# Patient Record
Sex: FEMALE | Race: WHITE | NOT HISPANIC OR LATINO | URBAN - METROPOLITAN AREA
[De-identification: names, ages, dates, MRNs, and addresses within clinical notes are randomized per-mention and may not be internally consistent; named-entity substitution may affect disease eponyms.]

---

## 2020-12-18 ENCOUNTER — INPATIENT (INPATIENT)
Facility: HOSPITAL | Age: 85
LOS: 1 days | Discharge: HOME | DRG: 177 | End: 2020-12-20
Attending: HOSPITALIST | Admitting: STUDENT IN AN ORGANIZED HEALTH CARE EDUCATION/TRAINING PROGRAM
Payer: MEDICARE

## 2020-12-18 VITALS
WEIGHT: 139.99 LBS | HEIGHT: 62 IN | SYSTOLIC BLOOD PRESSURE: 116 MMHG | RESPIRATION RATE: 18 BRPM | HEART RATE: 103 BPM | DIASTOLIC BLOOD PRESSURE: 58 MMHG | OXYGEN SATURATION: 99 % | TEMPERATURE: 100 F

## 2020-12-18 DIAGNOSIS — U07.1 COVID-19: ICD-10-CM

## 2020-12-18 DIAGNOSIS — I10 ESSENTIAL (PRIMARY) HYPERTENSION: ICD-10-CM

## 2020-12-18 DIAGNOSIS — Z29.9 ENCOUNTER FOR PROPHYLACTIC MEASURES, UNSPECIFIED: ICD-10-CM

## 2020-12-18 DIAGNOSIS — E78.5 HYPERLIPIDEMIA, UNSPECIFIED: ICD-10-CM

## 2020-12-18 DIAGNOSIS — F41.9 ANXIETY DISORDER, UNSPECIFIED: ICD-10-CM

## 2020-12-18 LAB
ALBUMIN SERPL ELPH-MCNC: 2.8 G/DL — LOW (ref 3.3–5)
ALP SERPL-CCNC: 73 U/L — SIGNIFICANT CHANGE UP (ref 40–120)
ALT FLD-CCNC: 29 U/L — SIGNIFICANT CHANGE UP (ref 12–78)
ANION GAP SERPL CALC-SCNC: 8 MMOL/L — SIGNIFICANT CHANGE UP (ref 5–17)
APPEARANCE UR: CLEAR — SIGNIFICANT CHANGE UP
AST SERPL-CCNC: 31 U/L — SIGNIFICANT CHANGE UP (ref 15–37)
BASOPHILS # BLD AUTO: 0 K/UL — SIGNIFICANT CHANGE UP (ref 0–0.2)
BASOPHILS NFR BLD AUTO: 0 % — SIGNIFICANT CHANGE UP (ref 0–2)
BILIRUB SERPL-MCNC: 0.5 MG/DL — SIGNIFICANT CHANGE UP (ref 0.2–1.2)
BILIRUB UR-MCNC: NEGATIVE — SIGNIFICANT CHANGE UP
BUN SERPL-MCNC: 16 MG/DL — SIGNIFICANT CHANGE UP (ref 7–23)
CALCIUM SERPL-MCNC: 8.7 MG/DL — SIGNIFICANT CHANGE UP (ref 8.5–10.1)
CHLORIDE SERPL-SCNC: 99 MMOL/L — SIGNIFICANT CHANGE UP (ref 96–108)
CO2 SERPL-SCNC: 24 MMOL/L — SIGNIFICANT CHANGE UP (ref 22–31)
COLOR SPEC: YELLOW — SIGNIFICANT CHANGE UP
CREAT SERPL-MCNC: 0.54 MG/DL — SIGNIFICANT CHANGE UP (ref 0.5–1.3)
D DIMER BLD IA.RAPID-MCNC: 491 NG/ML DDU — HIGH
DIFF PNL FLD: NEGATIVE — SIGNIFICANT CHANGE UP
EOSINOPHIL # BLD AUTO: 0.05 K/UL — SIGNIFICANT CHANGE UP (ref 0–0.5)
EOSINOPHIL NFR BLD AUTO: 1 % — SIGNIFICANT CHANGE UP (ref 0–6)
GLUCOSE SERPL-MCNC: 103 MG/DL — HIGH (ref 70–99)
GLUCOSE UR QL: NEGATIVE — SIGNIFICANT CHANGE UP
HCT VFR BLD CALC: 31 % — LOW (ref 34.5–45)
HGB BLD-MCNC: 10.6 G/DL — LOW (ref 11.5–15.5)
KETONES UR-MCNC: NEGATIVE — SIGNIFICANT CHANGE UP
LACTATE SERPL-SCNC: 0.6 MMOL/L — LOW (ref 0.7–2)
LEUKOCYTE ESTERASE UR-ACNC: NEGATIVE — SIGNIFICANT CHANGE UP
LIDOCAIN IGE QN: 191 U/L — SIGNIFICANT CHANGE UP (ref 73–393)
LYMPHOCYTES # BLD AUTO: 0.93 K/UL — LOW (ref 1–3.3)
LYMPHOCYTES # BLD AUTO: 20 % — SIGNIFICANT CHANGE UP (ref 13–44)
MCHC RBC-ENTMCNC: 32.4 PG — SIGNIFICANT CHANGE UP (ref 27–34)
MCHC RBC-ENTMCNC: 34.2 GM/DL — SIGNIFICANT CHANGE UP (ref 32–36)
MCV RBC AUTO: 94.8 FL — SIGNIFICANT CHANGE UP (ref 80–100)
MONOCYTES # BLD AUTO: 0.33 K/UL — SIGNIFICANT CHANGE UP (ref 0–0.9)
MONOCYTES NFR BLD AUTO: 7 % — SIGNIFICANT CHANGE UP (ref 2–14)
NEUTROPHILS # BLD AUTO: 3.35 K/UL — SIGNIFICANT CHANGE UP (ref 1.8–7.4)
NEUTROPHILS NFR BLD AUTO: 72 % — SIGNIFICANT CHANGE UP (ref 43–77)
NITRITE UR-MCNC: NEGATIVE — SIGNIFICANT CHANGE UP
NRBC # BLD: SIGNIFICANT CHANGE UP /100 WBCS (ref 0–0)
PH UR: 6 — SIGNIFICANT CHANGE UP (ref 5–8)
PLATELET # BLD AUTO: 246 K/UL — SIGNIFICANT CHANGE UP (ref 150–400)
POTASSIUM SERPL-MCNC: 3.8 MMOL/L — SIGNIFICANT CHANGE UP (ref 3.5–5.3)
POTASSIUM SERPL-SCNC: 3.8 MMOL/L — SIGNIFICANT CHANGE UP (ref 3.5–5.3)
PROCALCITONIN SERPL-MCNC: <0.05 — SIGNIFICANT CHANGE UP (ref 0–0.04)
PROT SERPL-MCNC: 6.8 G/DL — SIGNIFICANT CHANGE UP (ref 6–8.3)
PROT UR-MCNC: 15
RBC # BLD: 3.27 M/UL — LOW (ref 3.8–5.2)
RBC # FLD: 12.4 % — SIGNIFICANT CHANGE UP (ref 10.3–14.5)
SARS-COV-2 RNA SPEC QL NAA+PROBE: DETECTED
SODIUM SERPL-SCNC: 131 MMOL/L — LOW (ref 135–145)
SP GR SPEC: 1.01 — SIGNIFICANT CHANGE UP (ref 1.01–1.02)
UROBILINOGEN FLD QL: NEGATIVE — SIGNIFICANT CHANGE UP
WBC # BLD: 4.65 K/UL — SIGNIFICANT CHANGE UP (ref 3.8–10.5)
WBC # FLD AUTO: 4.65 K/UL — SIGNIFICANT CHANGE UP (ref 3.8–10.5)

## 2020-12-18 PROCEDURE — 99285 EMERGENCY DEPT VISIT HI MDM: CPT

## 2020-12-18 PROCEDURE — 71045 X-RAY EXAM CHEST 1 VIEW: CPT | Mod: 26

## 2020-12-18 PROCEDURE — 93010 ELECTROCARDIOGRAM REPORT: CPT

## 2020-12-18 RX ORDER — ONDANSETRON 8 MG/1
1 TABLET, FILM COATED ORAL
Qty: 15 | Refills: 0
Start: 2020-12-18 | End: 2020-12-22

## 2020-12-18 RX ORDER — ONDANSETRON 8 MG/1
1 TABLET, FILM COATED ORAL
Qty: 12 | Refills: 0
Start: 2020-12-18 | End: 2020-12-21

## 2020-12-18 RX ORDER — AMITRIPTYLINE HCL 25 MG
25 TABLET ORAL AT BEDTIME
Refills: 0 | Status: DISCONTINUED | OUTPATIENT
Start: 2020-12-18 | End: 2020-12-20

## 2020-12-18 RX ORDER — ACETAMINOPHEN 500 MG
650 TABLET ORAL EVERY 4 HOURS
Refills: 0 | Status: DISCONTINUED | OUTPATIENT
Start: 2020-12-18 | End: 2020-12-20

## 2020-12-18 RX ORDER — ASPIRIN/CALCIUM CARB/MAGNESIUM 324 MG
1 TABLET ORAL
Qty: 45 | Refills: 0
Start: 2020-12-18 | End: 2021-01-31

## 2020-12-18 RX ORDER — ATORVASTATIN CALCIUM 80 MG/1
40 TABLET, FILM COATED ORAL AT BEDTIME
Refills: 0 | Status: DISCONTINUED | OUTPATIENT
Start: 2020-12-18 | End: 2020-12-20

## 2020-12-18 RX ORDER — ACETAMINOPHEN 500 MG
650 TABLET ORAL ONCE
Refills: 0 | Status: COMPLETED | OUTPATIENT
Start: 2020-12-18 | End: 2020-12-18

## 2020-12-18 RX ORDER — SODIUM CHLORIDE 9 MG/ML
1000 INJECTION INTRAMUSCULAR; INTRAVENOUS; SUBCUTANEOUS ONCE
Refills: 0 | Status: COMPLETED | OUTPATIENT
Start: 2020-12-18 | End: 2020-12-18

## 2020-12-18 RX ORDER — ENOXAPARIN SODIUM 100 MG/ML
40 INJECTION SUBCUTANEOUS DAILY
Refills: 0 | Status: DISCONTINUED | OUTPATIENT
Start: 2020-12-18 | End: 2020-12-20

## 2020-12-18 RX ORDER — METOPROLOL TARTRATE 50 MG
25 TABLET ORAL DAILY
Refills: 0 | Status: DISCONTINUED | OUTPATIENT
Start: 2020-12-18 | End: 2020-12-20

## 2020-12-18 RX ORDER — LISINOPRIL 2.5 MG/1
10 TABLET ORAL DAILY
Refills: 0 | Status: DISCONTINUED | OUTPATIENT
Start: 2020-12-18 | End: 2020-12-18

## 2020-12-18 RX ADMIN — SODIUM CHLORIDE 1000 MILLILITER(S): 9 INJECTION INTRAMUSCULAR; INTRAVENOUS; SUBCUTANEOUS at 14:19

## 2020-12-18 RX ADMIN — ATORVASTATIN CALCIUM 40 MILLIGRAM(S): 80 TABLET, FILM COATED ORAL at 22:43

## 2020-12-18 RX ADMIN — Medication 25 MILLIGRAM(S): at 22:43

## 2020-12-18 RX ADMIN — Medication 650 MILLIGRAM(S): at 14:41

## 2020-12-18 RX ADMIN — Medication 650 MILLIGRAM(S): at 15:41

## 2020-12-18 NOTE — ED PROVIDER NOTE - PATIENT PORTAL LINK FT
You can access the FollowMyHealth Patient Portal offered by [facility] by registering at the following website: [email or web address]. By joining [facility]’s FollowMyHealth portal, you will also be able to view your health information using other applications (apps) compatible with our system.

## 2020-12-18 NOTE — ED PROVIDER NOTE - PHYSICAL EXAMINATION

## 2020-12-18 NOTE — H&P ADULT - PROBLEM SELECTOR PLAN 1
#Acute hypoxic respiratory failure secondary to COVID-19 Pneumonia  - admit to medicine  - day of symptom onset:  - continue with O2 support; may use NC, Venturi Mask, NRB, HFNC  - start dexamethasone in setting of hypoxemia  - will evaluate for role of remdesivir  - avoid aerosolizing procedures i.e. nebulizations when possible  - prone PRN, tolerate lower O2 saturations as possible; avoid intubation with such techniques  - provide prophylactic VTE prophylaxis given high rate of thrombotic events  - trend COVID biomarkers to evaluate for role of full dose AC  - follow up culture data; monitor for fevers, treat PRN  - will monitor off antibiotics unless there is high suspicion for superimposed bacterial infection  - maintain isolation precautions - airborne and contact  - NLR on admission:  - Code Status: Full  - Pulm Consulted  - ID Consulted - CXR: Bilateral patchy opacities  suspicious for multifocal viral/atypical pneumonia  - UA neg  - admit to medicine  - day of symptom onset: 14  - no need to O2 at this time  - dexamethasone not indicated  - out of range (over 14 days) for remdesivir indication  - avoid aerosolizing procedures i.e. nebulizations when possible  - prone PRN, tolerate lower O2 saturations as possible; avoid intubation with such techniques  - provide prophylactic VTE prophylaxis given high rate of thrombotic events  - trend COVID biomarkers to evaluate for role of full dose AC  - follow up Bcx, Ucx, ddimer, ferritin, LDH  - will monitor off antibiotics unless there is high suspicion for superimposed bacterial infection  - maintain isolation precautions - airborne and contact  - NLR on admission: 3.6  - Code Status: Full  - ID (Dr. Mcgill) Consulted

## 2020-12-18 NOTE — PATIENT PROFILE ADULT - TRANSPORTATION

## 2020-12-18 NOTE — ED PROVIDER NOTE - PROGRESS NOTE DETAILS
Pt doing well. vitals WNL. discussed findings with patient daughter and pt. Advised pending covid and likely covid positive. Advised of ED return precautions. COVID positive. pt not providing UA. Denies  complaints. Advised daughter positive covid. Pt ambulated without hypoxia, maintained 99% on room air. Discussed with Dr. crystal recommend aspirin and decadron. pt reports allergy to steroid in which lips swelling up. Rxed aspirin and zofran. ER return precautions discussed. will provide pulse ox. Discussed with Dr. crystal, pt daughter advising pt unable to care for herself at home. Advised to admit.

## 2020-12-18 NOTE — H&P ADULT - NSICDXPASTMEDICALHX_GEN_ALL_CORE_FT
PAST MEDICAL HISTORY:  HLD (hyperlipidemia)     HTN (hypertension)     Macular degeneration     Osteoarthritis      PAST MEDICAL HISTORY:  Anxiety     HLD (hyperlipidemia)     HTN (hypertension)     Macular degeneration     Osteoarthritis

## 2020-12-18 NOTE — H&P ADULT - NSHPSOCIALHISTORY_GEN_ALL_CORE
Tobacco: denies  EtOH: denies  Recreational drug use: denies  Lives with:  Ambulates: independent  ADLs: independent  Occupation:  Vaccinations: Tobacco: denies  EtOH: denies  Recreational drug use: denies  Lives with: Alone  Ambulates: independent  ADLs: independent  Occupation: Retired cafeteria  Vaccinations: Flu

## 2020-12-18 NOTE — H&P ADULT - PROBLEM SELECTOR PLAN 5
DVT PPX with Lovenox 40  IMPROVE VTE Individual Risk Assessment          RISK                                                          Points  [  ] Previous VTE                                                3  [  ] Thrombophilia                                             2  [  ] Lower limb paralysis                                   2        (unable to hold up >15 seconds)    [  ] Current Cancer                                             2         (within 6 months)  [  ] Immobilization > 24 hrs                              1  [  ] ICU/CCU stay > 24 hours                             1  [ x ] Age > 60                                                         1    IMPROVE VTE Score:  1

## 2020-12-18 NOTE — ED PROVIDER NOTE - CLINICAL SUMMARY MEDICAL DECISION MAKING FREE TEXT BOX
BIBA due to nausea and fever x 14 days. Pt reports she has not vomited as she has not eaten anything. pt reports she does not have abdominal pain but "a funky feeling not making me want to eat". Pt reports having fever twice within the last two weeks TMax 100F. (+) cough. Plan includes COVID, labs r/o dehydration IVF, UA r/o UTI, re-assess

## 2020-12-18 NOTE — ED ADULT NURSE NOTE - OBJECTIVE STATEMENT
Received pt in bed alert and oriented.  C/O abdominal pain x 14 days middle region.  Pt also having nausea and fevers.  Pt has been not feeling well for the 14 days and hasn't been eating as much.  Ongoing nursing care and safety maintained.

## 2020-12-18 NOTE — ED PROVIDER NOTE - NSFOLLOWUPINSTRUCTIONS_ED_ALL_ED_FT
You have been tested today for COVID 19 (Coronavirus) as you had a known exposure.  Currently you do not meet criteria for admission to the hospital.  You are being sent home at this time, but you need to put yourself on HOME ISOLATION.  It is currently recommended at this time that you isolate for the next 14 days unless further instructions are provided at a later date.  When home on home isolation please try to use your own bathroom and bedroom, in an effort to prevent the spread to anyone in your household.  Continue Tylenol per label instructions as needed for fever and body aches  Advance activity as tolerated  Please return to the ED for increased difficulty breathing or signs of respiratory distress, unable to eat / drink, dizziness , passing out, chest pains, or any other concerning symptoms.    Your will be contacted with your results at a later time, but currently testing turn around time is around one week.         WHAT YOU NEED TO KNOW:    COVID-19 is the disease caused by the novel (new) coronavirus first discovered in 2019. Coronaviruses generally cause upper respiratory (nose, throat, and lung) infections, such as a cold. The new virus can also cause serious lower respiratory conditions, such as pneumonia or acute respiratory distress syndrome (ARDS). The new virus can also affect many other organs, including the brain and heart. Blood vessels can also be affected, leading to blood clots. Anyone can develop serious problems from the new virus, but your risk is higher if you are 65 or older. A weak immune system, diabetes, or a heart or lung condition can also increase your risk.    DISCHARGE INSTRUCTIONS:    If you think you or someone you know may be infected: Do the following to protect others:   •If emergency care is needed, tell the  about the possible infection, or call ahead and tell the emergency department.      •Call a healthcare provider for instructions if symptoms are mild. Anyone who may be infected should not arrive without calling first. The provider will need to protect staff members and other patients.      •The person who may be infected needs to wear a face covering while getting medical care. This will help lower the risk of infecting others. Coverings are not used for anyone who is younger than 2 years, has breathing problems, or cannot remove it. The provider can give you instructions for anyone who cannot wear a covering.      Call your local emergency number (911 in the [place]) or go to the emergency department if:   •You have trouble breathing or shortness of breath at rest.      •You have chest pain or pressure that lasts longer than 5 minutes.      •You become confused or hard to wake.      •Your lips or face are blue.      •You have a fever of 104°F (40°C) or higher.      Call your doctor if:   •You do not have symptoms of COVID-19 but had close physical contact within 14 days with someone who tested positive.      •You have questions or concerns about your condition or care.      Medicines: You may need any of the following:   •Decongestants help reduce nasal congestion and help you breathe more easily. If you take decongestant pills, they may make you feel restless or cause problems with your sleep. Do not use decongestant sprays for more than a few days.      •Cough suppressants help reduce coughing. Ask your healthcare provider which type of cough medicine is best for you.      •Acetaminophen decreases pain and fever. It is available without a doctor's order. Ask how much to take and how often to take it. Follow directions. Read the labels of all other medicines you are using to see if they also contain acetaminophen, or ask your doctor or pharmacist. Acetaminophen can cause liver damage if not taken correctly. Do not use more than 4 grams (4,000 milligrams) total of acetaminophen in one day.       •NSAIDs, such as ibuprofen, help decrease swelling, pain, and fever. NSAIDs can cause stomach bleeding or kidney problems in certain people. If you take blood thinner medicine, always ask your healthcare provider if NSAIDs are safe for you. Always read the medicine label and follow directions.      •Take your medicine as directed. Contact your healthcare provider if you think your medicine is not helping or if you have side effects. Tell him or her if you are allergic to any medicine. Keep a list of the medicines, vitamins, and herbs you take. Include the amounts, and when and why you take them. Bring the list or the pill bottles to follow-up visits. Carry your medicine list with you in case of an emergency.      How the 2019 coronavirus spreads: The virus spreads quickly and easily. You can become infected if you are in contact with a large amount of the virus, even for a short time. You can also become infected by being around a small amount of virus for a long time. The following are ways the virus is thought to spread, but more information may be coming:   •Droplets are the most common way all coronaviruses spread. The virus can travel in droplets that form when a person talks, coughs, or sneezes. Anyone who breathes in the droplets or gets them in his or her eyes can become infected with the virus. Droplets can stay in the air for a few hours and travel farther than 6 feet (2 meters). A person can have contact with the droplets, even after the infected person leaves the room. This is called airborne transmission, a less common way the virus can spread. It has mainly happened in closed rooms that do not have flowing air.      •Close personal contact with an infected person increases the risk for infection. Close personal contact means you are within 6 feet (2 meters) of the person. The virus can be passed starting 2 days before symptoms begin. The virus can be passed even if the person never develops symptoms, starting 2 days before a positive test. Infection can happen from close contact for a total of 15 minutes or more over 24 hours. An example is close contact 3 times for 5 minutes each within 24 hours. Some factors make infection more likely. These include how close you are and for how long. If you are indoors or outdoors also matters. The risk is even higher if both of you are not wearing face coverings.      •Person-to-person contact can spread the virus. For example, a person with the virus on his or her hands can spread it by shaking hands with someone. At this time, it does not appear that the virus can be passed to a baby during pregnancy or delivery. Some newborns have tested positive for the virus. The newborns may have been infected before, during, or after birth. The greatest risk is for a  to be in close contact with an infected person. The virus also does not appear to spread in breast milk. If you are pregnant or breastfeeding, talk to your healthcare provider or obstetrician about any concerns you have.      •The virus can stay on objects and surfaces. A person can get the virus on his or her hands by touching the object or surface. Infection happens if the person then touches his or her eyes or mouth with unwashed hands. It is not yet known how long the virus can stay on an object or surface. That is why it is important to clean all surfaces that are used regularly.      •An infected animal may be able to infect a person who touches it. This may happen at live markets or on a farm.      How everyone can lower the risk for COVID-19: The best way to prevent infection is to avoid anyone who is infected, but this can be hard to do. An infected person can spread the virus before signs or symptoms begin, or even if signs or symptoms never develop. The following can help lower the risk for infection:     Limit the Spread of Infectious Disease     •Wash your hands often throughout the day. Use soap and water. Rub your soapy hands together, lacing your fingers. Wash the front and back of each hand, and in between your fingers. Use the fingers of one hand to scrub under the fingernails of the other hand. Wash for at least 20 seconds. Rinse with warm, running water for several seconds. Then dry your hands with a clean towel or paper towel. Use hand  that contains alcohol if soap and water are not available. Do not touch your eyes, nose, or mouth without washing your hands first. Teach children how to wash their hands and use hand .  Handwashing           •Cover a sneeze or cough. This prevents droplets from traveling from you to others. Turn your face away and cover your mouth and nose with a tissue. Throw the tissue away. Use the bend of your arm if a tissue is not available. Then wash your hands well with soap and water or use hand . Turn and cover your face if you are around someone who is sneezing or coughing. Teach children how to cover a cough or sneeze.      •Follow worldwide, national, and local social distancing guidelines. Social distancing means people avoid close physical contact so the virus cannot spread from one person to another. Keep at least 6 feet (2 meters) between you and others. Also keep this distance from anyone who comes to your home, such as someone making a delivery.      •Make a habit of not touching your face. It is not known how long the virus can stay on objects and surfaces. If you get the virus on your hands, you can transfer it to your eyes, nose, or mouth and become infected. You can also transfer it to objects, surfaces, or people. Be aware of what you touch when you go out. Examples include handrails and elevator buttons. Try not to touch anything with bare hands unless it is necessary. Wash your hands before you leave your home and when you return.      •Clean and disinfect high-touch surfaces and objects often. Use a disinfecting solution or wipes. You can make a solution by diluting 4 teaspoons of bleach in 1 quart (4 cups) of water. Clean and disinfect even if you think no one living in or coming to your home is infected with the virus. You can wipe items with a disinfecting cloth before you bring them into your home. Wash your hands after you handle anything you bring into your home.      •Make your immune system as healthy as possible. A weakened immune system makes you more vulnerable to the new coronavirus. No COVID-19 vaccine is currently available. Vaccines such as the flu and pneumonia vaccines can help your immune system fight infection. Your doctor can tell you which vaccines to get, and when to get them. Keep your immune system as strong as possible. Do not smoke. Eat healthy foods, exercise regularly, and try to manage stress. Go to bed and wake up at the same times each day.   Healthy Foods           Social distancing guidelines: National and local social distancing rules vary. Rules may change over time as restrictions are lifted. Restrictions may return if an outbreak happens where you live. It is important to know and follow all current social distancing rules in your area. The following are general guidelines:  •Limit trips out of your home. You may be able to have food, medicines, and other supplies delivered. If possible, have delivered items left at your door or other area. Try not to have someone hand you an item. You will be so close to the person that the virus can spread between you.      •Do not have close physical contact with anyone who does not live in your home. Do not shake hands with, hug, or kiss a person as a greeting. Stand or walk as far from others as possible. If you must use public transportation (such as a bus or subway), try to sit or stand away from others. You can stay safely connected with others through phone calls, e-mail messages, social media websites, and video chats. Check in on anyone who may be having a hard time socially distancing, or who lives alone. Ask administrators at nursing homes or long-term care facilities how you can safely communicate with someone living there.      •Wear a cloth face covering around others who do not live in your home. Face coverings help prevent the virus from spreading to others in droplets. You can use a clear face covering if someone needs to read your lips. This is a cloth covering that has plastic over the mouth area so your lips can be seen. Do not use coverings that have breathing valves or vents. The virus can travel out of the valve or vent and be spread to others. Do not take your covering off to talk, cough, or sneeze. Do not use coverings on children younger than 2 years or on anyone who has breathing problems or cannot remove it.      •Only allow medical or other necessary professionals into your home. Wear your face covering, and remind professionals to wear a face covering. Remind them to wash their hands when they arrive and before they leave. Do not let anyone who does not live in your home in, even if the person is not sick. A person can pass the virus to others before symptoms of COVID-19 begin. Some people never even develop symptoms. Children commonly have mild symptoms or no symptoms. It may be hard to tell a child not to hug or kiss you. Explain that this is how he or she can help you stay healthy.      •Do not go to someone else's home unless it is necessary. Do not go over to visit, even if the person is lonely. Only go if you need to help him or her. Make sure you both wear face coverings while you are there.      •Avoid large gatherings and crowds. Gatherings or crowds of 10 or more individuals can cause the virus to spread. Examples of gatherings include parties, sporting events, [employer] services, and conferences. Crowds may form at beaches, spears, and tourist attractions. Protect yourself by staying away from large gatherings and crowds.      •Ask your healthcare provider for other ways to have appointments. You may be able to have appointments without having to go into the provider's office. Some providers offer phone, video, or other types of appointments. You may also be able to get prescriptions for a few months of your medicines at a time.      •Stay safe if you must go out to work. You may have a job that can only be done outside your home. Keep physical distance between you and other workers as much as possible. Follow your employer's rules so everyone stays safe.      If you have COVID-19 and are recovering at home: Healthcare providers will give you specific instructions to follow. The following are general guidelines to remind you how to keep others safe until you are well:   •Wash your hands often. Use soap and water as much as possible. You can use hand  that contains alcohol if soap and water are not available. Do not share towels with anyone. If you use paper towels, throw them away in a lined trash can kept in your room or area. Use a covered trash can, if possible.      •Do not go out of your home unless it is necessary. You may have to go to your healthcare provider's office for check-ups or to get prescription refills. Do not arrive at the provider's office without an appointment. Providers have to make their offices safe for staff and other patients.      •Do not have close physical contact with anyone unless it is necessary. Only have close physical contact with a person giving direct care, or a baby or child you must care for. Family members and friends should not visit you. If possible, stay in a separate area or room of your home if you live with others. No one should go into the area or room except to give you care. You can visit with others by phone, video chat, e-mail, or similar systems. It is important to stay connected with others in your life while you recover.      •Wear a face covering while others are near you. This can help prevent droplets from spreading the virus when you talk, sneeze, or cough. Put the covering on before anyone comes into your room or area. Remind the person to cover his or her nose and mouth before going in to provide care for you.      •Do not share items. Do not share dishes, towels, or other items with anyone. Items need to be washed after you use them.      •Protect your baby. Wash your hands with soap and water often throughout the day. Wear a clean face covering while you breastfeed, or while you express or pump breast milk. If possible, ask someone who is well to care for your baby. You can put breast milk in bottles for the person to use, if needed. Talk to your healthcare provider if you have any questions or concerns about caring for or bonding with your baby. He or she will tell you when to bring your baby in for check-ups and vaccines. He or she will also tell you what to do if you think your baby was infected with the new virus.      •Do not handle live animals. Until more is known, it is best not to touch, play with, or handle live animals. Some animals, including pets, have been infected with the new coronavirus. Do not handle or care for animals until you are well. Care includes feeding, petting, and cuddling your pet. Do not let your pet lick you or share your food. Ask someone who is not infected to take care of your pet, if possible. If you must care for a pet, wear a face covering. Wash your hands before and after you give care.      •Follow directions from your healthcare provider for being around others after you recover. It is not known for sure if or for how long a recovered person can pass the virus to others. Your provider may give you instructions, such as continuing social distancing or wearing a face covering around others. The following are general guidelines for when you can be around others:?If you never developed any symptoms, wait at least 10 days after your positive test. Your provider may want you to have 2 negative tests in a row at least 24 hours apart. This depends on how available testing is in your area.      ?If you did have symptoms, wait at least 10 days after the symptoms first appeared. Then you will need to have no fever for 24 hours without fever medicine. Most of your symptoms will also need to be gone. A loss of taste or smell may continue for several months. It is considered okay to be around others if this is your only symptom.      ?If you were hospitalized for COVID-19 and needed oxygen, your provider will tell you how long to wait. You may need to wait until 20 days after symptoms appeared. It may be less if you have 2 negative tests in a row at least 24 hours apart. This will depend on how available testing is in your area.        How to take care of someone who has COVID-19: If the person lives in another home, arrange for a time to give care. Remember to bring a few pairs of disposable gloves and a cloth face covering. The following are general guidelines to help you safely care for anyone who has COVID-19:  •Wash your hands often. Wash before and after you go into the person's home, area, or room. Throw paper towels away in a lined trash can that has a lid, if possible.      •Do not allow others to go near the person. No one should come into the person's home unless it is necessary. If possible, the person should be in a separate area or room if he or she lives with others. Keep the room's door shut unless you need to go in or out. Have others call, video chat, or e-mail the person if he or she is feeling well enough. The person may feel lonely if he or she is kept separate for a long period of time. Safe communication can help him or her stay connected to family and friends.      •Make sure the person's room has good air flow. You may be able to open the window if the weather allows. An air conditioner can also be turned on to help air move.      •Contact the person before you go in to give care. Make sure the person is wearing a face covering. Remind him or her to wash his or her hands with soap and water. He or she can use hand  that contains alcohol if soap and water are not available. Put on a face covering before you go in to give care.      •Wear gloves while you give care and clean. Clean items the person uses often. Clean countertops, cooking surfaces, and the fronts and insides of the microwave and refrigerator. Clean the shower, toilet, the area around the toilet, the sink, the area around the sink, and faucets. Gather used laundry or bedding. Wash and dry items on the warmest settings the fabric allows. Wash dishes and silverware in hot, soapy water or in a .      •Anything you throw away needs to go into a lined trash can. When you need to empty the trash, close the open end of the lining and tie it closed. This helps prevent items the virus is on from spilling out of the trash. Remove your gloves and throw them away. Wash your hands.      Follow up with your doctor as directed: Write down your questions so you remember to ask them during your visits.    For more information:   •[employer] Prevention  [address] [place] [address]  Phone: 1-346.111.9563  Web Address: http://www.cdc.gov

## 2020-12-18 NOTE — H&P ADULT - NSHPPHYSICALEXAM_GEN_ALL_CORE
GENERAL: patient appears well, no acute distress, appropriate, pleasant  EYES: sclera clear, no exudates  ENMT: oropharynx clear without erythema, no exudates, +dry membranes  NECK: supple, soft, no thyromegaly noted  LUNGS: good air entry bilaterally, clear to auscultation, symmetric breath sounds, no wheezing or rhonchi appreciated  HEART: soft S1/S2, regular rate and rhythm, no murmurs noted, no lower extremity edema  GASTROINTESTINAL: abdomen is soft, nontender, nondistended, normoactive bowel sounds, no palpable masses  INTEGUMENT: skin tenting, no lesions noted  MUSCULOSKELETAL: no clubbing or cyanosis, no obvious deformity  NEUROLOGIC: awake, alert, oriented x3, good muscle tone in 4 extremities, no obvious sensory deficits  PSYCHIATRIC: mood is good, affect is congruent, linear and logical thought process

## 2020-12-18 NOTE — H&P ADULT - ATTENDING COMMENTS
87 year-old female with PMHx of HTN and HLD presenting to emergency department with complaint cough and fever for 14days    T(C): 37.1 (12-18-20 @ 20:15), Max: 37.9 (12-18-20 @ 12:50)  HR: 69 (12-18-20 @ 20:15) (69 - 103)  BP: 120/65 (12-18-20 @ 20:15) (116/58 - 120/65)  RR: 16 (12-18-20 @ 20:15) (16 - 18)  SpO2: 95% (12-18-20 @ 20:15) (95% - 99%)      Covid positive. Follow up inflammatory markers- D dimers, LDH, Ferritin.  Patient does not qualify fro Remdesivir or steroids given 14 dyas psot infection and not de sating.   Will get ID, Pulm consult.   Inhalers, Oxygen if needed. PT eval.

## 2020-12-18 NOTE — H&P ADULT - PROBLEM SELECTOR PLAN 4
DVT PPX with Lovenox 40  IMPROVE VTE Individual Risk Assessment          RISK                                                          Points  [  ] Previous VTE                                                3  [  ] Thrombophilia                                             2  [  ] Lower limb paralysis                                   2        (unable to hold up >15 seconds)    [  ] Current Cancer                                             2         (within 6 months)  [  ] Immobilization > 24 hrs                              1  [  ] ICU/CCU stay > 24 hours                             1  [ x ] Age > 60                                                         1    IMPROVE VTE Score:  1 - Continue home amitriptyline

## 2020-12-18 NOTE — H&P ADULT - ASSESSMENT
Patient is a 87 year-old female with PMHx of HTN and HLD presenting to emergency department with complaint fever and weakness for 14days. Symptoms started on 12/6 with cough. Found to be COVID+ in ED.

## 2020-12-18 NOTE — ED PROVIDER NOTE - OBJECTIVE STATEMENT
86 y/o female with PMHx HLD and HTN BIBA due to nausea and fever x 14 days. Pt reports she has not vomited as she has not eaten anything. pt reports she does not have abdominal pain but "a funky feeling not making me want to eat". Pt reports having fever twice within the last two weeks TMax 100F. pt reports her tongue is dry. pt admits to intermittent cough. pt denies COVID exposure, dysuria, hematuria, vomiting, abd pain, chest pain, SOB, or any other complaints.   PCP - Lorelei

## 2020-12-18 NOTE — ED PROVIDER NOTE - ATTENDING CONTRIBUTION TO CARE
86 yo f who has not been feeling well for past 2 weeks. she has intermittent nausea keeping her from eating or drinking much and today felt very light headed when she tried to ambulate. she has a fever as well  she denies any covid exposures but her nephew is [facility] in this hospital with covid for past few weeks.  pmd dr barrientos  on eval  wd wn female awake alert not distress no pallor  heent mm dry oth normal  cor rrr no devora  chest cta  abd soft nd nt no hsm no rebound guard or cvat  ext normal  neuro normal  skin normal no pallo rno rash  plan: ro covid labs hydrate ro acs or other causes of sx

## 2020-12-18 NOTE — H&P ADULT - HISTORY OF PRESENT ILLNESS
Patient is a 87 year-old female with PMHx of HTN and HLD presenting to emergency department with complaint of nausea and fever for 14days.     reports she has not vomited as she has not eaten anything. pt reports she does not have abdominal pain but "a funky feeling not making me want to eat". Pt reports having fever twice within the last two weeks TMax 100F. pt reports her tongue is dry. pt admits to intermittent cough. pt denies COVID exposure, dysuria, hematuria, vomiting, abd pain, chest pain, SOB, or any other complaints.     The date the pt first felt unwell:   Fever or chills: yes [  ]   no [  ]   - Tmax:   Fatigue, malaise or generalized weakness: yes [  ]   no [  ]  Shortness of breath/dyspnea: yes [  ]   no [  ]  Cough: yes [  ]   no [  ], sputum production: yes [  ]   no [  ]  Blood in sputum: yes [  ]   no [  ]  Anorexia/po intolerance: yes [  ]   no [  ]  Chest pain or chest tightness: yes [  ]   no [  ]  Edema in legs: yes [  ]   no [  ]  Myalgias: yes [  ]   no [  ]  Headache: yes [  ]   no [  ]  Sore throat: yes [  ]   no [  ]  Rhinorrhea: yes [  ]   no [  ]  Abd pain: yes [  ]   no [  ]  Nausea: yes [  ]   no [  ]  Vomiting: yes [  ]   no [  ]  Diarrhea: yes [  ]   no [  ]  Skin rashes: yes [  ]   no [  ]  Loss of sense of smell/anosmia: yes [  ]   no [  ]  Conjunctivitis: yes [  ]   no [  ]  Recent travel: yes [  ]   no [  ] - Location:   Any sick contacts: yes [  ]   no [  ]  Close contact with someone confirmed positive with COVID-19 / SARS-CoV2 in the last 14 days: yes [  ]   no [  ]  Code status:    Other pertinent history:    ED course:  VS: T 100.3, , /58, RR 18, SpO2 99% on RA  Significant Labs: WBC , H/H, BUN/Cr   EKG:  CXR:  Given   Patient is a 87 year-old female with PMHx of HTN and HLD presenting to emergency department with complaint cough and fever for 14days.   Symptoms started on 12/6. Patient called PCP and encouraged to self quarantine for 14days. During this time, her cough improved but she notes decreased appetite, and PO water intake, temperature no higher than 100 at home, feeling weak, unable to sleep, and nausea. Patient denies chest pain, palpitations, SOB, abdominal pain, vomiting, numbness, tingling, or chills.    The date the pt first felt unwell: 12/6  Fever or chills: yes [ x ]   no [  ]   - 100 Tmax:   Fatigue, malaise or generalized weakness: yes [x  ]   no [  ]  Shortness of breath/dyspnea: yes [  ]   no [ x ]  Cough: yes [ x ]   no [  ], sputum production: yes [  ]   no [ x ]  Blood in sputum: yes [  ]   no [ x ]  Anorexia/po intolerance: yes [ x ]   no [  ]  Chest pain or chest tightness: yes [  ]   no [ x ]  Edema in legs: yes [  ]   no [ x ]  Myalgias: yes [  ]   no [ x ]  Headache: yes [  ]   no [ x ]  Sore throat: yes [  ]   no [ x ]  Rhinorrhea: yes [  ]   no [ x ]  Abd pain: yes [  ]   no [ x ]  Nausea: yes [ x ]   no [  ]  Vomiting: yes [  ]   no [ x ]  Diarrhea: yes [  ]   no [ x ]  Skin rashes: yes [  ]   no [ x ]  Loss of sense of smell/anosmia: yes [ x ]   no [  ]  Conjunctivitis: yes [  ]   no [ x ]  Recent travel: yes [  ]   no [ x ] - Location:   Any sick contacts: yes [  ]   no [ x ]  Close contact with someone confirmed positive with COVID-19 / SARS-CoV2 in the last 14 days: yes [  ]   no [ x ]  Code status: Full    ED course:  VS: T 100.3, , /58, RR 18, SpO2 99% on RA  Significant Labs: WBC , H/H, BUN/Cr   EKG: NSR, , QTc 406  CXR: Bilateral patchy opacities  suspicious for multifocal viral/atypical pneumonia  Given: Tylenol x1, 1L NS bolus   Patient is a 87 year-old female with PMHx of HTN and HLD presenting to emergency department with complaint cough and fever for 14days. Symptoms started on 12/6. Patient called PCP and encouraged to self quarantine for 14days. During this time, her cough improved but she notes decreased appetite, and PO water intake, temperature no higher than 100 at home, feeling weak, unable to sleep, and nausea. Patient denies chest pain, palpitations, SOB, abdominal pain, vomiting, numbness, tingling, or chills.    The date the pt first felt unwell: 12/6  Fever or chills: yes [ x ]   no [  ]   - 100 Tmax:   Fatigue, malaise or generalized weakness: yes [x  ]   no [  ]  Shortness of breath/dyspnea: yes [  ]   no [ x ]  Cough: yes [ x ]   no [  ], sputum production: yes [  ]   no [ x ]  Blood in sputum: yes [  ]   no [ x ]  Anorexia/po intolerance: yes [ x ]   no [  ]  Chest pain or chest tightness: yes [  ]   no [ x ]  Edema in legs: yes [  ]   no [ x ]  Myalgias: yes [  ]   no [ x ]  Headache: yes [  ]   no [ x ]  Sore throat: yes [  ]   no [ x ]  Rhinorrhea: yes [  ]   no [ x ]  Abd pain: yes [  ]   no [ x ]  Nausea: yes [ x ]   no [  ]  Vomiting: yes [  ]   no [ x ]  Diarrhea: yes [  ]   no [ x ]  Skin rashes: yes [  ]   no [ x ]  Loss of sense of smell/anosmia: yes [ x ]   no [  ]  Conjunctivitis: yes [  ]   no [ x ]  Recent travel: yes [  ]   no [ x ] - Location:   Any sick contacts: yes [  ]   no [ x ]  Close contact with someone confirmed positive with COVID-19 / SARS-CoV2 in the last 14 days: yes [  ]   no [ x ]  Code status: Full    ED course:  VS: T 100.3, , /58, RR 18, SpO2 99% on RA  Significant Labs: WBC 4.65, H/H 10.6/31, BUN/Cr 16/.56, Lactate .6, trop neg, procal neg  UA: neg  COVID POSITIVE  EKG: NSR, , QTc 406  CXR: Bilateral patchy opacities  suspicious for multifocal viral/atypical pneumonia  Given: Tylenol x1, 1L NS bolus

## 2020-12-18 NOTE — ED PROVIDER NOTE - CARE PROVIDER_API CALL
Deon Marin; PhD)  Infectious Disease; Internal Medicine  [address] [place] [address]  Phone: [phone]  Fax: [phone]  Follow Up Time: 1-3 Days

## 2020-12-19 ENCOUNTER — TRANSCRIPTION ENCOUNTER (OUTPATIENT)
Age: 85
End: 2020-12-19

## 2020-12-19 VITALS
DIASTOLIC BLOOD PRESSURE: 70 MMHG | OXYGEN SATURATION: 94 % | RESPIRATION RATE: 18 BRPM | HEART RATE: 82 BPM | TEMPERATURE: 98 F | SYSTOLIC BLOOD PRESSURE: 116 MMHG

## 2020-12-19 LAB
ALBUMIN SERPL ELPH-MCNC: 2.8 G/DL — LOW (ref 3.3–5)
ALP SERPL-CCNC: 79 U/L — SIGNIFICANT CHANGE UP (ref 40–120)
ALT FLD-CCNC: 38 U/L — SIGNIFICANT CHANGE UP (ref 12–78)
ANION GAP SERPL CALC-SCNC: 9 MMOL/L — SIGNIFICANT CHANGE UP (ref 5–17)
AST SERPL-CCNC: 43 U/L — HIGH (ref 15–37)
BASOPHILS # BLD AUTO: 0.01 K/UL — SIGNIFICANT CHANGE UP (ref 0–0.2)
BASOPHILS NFR BLD AUTO: 0.3 % — SIGNIFICANT CHANGE UP (ref 0–2)
BILIRUB SERPL-MCNC: 0.6 MG/DL — SIGNIFICANT CHANGE UP (ref 0.2–1.2)
BUN SERPL-MCNC: 8 MG/DL — SIGNIFICANT CHANGE UP (ref 7–23)
CALCIUM SERPL-MCNC: 8.6 MG/DL — SIGNIFICANT CHANGE UP (ref 8.5–10.1)
CHLORIDE SERPL-SCNC: 99 MMOL/L — SIGNIFICANT CHANGE UP (ref 96–108)
CO2 SERPL-SCNC: 23 MMOL/L — SIGNIFICANT CHANGE UP (ref 22–31)
CREAT SERPL-MCNC: 0.5 MG/DL — SIGNIFICANT CHANGE UP (ref 0.5–1.3)
CULTURE RESULTS: SIGNIFICANT CHANGE UP
EOSINOPHIL # BLD AUTO: 0.03 K/UL — SIGNIFICANT CHANGE UP (ref 0–0.5)
EOSINOPHIL NFR BLD AUTO: 0.8 % — SIGNIFICANT CHANGE UP (ref 0–6)
FERRITIN SERPL-MCNC: 456 NG/ML — HIGH (ref 15–150)
GLUCOSE SERPL-MCNC: 85 MG/DL — SIGNIFICANT CHANGE UP (ref 70–99)
HCT VFR BLD CALC: 32.2 % — LOW (ref 34.5–45)
HGB BLD-MCNC: 10.6 G/DL — LOW (ref 11.5–15.5)
IMM GRANULOCYTES NFR BLD AUTO: 0.3 % — SIGNIFICANT CHANGE UP (ref 0–1.5)
LDH SERPL L TO P-CCNC: 331 U/L — HIGH (ref 50–242)
LYMPHOCYTES # BLD AUTO: 0.92 K/UL — LOW (ref 1–3.3)
LYMPHOCYTES # BLD AUTO: 23 % — SIGNIFICANT CHANGE UP (ref 13–44)
MCHC RBC-ENTMCNC: 31.4 PG — SIGNIFICANT CHANGE UP (ref 27–34)
MCHC RBC-ENTMCNC: 32.9 GM/DL — SIGNIFICANT CHANGE UP (ref 32–36)
MCV RBC AUTO: 95.3 FL — SIGNIFICANT CHANGE UP (ref 80–100)
MONOCYTES # BLD AUTO: 0.71 K/UL — SIGNIFICANT CHANGE UP (ref 0–0.9)
MONOCYTES NFR BLD AUTO: 17.8 % — HIGH (ref 2–14)
NEUTROPHILS # BLD AUTO: 2.32 K/UL — SIGNIFICANT CHANGE UP (ref 1.8–7.4)
NEUTROPHILS NFR BLD AUTO: 57.8 % — SIGNIFICANT CHANGE UP (ref 43–77)
NRBC # BLD: 0 /100 WBCS — SIGNIFICANT CHANGE UP (ref 0–0)
PLATELET # BLD AUTO: 258 K/UL — SIGNIFICANT CHANGE UP (ref 150–400)
POTASSIUM SERPL-MCNC: 3.3 MMOL/L — LOW (ref 3.5–5.3)
POTASSIUM SERPL-SCNC: 3.3 MMOL/L — LOW (ref 3.5–5.3)
PROT SERPL-MCNC: 6.9 G/DL — SIGNIFICANT CHANGE UP (ref 6–8.3)
RBC # BLD: 3.38 M/UL — LOW (ref 3.8–5.2)
RBC # FLD: 12.3 % — SIGNIFICANT CHANGE UP (ref 10.3–14.5)
SARS-COV-2 IGG SERPL QL IA: POSITIVE
SARS-COV-2 IGM SERPL IA-ACNC: 3.25 INDEX — HIGH
SODIUM SERPL-SCNC: 131 MMOL/L — LOW (ref 135–145)
SPECIMEN SOURCE: SIGNIFICANT CHANGE UP
WBC # BLD: 4 K/UL — SIGNIFICANT CHANGE UP (ref 3.8–10.5)
WBC # FLD AUTO: 4 K/UL — SIGNIFICANT CHANGE UP (ref 3.8–10.5)

## 2020-12-19 PROCEDURE — 99232 SBSQ HOSP IP/OBS MODERATE 35: CPT

## 2020-12-19 PROCEDURE — 99223 1ST HOSP IP/OBS HIGH 75: CPT

## 2020-12-19 RX ORDER — POTASSIUM CHLORIDE 20 MEQ
40 PACKET (EA) ORAL EVERY 4 HOURS
Refills: 0 | Status: COMPLETED | OUTPATIENT
Start: 2020-12-19 | End: 2020-12-19

## 2020-12-19 RX ADMIN — ENOXAPARIN SODIUM 40 MILLIGRAM(S): 100 INJECTION SUBCUTANEOUS at 12:02

## 2020-12-19 RX ADMIN — ATORVASTATIN CALCIUM 40 MILLIGRAM(S): 80 TABLET, FILM COATED ORAL at 21:04

## 2020-12-19 RX ADMIN — Medication 40 MILLIEQUIVALENT(S): at 17:50

## 2020-12-19 RX ADMIN — Medication 25 MILLIGRAM(S): at 21:04

## 2020-12-19 RX ADMIN — Medication 25 MILLIGRAM(S): at 05:59

## 2020-12-19 RX ADMIN — Medication 1 TABLET(S): at 12:01

## 2020-12-19 RX ADMIN — Medication 40 MILLIEQUIVALENT(S): at 13:51

## 2020-12-19 NOTE — DISCHARGE NOTE PROVIDER - NSDCMRMEDTOKEN_GEN_ALL_CORE_FT
Elavil 25 mg oral tablet: 1 tab(s) orally once a day (at bedtime)  lisinopril 10 mg oral tablet: 1 tab(s) orally once a day  metoprolol succinate 25 mg oral capsule, extended release: 1 cap(s) orally once a day  Multiple Vitamins oral tablet: 1 tab(s) orally once a day  Zocor 80 mg oral tablet: 1 tab(s) orally once a day (at bedtime)   acetaminophen 325 mg oral tablet: 2 tab(s) orally every 4 hours, As needed for fever or pain  Elavil 25 mg oral tablet: 1 tab(s) orally once a day (at bedtime)  lisinopril 10 mg oral tablet: 1 tab(s) orally once a day  metoprolol succinate 25 mg oral capsule, extended release: 1 cap(s) orally once a day  Multiple Vitamins oral tablet: 1 tab(s) orally once a day  Zocor 80 mg oral tablet: 1 tab(s) orally once a day (at bedtime)   acetaminophen 325 mg oral tablet: 2 tab(s) orally every 4 hours, As needed for fever or pain  aspirin 81 mg oral tablet: 1 tab(s) orally once a day  Elavil 25 mg oral tablet: 1 tab(s) orally once a day (at bedtime)  lisinopril 10 mg oral tablet: 1 tab(s) orally once a day  metoprolol succinate 25 mg oral capsule, extended release: 1 cap(s) orally once a day  Multiple Vitamins oral tablet: 1 tab(s) orally once a day  Zocor 80 mg oral tablet: 1 tab(s) orally once a day (at bedtime)

## 2020-12-19 NOTE — PHYSICAL THERAPY INITIAL EVALUATION ADULT - DISCHARGE DISPOSITION, PT EVAL
Pt is functionally independent and requires no skilled physical therapy needs. Will discharge from physical therapy secondary to patient independent/home/no skilled PT needs

## 2020-12-19 NOTE — DISCHARGE NOTE PROVIDER - EXTENDED VTE OTHER REASON FREE TEXT
Not hypoxic.  Ambulatory.  Risk of bleeding outweigh benefit at this time.  Not hypoxic.  Ambulatory.  Risk of bleeding outweigh benefit at this time. Recommend ASA 81mg PO daily

## 2020-12-19 NOTE — CONSULT NOTE ADULT - SUBJECTIVE AND OBJECTIVE BOX
Request for consultation received  Chart/Labs/Imaging reviewed  Assessment to follow.  Anthony Velasquez MD  [phone]  ------------------------------  Full note to follow  Admitted with FTT N/V and decreased PO intake  Ill since at least 12/6  No role for RDV  On RA, comfortable, minimally symptomatic at this point.  CXR to my eye does not have infiltrates >50%. No concern of severe disease at present.  Precautions per protocol  DVT PPx per protocol  Thank you for the courtesy of this referral.  Anthony Velasquez MD  Attending Physician  [facility]  Division of Infectious Diseases  [phone]  ---------------------------  [facility]  Division of Infectious Diseases  [phone]    ANITRA RINCON  87y, Female  [id number]    HPI--      PMH/PSH--  Anxiety    Osteoarthritis    HLD (hyperlipidemia)    HTN (hypertension)    Macular degeneration    No significant past surgical history        Allergies--  Balsam of [place] (Urticaria)  COBALT (Urticaria)  Proplene Glycol (Urticaria)  steroids - numbness to face (Unknown)      Medications--  Antibiotics:   Immunologic:   Other: acetaminophen   Tablet .. PRN  amitriptyline  atorvastatin  enoxaparin Injectable  metoprolol succinate ER  multivitamin  potassium chloride    Tablet ER    Antimicrobials last 90 days per EMR: MEDICATIONS  (STANDING):        Social History--  EtOH: denies   Tobacco: denies   Drug Use: denies     Family/Marital History--  Family history of diabetes mellitus (DM)    FH: CAD (coronary artery disease)          Travel/Environmental/Occupational History:      Review of Systems:  A >=10-point review of systems was obtained.     Pertinent positives and negatives--  Constitutional: No fevers. No Chills. No Rigors.   Eyes:  ENMT:  Cardiovascular: No chest pain. No palpitations.  Respiratory: No shortness of breath. No cough.  Gastrointestinal: No nausea or vomiting. No diarrhea or constipation.   Genitourinary:  Musculoskeletal:  Skin:  Neurologic:  Psychiatric: Pleasant. Appropriate affect.  Endocrine:  Heme/Lymphatic:  Allergy/Immunologic:    Review of systems otherwise negative except as previously noted.    Physical Exam--  Vital Signs: T(F): 98.1 (12-19-20 @ 05:26), Max: 100.3 (12-18-20 @ 12:50)  HR: 73 (12-19-20 @ 05:26)  BP: 115/57 (12-19-20 @ 05:26)  RR: 17 (12-19-20 @ 05:26)  SpO2: 93% (12-19-20 @ 05:26)  Wt(kg): --  General: Nontoxic-appearing Female in no acute distress.  HEENT: AT/NC. PERRL. EOMI. Anicteric. Conjunctiva pink and moist. Oropharynx clear. Dentition fair.  Neck: Not rigid. No sense of mass.  Nodes: None palpable.  Lungs: Clear bilaterally without rales, wheezing or rhonchi  Heart: Regular rate and rhythm. No Murmur. No rub. No gallop. No palpable thrill.  Abdomen: Bowel sounds present and normoactive. Soft. Nondistended. Nontender. No sense of mass. No organomegaly.  Back: No spinal tenderness. No costovertebral angle tenderness.   Extremities: No cyanosis or clubbing. No edema.   Skin: Warm. Dry. Good turgor. No rash. No vasculitic stigmata.  Psychiatric: Appropriate affect and mood for situation.         Laboratory & Imaging Data--  CBC                        10.6   4.00  )-----------( 258      ( 19 Dec 2020 10:35 )             32.2       Chemistries  12-19    131<L>  |  99  |  8   ----------------------------<  85  3.3<L>   |  23  |  0.50    Ca    8.6      19 Dec 2020 10:35    TPro  6.9  /  Alb  2.8<L>  /  TBili  0.6  /  DBili  x   /  AST  43<H>  /  ALT  38  /  AlkPhos  79  12-19      Culture Data       Full note to follow  Admitted with FTT N/V and decreased PO intake  Ill since at least 12/6  No role for RDV  On RA, comfortable, minimally symptomatic at this point.  CXR to my eye does not have infiltrates >50%. No concern of severe disease at present.  Precautions per protocol  DVT PPx per protocol  Thank you for the courtesy of this referral.  Anthony Velasquez MD  Attending Physician  [facility]  Division of Infectious Diseases  [phone]  ---------------------------  [facility]  Division of Infectious Diseases  [phone]    ANITRA RINCON  87y, Female  [id number]    HPI--  87F, not a great historian presented to ER because not feeling well >= 2 weeks. During that time has had fever and cough, concerned about COVID. Patient called her doctor who told her to self quarantine. Patient denies prior testing. however in the 4-5 days she noted severe anorexia ("I could not even look at food") and also nausea/vomiting. The latter symptoms, her family being worried about her,  the lack of improvement in other symptoms, prompted her to call her doctor again. He suggested ER evaluation.     Denies any SOB or CP. Denies diarrhea. Denies anosmia. No chills or rigors. Feeling somewhat better now.     PMH/PSH--  Anxiety    Osteoarthritis    HLD (hyperlipidemia)    HTN (hypertension)    Macular degeneration    No significant past surgical history        Allergies--  Balsam of [place] (Urticaria)  COBALT (Urticaria)  Proplene Glycol (Urticaria)  steroids - numbness to face (Unknown)      Medications--  Antibiotics:   Immunologic:   Other: acetaminophen   Tablet .. PRN  amitriptyline  atorvastatin  enoxaparin Injectable  metoprolol succinate ER  multivitamin  potassium chloride    Tablet ER    Antimicrobials last 90 days per EMR: MEDICATIONS  (STANDING):        Social History--  EtOH: denies   Tobacco: denies   Drug Use: denies     Family/Marital History--  Family history of diabetes mellitus (DM)    FH: CAD (coronary artery disease)          Travel/Environmental/Occupational History:      Review of Systems:  A >=10-point review of systems was obtained.   Review of systems otherwise negative except as previously noted.    Physical Exam--  Vital Signs: T(F): 98.1 (12-19-20 @ 05:26), Max: 100.3 (12-18-20 @ 12:50)  HR: 73 (12-19-20 @ 05:26)  BP: 115/57 (12-19-20 @ 05:26)  RR: 17 (12-19-20 @ 05:26)  SpO2: 93% (12-19-20 @ 05:26)  Wt(kg): --  General: Nontoxic-appearing Female in no acute distress.  HEENT: AT/NC. PERRL. EOMI. Anicteric. Conjunctiva pink and moist. Oropharynx clear.  Neck: Not rigid. No sense of mass.  Nodes: None palpable.  Lungs: Diminished breath sounds bilaterally without rales, wheezing or rhonchi  Heart: Regular rate and rhythm. No Murmur. No rub. No gallop. No palpable thrill.  Abdomen: Bowel sounds present and normoactive. Soft. Nondistended. Nontender. No sense of mass. No organomegaly.  Back: No spinal tenderness. No costovertebral angle tenderness.   Extremities: No cyanosis or clubbing. No edema.   Skin: Warm. Dry. Good turgor. No rash. No vasculitic stigmata.  Psychiatric: Appropriate affect and mood for situation.       Lactate Dehydrogenase, Serum (12.19.20 @ 05:08)    Lactate Dehydrogenase, Serum: 331 U/L      Laboratory & Imaging Data--  CBC                        10.6   4.00  )-----------( 258      ( 19 Dec 2020 10:35 )             32.2       Chemistries  12-19    131<L>  |  99  |  8   ----------------------------<  85  3.3<L>   |  23  |  0.50    Ca    8.6      19 Dec 2020 10:35    TPro  6.9  /  Alb  2.8<L>  /  TBili  0.6  /  DBili  x   /  AST  43<H>  /  ALT  38  /  AlkPhos  79  12-19    COVID-19 PCR: Detected: You can help in the fight against COVID-19. [facility] may contact  you to see if you are interested in voluntarily participating in one of  our clinical trials.  Testing is performed using polymerase chain reaction (PCR) or  transcription mediated amplification (TMA). This COVID-19 (SARS-CoV-2)  nucleic acid amplification test was validated by [facility] and is  in use under the FDA Emergency Use Authorization (EUA) for clinical labs  CLIA-certified to perform high complexity testing. Test results should be  correlated with clinical presentation, patient history, and epidemiology. (12.18.20 @ 14:17)    Lactate Dehydrogenase, Serum: 331 U/L (12.19.20 @ 05:08)  D-Dimer Assay, Quantitative: 491 ng/mL DDU (12.18.20 @ 23:09)  Ferritin, Serum: 456 ng/mL (12.19.20 @ 05:08)  No CRP  Auto Neutrophil #: 3.35 K/uL (12.18.20 @ 14:17)  Auto Lymphocyte #: 0.93 K/uL (12.18.20 @ 14:17)      Culture Data  None    < from: Xray Chest 1 View- PORTABLE-Urgent (12.18.20 @ 14:29) >  INTERPRETATION:  Short of breath cough fever.    AP chest. Prior to 20/5/2013.    Low lung volumes. No change heart mediastinum. Scattered bilateral patchy parenchymal opacities greatest in the left perihilar region suspicious for multifocal viral/atypical pneumonia. Recommend close follow-up.    Impression: Bilateral patchy opacities  suspicious for multifocal viral/atypical pneumonia. Recommend close follow-up    < end of copied text >

## 2020-12-19 NOTE — CONSULT NOTE ADULT - ASSESSMENT
87F Admitted with FTT N/V and decreased PO intake, now disgnosed +COVID but Ill since at least 12/6.  No role for RDV given duration of illness.  Does not appear to meet criteria for severe disease at present. On RA, comfortable with sat 94% at time of my assessment, minimally symptomatic at this point. CXR to my eye does not have infiltrates >50%. No concern of severe disease at present. Does not merit decadron.  Does not meet criteria for monoclonal antibody.  Inflammatory markers available do not suggest high likelihood of progression.    Suggestions--  Precautions per protocol, ideally airborne and contact in negative pressure room  Supplemental oxygen as required to maintain adequate saturation.  Avoid NIPPV, high flow O2, nebulilzers, or other interventions which may increase the likelihood of aerosolization as much as feasible  High threshold for antibiotic use  Vigilance for secondary infection and other superimposed events  Monitor inflammatory markers and lab data  DVT prophylaxis per protocol    Thank you for the courtesy of this referral.  Anthony Velasquez MD  Attending Physician  [facility]  Division of Infectious Diseases  [phone]

## 2020-12-19 NOTE — PHYSICAL THERAPY INITIAL EVALUATION ADULT - ASR WT BEARING STATUS EVAL
----- Message from Josef Falcon sent at 3/3/2017 12:44 PM CST -----  Contact: pt   ..Patient calling to speak with coordinator about RX, please call    no weight-bearing restrictions

## 2020-12-19 NOTE — DISCHARGE NOTE PROVIDER - CARE PROVIDER_API CALL
Igor Moseley   INTERNAL MEDICINE  [address] [place] [id number]  Phone: [phone]  Fax: [phone]  Follow Up Time:

## 2020-12-19 NOTE — DISCHARGE NOTE PROVIDER - NSDCCPCAREPLAN_GEN_ALL_CORE_FT
PRINCIPAL DISCHARGE DIAGNOSIS  Diagnosis: COVID-19  Assessment and Plan of Treatment: Your oxygenation has been in the 90th% on room air.  As a result you did not need steroids.  Please continue quarantine for 2 weeks and monitor your respiratory status.  Follow up with your PMD in 1-2 weeks.      SECONDARY DISCHARGE DIAGNOSES  Diagnosis: HTN (hypertension)  Assessment and Plan of Treatment: Continue your antihypertensive medications.

## 2020-12-19 NOTE — DISCHARGE NOTE PROVIDER - HOSPITAL COURSE
Patient is a 87 year-old female with PMHx of HTN and HLD presenting to emergency department with complaint cough and fever for 14days. Symptoms started on 12/6. Patient called PCP and encouraged to self quarantine for 14days. During this time, her cough improved but she notes decreased appetite, and PO water intake, temperature no higher than 100 at home, feeling weak, unable to sleep, and nausea. Patient denies chest pain, palpitations, SOB, abdominal pain, vomiting, numbness, tingling, or chills.    The date the pt first felt unwell: 12/6  Fever or chills: yes [ x ]   no [  ]   - 100 Tmax:   Fatigue, malaise or generalized weakness: yes [x  ]   no [  ]  Shortness of breath/dyspnea: yes [  ]   no [ x ]  Cough: yes [ x ]   no [  ], sputum production: yes [  ]   no [ x ]  Blood in sputum: yes [  ]   no [ x ]  Anorexia/po intolerance: yes [ x ]   no [  ]  Chest pain or chest tightness: yes [  ]   no [ x ]  Edema in legs: yes [  ]   no [ x ]  Myalgias: yes [  ]   no [ x ]  Headache: yes [  ]   no [ x ]  Sore throat: yes [  ]   no [ x ]  Rhinorrhea: yes [  ]   no [ x ]  Abd pain: yes [  ]   no [ x ]  Nausea: yes [ x ]   no [  ]  Vomiting: yes [  ]   no [ x ]  Diarrhea: yes [  ]   no [ x ]  Skin rashes: yes [  ]   no [ x ]  Loss of sense of smell/anosmia: yes [ x ]   no [  ]  Conjunctivitis: yes [  ]   no [ x ]  Recent travel: yes [  ]   no [ x ] - Location:   Any sick contacts: yes [  ]   no [ x ]  Close contact with someone confirmed positive with COVID-19 / SARS-CoV2 in the last 14 days: yes [  ]   no [ x ]  Code status: Full    ED course:  VS: T 100.3, , /58, RR 18, SpO2 99% on RA  Significant Labs: WBC 4.65, H/H 10.6/31, BUN/Cr 16/.56, Lactate .6, trop neg, procal neg  UA: neg  COVID POSITIVE  EKG: NSR, , QTc 406  CXR: Bilateral patchy opacities  suspicious for multifocal viral/atypical pneumonia  Given: Tylenol x1, 1L NS bolus    Pt. was admitted with ID consultation.  She remained on room air with SpO2 in 90th%.  Pt. was not hypoxic and as a result no need for decadron.  She was also out of window for Remdesivir.   She was assessed by physical therapy... Patient is a 87 year-old female with PMHx of HTN and HLD presenting to emergency department with complaint cough and fever for 14days. Symptoms started on 12/6. Patient called PCP and encouraged to self quarantine for 14days. During this time, her cough improved but she notes decreased appetite, and PO water intake, temperature no higher than 100 at home, feeling weak, unable to sleep, and nausea. Patient denies chest pain, palpitations, SOB, abdominal pain, vomiting, numbness, tingling, or chills.    The date the pt first felt unwell: 12/6  Fever or chills: yes [ x ]   no [  ]   - 100 Tmax:   Fatigue, malaise or generalized weakness: yes [x  ]   no [  ]  Shortness of breath/dyspnea: yes [  ]   no [ x ]  Cough: yes [ x ]   no [  ], sputum production: yes [  ]   no [ x ]  Blood in sputum: yes [  ]   no [ x ]  Anorexia/po intolerance: yes [ x ]   no [  ]  Chest pain or chest tightness: yes [  ]   no [ x ]  Edema in legs: yes [  ]   no [ x ]  Myalgias: yes [  ]   no [ x ]  Headache: yes [  ]   no [ x ]  Sore throat: yes [  ]   no [ x ]  Rhinorrhea: yes [  ]   no [ x ]  Abd pain: yes [  ]   no [ x ]  Nausea: yes [ x ]   no [  ]  Vomiting: yes [  ]   no [ x ]  Diarrhea: yes [  ]   no [ x ]  Skin rashes: yes [  ]   no [ x ]  Loss of sense of smell/anosmia: yes [ x ]   no [  ]  Conjunctivitis: yes [  ]   no [ x ]  Recent travel: yes [  ]   no [ x ] - Location:   Any sick contacts: yes [  ]   no [ x ]  Close contact with someone confirmed positive with COVID-19 / SARS-CoV2 in the last 14 days: yes [  ]   no [ x ]  Code status: Full    ED course:  VS: T 100.3, , /58, RR 18, SpO2 99% on RA  Significant Labs: WBC 4.65, H/H 10.6/31, BUN/Cr 16/.56, Lactate .6, trop neg, procal neg  UA: neg  COVID POSITIVE  EKG: NSR, , QTc 406  CXR: Bilateral patchy opacities  suspicious for multifocal viral/atypical pneumonia  Given: Tylenol x1, 1L NS bolus    Pt. was admitted with ID consultation.  She remained on room air with SpO2 in 90th%.  Pt. was not hypoxic and as a result no need for decadron.  She was also outside of window for Remdesivir.   Blood cx showed no growth and urine cx was negative.  She was assessed by physical therapy who felt that she had no skilled PT needs.      On day of discharge patient is in no distress.  Afebrile and hemodynamically stable.  On room air with SPO2 in 90th%.    Completion of discharge in 33 minutes.

## 2020-12-20 ENCOUNTER — TRANSCRIPTION ENCOUNTER (OUTPATIENT)
Age: 85
End: 2020-12-20

## 2020-12-20 LAB
ANION GAP SERPL CALC-SCNC: 7 MMOL/L — SIGNIFICANT CHANGE UP (ref 5–17)
BASOPHILS # BLD AUTO: 0.01 K/UL — SIGNIFICANT CHANGE UP (ref 0–0.2)
BASOPHILS NFR BLD AUTO: 0.2 % — SIGNIFICANT CHANGE UP (ref 0–2)
BUN SERPL-MCNC: 10 MG/DL — SIGNIFICANT CHANGE UP (ref 7–23)
CALCIUM SERPL-MCNC: 9.3 MG/DL — SIGNIFICANT CHANGE UP (ref 8.5–10.1)
CHLORIDE SERPL-SCNC: 100 MMOL/L — SIGNIFICANT CHANGE UP (ref 96–108)
CO2 SERPL-SCNC: 26 MMOL/L — SIGNIFICANT CHANGE UP (ref 22–31)
CREAT SERPL-MCNC: 0.56 MG/DL — SIGNIFICANT CHANGE UP (ref 0.5–1.3)
EOSINOPHIL # BLD AUTO: 0.06 K/UL — SIGNIFICANT CHANGE UP (ref 0–0.5)
EOSINOPHIL NFR BLD AUTO: 1.3 % — SIGNIFICANT CHANGE UP (ref 0–6)
GLUCOSE SERPL-MCNC: 96 MG/DL — SIGNIFICANT CHANGE UP (ref 70–99)
HCT VFR BLD CALC: 33.7 % — LOW (ref 34.5–45)
HGB BLD-MCNC: 11.3 G/DL — LOW (ref 11.5–15.5)
IMM GRANULOCYTES NFR BLD AUTO: 0.4 % — SIGNIFICANT CHANGE UP (ref 0–1.5)
LYMPHOCYTES # BLD AUTO: 1.21 K/UL — SIGNIFICANT CHANGE UP (ref 1–3.3)
LYMPHOCYTES # BLD AUTO: 25.5 % — SIGNIFICANT CHANGE UP (ref 13–44)
MAGNESIUM SERPL-MCNC: 2.1 MG/DL — SIGNIFICANT CHANGE UP (ref 1.6–2.6)
MCHC RBC-ENTMCNC: 32.1 PG — SIGNIFICANT CHANGE UP (ref 27–34)
MCHC RBC-ENTMCNC: 33.5 GM/DL — SIGNIFICANT CHANGE UP (ref 32–36)
MCV RBC AUTO: 95.7 FL — SIGNIFICANT CHANGE UP (ref 80–100)
MONOCYTES # BLD AUTO: 0.6 K/UL — SIGNIFICANT CHANGE UP (ref 0–0.9)
MONOCYTES NFR BLD AUTO: 12.7 % — SIGNIFICANT CHANGE UP (ref 2–14)
NEUTROPHILS # BLD AUTO: 2.84 K/UL — SIGNIFICANT CHANGE UP (ref 1.8–7.4)
NEUTROPHILS NFR BLD AUTO: 59.9 % — SIGNIFICANT CHANGE UP (ref 43–77)
NRBC # BLD: 0 /100 WBCS — SIGNIFICANT CHANGE UP (ref 0–0)
PLATELET # BLD AUTO: 290 K/UL — SIGNIFICANT CHANGE UP (ref 150–400)
POTASSIUM SERPL-MCNC: 4.6 MMOL/L — SIGNIFICANT CHANGE UP (ref 3.5–5.3)
POTASSIUM SERPL-SCNC: 4.6 MMOL/L — SIGNIFICANT CHANGE UP (ref 3.5–5.3)
RBC # BLD: 3.52 M/UL — LOW (ref 3.8–5.2)
RBC # FLD: 12.3 % — SIGNIFICANT CHANGE UP (ref 10.3–14.5)
SODIUM SERPL-SCNC: 133 MMOL/L — LOW (ref 135–145)
WBC # BLD: 4.74 K/UL — SIGNIFICANT CHANGE UP (ref 3.8–10.5)
WBC # FLD AUTO: 4.74 K/UL — SIGNIFICANT CHANGE UP (ref 3.8–10.5)

## 2020-12-20 PROCEDURE — 99232 SBSQ HOSP IP/OBS MODERATE 35: CPT

## 2020-12-20 PROCEDURE — 99239 HOSP IP/OBS DSCHRG MGMT >30: CPT

## 2020-12-20 RX ORDER — ACETAMINOPHEN 500 MG
2 TABLET ORAL
Qty: 0 | Refills: 0 | DISCHARGE
Start: 2020-12-20

## 2020-12-20 RX ADMIN — ENOXAPARIN SODIUM 40 MILLIGRAM(S): 100 INJECTION SUBCUTANEOUS at 11:15

## 2020-12-20 RX ADMIN — Medication 1 TABLET(S): at 11:15

## 2020-12-20 RX ADMIN — Medication 25 MILLIGRAM(S): at 05:08

## 2020-12-20 NOTE — DISCHARGE NOTE NURSING/CASE MANAGEMENT/SOCIAL WORK - PATIENT PORTAL LINK FT
You can access the FollowMyHealth Patient Portal offered by [facility] by registering at the following website: [email or web address]. By joining "[employer]’s FollowMyHealth portal, you will also be able to view your health information using other applications (apps) compatible with our system.

## 2020-12-20 NOTE — PROGRESS NOTE ADULT - SUBJECTIVE AND OBJECTIVE BOX
CHIEF COMPLAINT/INTERVAL HISTORY:  Pt. seen and evaluated for COVID 19 pneumonia.  Pt. is in no distress.  Ambulating in her room.  Denies SOB.  On room air with SPO2 in 90th%.    REVIEW OF SYSTEMS:  No fever, CP, or abdominal pain.     Vital Signs Last 24 Hrs  T(C): 36.8 (19 Dec 2020 20:05), Max: 37 (19 Dec 2020 14:53)  T(F): 98.2 (19 Dec 2020 20:05), Max: 98.6 (19 Dec 2020 14:53)  HR: 82 (19 Dec 2020 20:05) (80 - 82)  BP: 116/70 (19 Dec 2020 20:05) (116/70 - 123/62)  BP(mean): --  RR: 18 (19 Dec 2020 20:05) (17 - 18)  SpO2: 94% (19 Dec 2020 20:05) (94% - 95%)    PHYSICAL EXAM:  GENERAL: NAD  HEENT: EOMI, hearing normal, conjunctiva and sclera clear  Chest: Diminished BS at bases, no wheezing  CV: S1S2, RRR,   GI: soft, +BS, NT/ND  Musculoskeletal: no edema  Psychiatric: affect nL, mood nL  Skin: warm and dry    LABS:                        10.6   4.00  )-----------( 258      ( 19 Dec 2020 10:35 )             32.2     12-19    131<L>  |  99  |  8   ----------------------------<  85  3.3<L>   |  23  |  0.50    Ca    8.6      19 Dec 2020 10:35    TPro  6.9  /  Alb  2.8<L>  /  TBili  0.6  /  DBili  x   /  AST  43<H>  /  ALT  38  /  AlkPhos  79  12-19      Urinalysis Basic - ( 18 Dec 2020 19:32 )    Color: Yellow / Appearance: Clear / S.010 / pH: x  Gluc: x / Ketone: Negative  / Bili: Negative / Urobili: Negative   Blood: x / Protein: 15 / Nitrite: Negative   Leuk Esterase: Negative / RBC: x / WBC 0-2   Sq Epi: x / Non Sq Epi: Occasional / Bacteria: Occasional        Assessment and Plan:  -COVID 19 pneumonia:  Not hypoxic.  Decadron not indicated.  Out of time frame for Remdesivir.  Tylenol PRN.  ID consult appreciated.  -Generalized weakness:  2/2 COVID 19 infection.  Physical therapy evaluation recommending no skilled PT needs.   -HTN:  continue Toprol XL 25mg PO daily.  Currently holding Lisinopril 10mg PO daily.    -HLD:  continue statin therapy  -Anxiety d/o:  Amitriptyline 25mg PO QHS  -VTE ppx:  Lovenox 40mg SQ daily
CHIEF COMPLAINT/INTERVAL HISTORY:  Pt. seen and evaluated for COVID 19 pneumonia.  Pt. is in no distress.  On room air with SpO2 in low 90th%.  Denies having any SOB.      REVIEW OF SYSTEMS:  No fever, CP, or abdominal pain.     Vital Signs Last 24 Hrs  T(C): 36.7 (19 Dec 2020 05:26), Max: 37.9 (18 Dec 2020 12:50)  T(F): 98.1 (19 Dec 2020 05:26), Max: 100.3 (18 Dec 2020 12:50)  HR: 73 (19 Dec 2020 05:26) (69 - 103)  BP: 115/57 (19 Dec 2020 05:26) (115/57 - 120/65)  BP(mean): --  RR: 17 (19 Dec 2020 05:26) (16 - 18)  SpO2: 93% (19 Dec 2020 05:26) (93% - 99%)    PHYSICAL EXAM:  GENERAL: NAD  HEENT: EOMI, hearing normal, conjunctiva and sclera clear  Chest: Diminished BS at bases, no wheezing  CV: S1S2, RRR,   GI: soft, +BS, NT/ND  Musculoskeletal: no edema  Psychiatric: affect nL, mood nL  Skin: warm and dry    LABS:                        10.6   4.65  )-----------( 246      ( 18 Dec 2020 14:17 )             31.0     12-18    131<L>  |  99  |  16  ----------------------------<  103<H>  3.8   |  24  |  0.54    Ca    8.7      18 Dec 2020 14:17    TPro  6.8  /  Alb  2.8<L>  /  TBili  0.5  /  DBili  x   /  AST  31  /  ALT  29  /  AlkPhos  73  12-18      Urinalysis Basic - ( 18 Dec 2020 19:32 )    Color: Yellow / Appearance: Clear / S.010 / pH: x  Gluc: x / Ketone: Negative  / Bili: Negative / Urobili: Negative   Blood: x / Protein: 15 / Nitrite: Negative   Leuk Esterase: Negative / RBC: x / WBC 0-2   Sq Epi: x / Non Sq Epi: Occasional / Bacteria: Occasional        Assessment and Plan:  -COVID 19 pneumonia:  Not hypoxic.  Decadron not indicated.  Out of time frame for Remdesivir.  Tylenol PRN.  ID consult.   -Generalized weakness:  2/2 COVID 19 infection.  Physical therapy evaluation.   -HTN:  continue Toprol XL 25mg PO daily.  Currently holding Lisinopril 10mg PO daily.    -HLD:  continue statin therapy  -Anxiety d/o:  Amitriptyline 25mg PO QHS  -VTE ppx:  Lovenox 40mg SQ daily  
ANITRA RINCON  MRN-[id number]    Follow Up:  ID following for COVID19    Interval History: ambulating around room, she feels well and is ready to go home. Confused about when and what she is allowed to do when she goes home. Spent a while talking to her about precautions and isolation etc.     ROS:    [ ] Unobtainable because:  [X ] All other systems negative    Constitutional: no fever, no chills  Head: no trauma  Eyes: no vision changes, no eye pain  ENT:  no sore throat, no rhinorrhea  Cardiovascular:  no chest pain, no palpitation  Respiratory:  no SOB, no cough  GI:  no abd pain, no vomiting, no diarrhea  urinary: no dysuria, no hematuria, no flank pain  musculoskeletal:  no joint pain, no joint swelling  skin:  no rash  neurology:  no headache, no seizure, no change in mental status  psych: no anxiety, no depression         Allergies  Balsam of [place] (Urticaria)  COBALT (Urticaria)  Proplene Glycol (Urticaria)  steroids - numbness to face (Unknown)        ANTIMICROBIALS:      OTHER MEDS:  acetaminophen   Tablet .. 650 milliGRAM(s) Oral every 4 hours PRN  amitriptyline 25 milliGRAM(s) Oral at bedtime  atorvastatin 40 milliGRAM(s) Oral at bedtime  enoxaparin Injectable 40 milliGRAM(s) SubCutaneous daily  metoprolol succinate ER 25 milliGRAM(s) Oral daily  multivitamin 1 Tablet(s) Oral daily      Physical Exam:  Vital Signs Last 24 Hrs  T(C): 36.8 (19 Dec 2020 20:05), Max: 37 (19 Dec 2020 14:53)  T(F): 98.2 (19 Dec 2020 20:05), Max: 98.6 (19 Dec 2020 14:53)  HR: 82 (19 Dec 2020 20:05) (80 - 82)  BP: 116/70 (19 Dec 2020 20:05) (116/70 - 123/62)  BP(mean): --  RR: 18 (19 Dec 2020 20:05) (17 - 18)  SpO2: 94% (19 Dec 2020 20:05) (94% - 95%)    General:    NAD,  non toxic  Head: atraumatic, normocephalic  Eye: normal sclera and conjunctiva  ENT:    no oral lesions, neck supple  Cardio:     regular S1, S2,  no murmur  Respiratory:    clear b/l,    no wheezing  abd:     soft,   BS +,   no tenderness  :   no CVAT,  no suprapubic tenderness,   no  watson  Musculoskeletal:   no joint swelling,   no edema  vascular: no central lines, +PIV   Skin:    no rash  Neurologic:     no focal deficit  psych: normal affect    WBC Count: 4.74 K/uL ( @ 09:06)  WBC Count: 4.00 K/uL ( @ 10:35)  WBC Count: 4.65 K/uL ( @ 14:17)                            11.3   4.74  )-----------( 290      ( 20 Dec 2020 09:06 )             33.7           133<L>  |  100  |  10  ----------------------------<  96  4.6   |  26  |  0.56    Ca    9.3      20 Dec 2020 09:06  Mg     2.1         TPro  6.9  /  Alb  2.8<L>  /  TBili  0.6  /  DBili  x   /  AST  43<H>  /  ALT  38  /  AlkPhos  79        Urinalysis Basic - ( 18 Dec 2020 19:32 )    Color: Yellow / Appearance: Clear / S.010 / pH: x  Gluc: x / Ketone: Negative  / Bili: Negative / Urobili: Negative   Blood: x / Protein: 15 / Nitrite: Negative   Leuk Esterase: Negative / RBC: x / WBC 0-2   Sq Epi: x / Non Sq Epi: Occasional / Bacteria: Occasional        Creatinine Trend: 0.56<--, 0.50<--, 0.54<--      MICROBIOLOGY:  v  .Urine Clean Catch (Midstream)  20   <10,000 CFU/mL Normal Urogenital [place]  --  --      .Blood Blood-Peripheral  20   No growth to date.  --  --      COVID- PCR: Detected ()    Ferritin, Serum: 456 ()      D-Dimer Assay, Quantitative: 491 ()    Procalcitonin, Serum: <0.05 (20 @ 14:17)      RADIOLOGY:  no new images

## 2020-12-20 NOTE — PROGRESS NOTE ADULT - ASSESSMENT
87F Admitted with FTT N/V and decreased PO intake, now diagnosed +COVID but Ill since at least 12/6.  No role for RDV given duration of illness.  Does not appear to meet criteria for severe disease at present. On RA, comfortable with sat 94%, minimally symptomatic at this point.   CXR to my eye does not have infiltrates >50%. No concern of severe disease at present. Does not merit decadron.  Does not meet criteria for monoclonal antibody.  Inflammatory markers available do not suggest high likelihood of progression.    12/20:doing well on RA. has been ill since 12/5 or so. Likely at the tail end of her illness. Ambulating. Discharge today seems appropriate.     Suggestions--  OK for discharge from an ID standpoint: risks:benefit for outpatient anticoagulation per medicine   Precautions per protocol, ideally airborne and contact in negative pressure room though likely not infectious anymore if this started 15 days ago   Supplemental oxygen as required to maintain adequate saturation.  Avoid NIPPV, high flow O2, nebulilzers, or other interventions which may increase the likelihood of aerosolization as much as feasible  High threshold for antibiotic use  Vigilance for secondary infection and other superimposed events  Monitor inflammatory markers and lab data  DVT prophylaxis per protocol    D/W Dr. Israel Saleh, DO  Cell: [phone]  Pager [phone]  Infectious Disease Attending  After 5pm/weekends please call [phone] for all inquiries and new consults

## 2020-12-23 LAB
CULTURE RESULTS: SIGNIFICANT CHANGE UP
CULTURE RESULTS: SIGNIFICANT CHANGE UP
SPECIMEN SOURCE: SIGNIFICANT CHANGE UP
SPECIMEN SOURCE: SIGNIFICANT CHANGE UP

## 2020-12-28 PROCEDURE — 82728 ASSAY OF FERRITIN: CPT

## 2020-12-28 PROCEDURE — 71045 X-RAY EXAM CHEST 1 VIEW: CPT

## 2020-12-28 PROCEDURE — 36415 COLL VENOUS BLD VENIPUNCTURE: CPT

## 2020-12-28 PROCEDURE — 83615 LACTATE (LD) (LDH) ENZYME: CPT

## 2020-12-28 PROCEDURE — U0003: CPT

## 2020-12-28 PROCEDURE — 84145 PROCALCITONIN (PCT): CPT

## 2020-12-28 PROCEDURE — 87086 URINE CULTURE/COLONY COUNT: CPT

## 2020-12-28 PROCEDURE — 87040 BLOOD CULTURE FOR BACTERIA: CPT

## 2020-12-28 PROCEDURE — 85379 FIBRIN DEGRADATION QUANT: CPT

## 2020-12-28 PROCEDURE — 84484 ASSAY OF TROPONIN QUANT: CPT

## 2020-12-28 PROCEDURE — 83605 ASSAY OF LACTIC ACID: CPT

## 2020-12-28 PROCEDURE — 81001 URINALYSIS AUTO W/SCOPE: CPT

## 2020-12-28 PROCEDURE — 83735 ASSAY OF MAGNESIUM: CPT

## 2020-12-28 PROCEDURE — 86769 SARS-COV-2 COVID-19 ANTIBODY: CPT

## 2020-12-28 PROCEDURE — 83690 ASSAY OF LIPASE: CPT

## 2020-12-28 PROCEDURE — 93005 ELECTROCARDIOGRAM TRACING: CPT

## 2020-12-28 PROCEDURE — 85025 COMPLETE CBC W/AUTO DIFF WBC: CPT

## 2020-12-28 PROCEDURE — 80048 BASIC METABOLIC PNL TOTAL CA: CPT

## 2020-12-28 PROCEDURE — 80053 COMPREHEN METABOLIC PANEL: CPT

## 2020-12-28 PROCEDURE — 99285 EMERGENCY DEPT VISIT HI MDM: CPT

## 2020-12-28 PROCEDURE — 97161 PT EVAL LOW COMPLEX 20 MIN: CPT

## 2022-05-06 ENCOUNTER — EMERGENCY (EMERGENCY)
Facility: HOSPITAL | Age: 87
LOS: 1 days | Discharge: HOME | End: 2022-05-06
Attending: EMERGENCY MEDICINE | Admitting: EMERGENCY MEDICINE
Payer: MEDICARE

## 2022-05-06 VITALS
TEMPERATURE: 98 F | SYSTOLIC BLOOD PRESSURE: 149 MMHG | OXYGEN SATURATION: 100 % | RESPIRATION RATE: 18 BRPM | HEART RATE: 69 BPM | DIASTOLIC BLOOD PRESSURE: 79 MMHG

## 2022-05-06 VITALS
RESPIRATION RATE: 16 BRPM | OXYGEN SATURATION: 100 % | SYSTOLIC BLOOD PRESSURE: 133 MMHG | TEMPERATURE: 98 F | WEIGHT: 143.96 LBS | HEIGHT: 62 IN | DIASTOLIC BLOOD PRESSURE: 74 MMHG | HEART RATE: 80 BPM

## 2022-05-06 PROBLEM — H35.30 UNSPECIFIED MACULAR DEGENERATION: Chronic | Status: ACTIVE | Noted: 2020-12-18

## 2022-05-06 PROBLEM — E78.5 HYPERLIPIDEMIA, UNSPECIFIED: Chronic | Status: ACTIVE | Noted: 2020-12-18

## 2022-05-06 PROBLEM — I10 ESSENTIAL (PRIMARY) HYPERTENSION: Chronic | Status: ACTIVE | Noted: 2020-12-18

## 2022-05-06 PROBLEM — M19.90 UNSPECIFIED OSTEOARTHRITIS, UNSPECIFIED SITE: Chronic | Status: ACTIVE | Noted: 2020-12-18

## 2022-05-06 PROBLEM — F41.9 ANXIETY DISORDER, UNSPECIFIED: Chronic | Status: ACTIVE | Noted: 2020-12-18

## 2022-05-06 LAB
ALBUMIN SERPL ELPH-MCNC: 3.8 G/DL — SIGNIFICANT CHANGE UP (ref 3.3–5)
ALP SERPL-CCNC: 72 U/L — SIGNIFICANT CHANGE UP (ref 40–120)
ALT FLD-CCNC: 34 U/L — SIGNIFICANT CHANGE UP (ref 12–78)
ANION GAP SERPL CALC-SCNC: 8 MMOL/L — SIGNIFICANT CHANGE UP (ref 5–17)
APTT BLD: 30.8 SEC — SIGNIFICANT CHANGE UP (ref 27.5–35.5)
AST SERPL-CCNC: 19 U/L — SIGNIFICANT CHANGE UP (ref 15–37)
BASOPHILS # BLD AUTO: 0 K/UL — SIGNIFICANT CHANGE UP (ref 0–0.2)
BASOPHILS NFR BLD AUTO: 0 % — SIGNIFICANT CHANGE UP (ref 0–2)
BILIRUB SERPL-MCNC: 0.3 MG/DL — SIGNIFICANT CHANGE UP (ref 0.2–1.2)
BUN SERPL-MCNC: 15 MG/DL — SIGNIFICANT CHANGE UP (ref 7–23)
CALCIUM SERPL-MCNC: 9.4 MG/DL — SIGNIFICANT CHANGE UP (ref 8.5–10.1)
CHLORIDE SERPL-SCNC: 108 MMOL/L — SIGNIFICANT CHANGE UP (ref 96–108)
CO2 SERPL-SCNC: 24 MMOL/L — SIGNIFICANT CHANGE UP (ref 22–31)
CREAT SERPL-MCNC: 0.67 MG/DL — SIGNIFICANT CHANGE UP (ref 0.5–1.3)
EGFR: 84 ML/MIN/1.73M2 — SIGNIFICANT CHANGE UP
EOSINOPHIL # BLD AUTO: 0.03 K/UL — SIGNIFICANT CHANGE UP (ref 0–0.5)
EOSINOPHIL NFR BLD AUTO: 1.4 % — SIGNIFICANT CHANGE UP (ref 0–6)
GLUCOSE SERPL-MCNC: 103 MG/DL — HIGH (ref 70–99)
HCT VFR BLD CALC: 23.4 % — LOW (ref 34.5–45)
HGB BLD-MCNC: 7.7 G/DL — LOW (ref 11.5–15.5)
IMM GRANULOCYTES NFR BLD AUTO: 0.9 % — SIGNIFICANT CHANGE UP (ref 0–1.5)
INR BLD: 1.08 RATIO — SIGNIFICANT CHANGE UP (ref 0.88–1.16)
LYMPHOCYTES # BLD AUTO: 0.84 K/UL — LOW (ref 1–3.3)
LYMPHOCYTES # BLD AUTO: 39.6 % — SIGNIFICANT CHANGE UP (ref 13–44)
MCHC RBC-ENTMCNC: 32.9 GM/DL — SIGNIFICANT CHANGE UP (ref 32–36)
MCHC RBC-ENTMCNC: 39.7 PG — HIGH (ref 27–34)
MCV RBC AUTO: 120.6 FL — HIGH (ref 80–100)
MONOCYTES # BLD AUTO: 0.11 K/UL — SIGNIFICANT CHANGE UP (ref 0–0.9)
MONOCYTES NFR BLD AUTO: 5.2 % — SIGNIFICANT CHANGE UP (ref 2–14)
NEUTROPHILS # BLD AUTO: 1.12 K/UL — LOW (ref 1.8–7.4)
NEUTROPHILS NFR BLD AUTO: 52.9 % — SIGNIFICANT CHANGE UP (ref 43–77)
NRBC # BLD: 0 /100 WBCS — SIGNIFICANT CHANGE UP (ref 0–0)
OB PNL STL: NEGATIVE — SIGNIFICANT CHANGE UP
PLATELET # BLD AUTO: 200 K/UL — SIGNIFICANT CHANGE UP (ref 150–400)
POTASSIUM SERPL-MCNC: 4 MMOL/L — SIGNIFICANT CHANGE UP (ref 3.5–5.3)
POTASSIUM SERPL-SCNC: 4 MMOL/L — SIGNIFICANT CHANGE UP (ref 3.5–5.3)
PROT SERPL-MCNC: 7.1 G/DL — SIGNIFICANT CHANGE UP (ref 6–8.3)
PROTHROM AB SERPL-ACNC: 12.6 SEC — SIGNIFICANT CHANGE UP (ref 10.5–13.4)
RBC # BLD: 1.94 M/UL — LOW (ref 3.8–5.2)
RBC # FLD: 14.9 % — HIGH (ref 10.3–14.5)
SODIUM SERPL-SCNC: 140 MMOL/L — SIGNIFICANT CHANGE UP (ref 135–145)
WBC # BLD: 2.12 K/UL — LOW (ref 3.8–10.5)
WBC # FLD AUTO: 2.12 K/UL — LOW (ref 3.8–10.5)

## 2022-05-06 PROCEDURE — P9016: CPT

## 2022-05-06 PROCEDURE — 82272 OCCULT BLD FECES 1-3 TESTS: CPT

## 2022-05-06 PROCEDURE — 36430 TRANSFUSION BLD/BLD COMPNT: CPT

## 2022-05-06 PROCEDURE — 86901 BLOOD TYPING SEROLOGIC RH(D): CPT

## 2022-05-06 PROCEDURE — 86850 RBC ANTIBODY SCREEN: CPT

## 2022-05-06 PROCEDURE — 36415 COLL VENOUS BLD VENIPUNCTURE: CPT

## 2022-05-06 PROCEDURE — 99285 EMERGENCY DEPT VISIT HI MDM: CPT | Mod: 25

## 2022-05-06 PROCEDURE — 99284 EMERGENCY DEPT VISIT MOD MDM: CPT

## 2022-05-06 PROCEDURE — 86923 COMPATIBILITY TEST ELECTRIC: CPT

## 2022-05-06 PROCEDURE — 80053 COMPREHEN METABOLIC PANEL: CPT

## 2022-05-06 PROCEDURE — 86900 BLOOD TYPING SEROLOGIC ABO: CPT

## 2022-05-06 PROCEDURE — 85610 PROTHROMBIN TIME: CPT

## 2022-05-06 PROCEDURE — 85025 COMPLETE CBC W/AUTO DIFF WBC: CPT

## 2022-05-06 PROCEDURE — 85730 THROMBOPLASTIN TIME PARTIAL: CPT

## 2022-05-06 NOTE — ED PROVIDER NOTE - NS ED ATTENDING STATEMENT MOD
This was a shared visit with the DARREN. I reviewed and verified the documentation and independently performed the documented:

## 2022-05-06 NOTE — ED PROVIDER NOTE - OBJECTIVE STATEMENT
Pt is a 89 yo female with pmhx of anxiety HTN HLD OA macular degeneration here for low hgb 7.6 and 3/22/21 12.5 pt c/o of weakness and fatigue for a few months denies any chest pain sob fever chills no dark stools no abdominal pain no blood in urine.

## 2022-05-06 NOTE — ED PROVIDER NOTE - PATIENT PORTAL LINK FT
You can access the FollowMyHealth Patient Portal offered by [facility] by registering at the following website: [email or web address]. By joining [employer]’s FollowMyHealth portal, you will also be able to view your health information using other applications (apps) compatible with our system.

## 2022-05-06 NOTE — ED PROVIDER NOTE - NSICDXPASTMEDICALHX_GEN_ALL_CORE_FT
PAST MEDICAL HISTORY:  Anxiety     HLD (hyperlipidemia)     HTN (hypertension)     Macular degeneration     Osteoarthritis

## 2022-05-06 NOTE — ED ADULT NURSE NOTE - OBJECTIVE STATEMENT
Patient A/Ox4 with a steady gait. Daughter at bedside. Patient comes in from PCP for low HgB. Denies pain, dizziness or known source of bleeding. Also denies AC therapy. Patient does admit to generalized weakness and feeling tired "for months". NAD noted. IV and labs done. Call light in reach. Will continue to monitor.

## 2022-05-06 NOTE — ED PROVIDER NOTE - NSFOLLOWUPINSTRUCTIONS_ED_ALL_ED_FT
Follow up with pcp and hematology  return to er for any worsening symptoms     Anemia       Anemia is a condition in which there is not enough red blood cells or hemoglobin in the blood. Hemoglobin is a substance in red blood cells that carries oxygen.    When you do not have enough red blood cells or hemoglobin (are anemic), your body cannot get enough oxygen and your organs may not work properly. As a result, you may feel very tired or have other problems.      What are the causes?    Common causes of anemia include:  •Excessive bleeding. Anemia can be caused by excessive bleeding inside or outside the body, including bleeding from the intestines or from heavy menstrual periods in females.      •Poor nutrition.      •Long-lasting (chronic) kidney, thyroid, and liver disease.      •Bone marrow disorders, spleen problems, and blood disorders.      •Cancer and treatments for cancer.      •HIV (human immunodeficiency virus) and AIDS (acquired immunodeficiency syndrome).      •Infections, medicines, and autoimmune disorders that destroy red blood cells.        What are the signs or symptoms?    Symptoms of this condition include:  •Minor weakness.      •Dizziness.      •Headache, or difficulties concentrating and sleeping.      •Heartbeats that feel irregular or faster than normal (palpitations).      •Shortness of breath, especially with exercise.      •Pale skin, lips, and nails, or cold hands and feet.      •Indigestion and nausea.      Symptoms may occur suddenly or develop slowly. If your anemia is mild, you may not have symptoms.      How is this diagnosed?    This condition is diagnosed based on blood tests, your medical history, and a physical exam. In some cases, a test may be needed in which cells are removed from the soft tissue inside of a bone and looked at under a microscope (bone marrow biopsy). Your health care provider may also check your stool (feces) for blood and may do additional testing to look for the cause of your bleeding.    Other tests may include:  •Imaging tests, such as a CT scan or MRI.      •A procedure to see inside your esophagus and stomach (endoscopy).      •A procedure to see inside your colon and rectum (colonoscopy).        How is this treated?    Treatment for this condition depends on the cause. If you continue to lose a lot of blood, you may need to be treated at a hospital. Treatment may include:  •Taking supplements of iron, vitamin B12, or folic acid.      •Taking a hormone medicine (erythropoietin) that can help to stimulate red blood cell growth.      •Having a blood transfusion. This may be needed if you lose a lot of blood.      •Making changes to your diet.      •Having surgery to remove your spleen.        Follow these instructions at home:    •Take over-the-counter and prescription medicines only as told by your health care provider.      •Take supplements only as told by your health care provider.      •Follow any diet instructions that you were given by your health care provider.      •Keep all follow-up visits as told by your health care provider. This is important.        Contact a health care provider if:    •You develop new bleeding anywhere in the body.        Get help right away if:    •You are very weak.      •You are short of breath.      •You have pain in your abdomen or chest.      •You are dizzy or feel faint.      •You have trouble concentrating.      •You have bloody stools, black stools, or tarry stools.      •You vomit repeatedly or you vomit up blood.      These symptoms may represent a serious problem that is an emergency. Do not wait to see if the symptoms will go away. Get medical help right away. Call your local emergency services (911 in the [place] Do not drive yourself to the hospital.       Summary    •Anemia is a condition in which you do not have enough red blood cells or enough of a substance in your red blood cells that carries oxygen (hemoglobin).      •Symptoms may occur suddenly or develop slowly.      •If your anemia is mild, you may not have symptoms.      •This condition is diagnosed with blood tests, a medical history, and a physical exam. Other tests may be needed.      •Treatment for this condition depends on the cause of the anemia.      This information is not intended to replace advice given to you by your health care provider. Make sure you discuss any questions you have with your health care provider.      Document Revised: 11/24/2020 Document Reviewed: 11/24/2020    [employer] Patient Education © 2022 [employer]

## 2022-07-19 ENCOUNTER — EMERGENCY (EMERGENCY)
Facility: HOSPITAL | Age: 87
LOS: 1 days | Discharge: HOME | End: 2022-07-19
Attending: EMERGENCY MEDICINE | Admitting: EMERGENCY MEDICINE
Payer: MEDICARE

## 2022-07-19 VITALS
DIASTOLIC BLOOD PRESSURE: 57 MMHG | HEART RATE: 69 BPM | RESPIRATION RATE: 17 BRPM | SYSTOLIC BLOOD PRESSURE: 153 MMHG | TEMPERATURE: 99 F | OXYGEN SATURATION: 98 %

## 2022-07-19 VITALS
RESPIRATION RATE: 16 BRPM | OXYGEN SATURATION: 98 % | TEMPERATURE: 99 F | HEIGHT: 62 IN | HEART RATE: 69 BPM | SYSTOLIC BLOOD PRESSURE: 124 MMHG | DIASTOLIC BLOOD PRESSURE: 59 MMHG | WEIGHT: 139.99 LBS

## 2022-07-19 LAB
ALBUMIN SERPL ELPH-MCNC: 3.8 G/DL — SIGNIFICANT CHANGE UP (ref 3.3–5)
ALP SERPL-CCNC: 74 U/L — SIGNIFICANT CHANGE UP (ref 40–120)
ALT FLD-CCNC: 27 U/L — SIGNIFICANT CHANGE UP (ref 12–78)
ANION GAP SERPL CALC-SCNC: 7 MMOL/L — SIGNIFICANT CHANGE UP (ref 5–17)
APTT BLD: 27.3 SEC — LOW (ref 27.5–35.5)
AST SERPL-CCNC: 18 U/L — SIGNIFICANT CHANGE UP (ref 15–37)
BASOPHILS # BLD AUTO: 0 K/UL — SIGNIFICANT CHANGE UP (ref 0–0.2)
BASOPHILS NFR BLD AUTO: 0 % — SIGNIFICANT CHANGE UP (ref 0–2)
BILIRUB SERPL-MCNC: 0.5 MG/DL — SIGNIFICANT CHANGE UP (ref 0.2–1.2)
BUN SERPL-MCNC: 18 MG/DL — SIGNIFICANT CHANGE UP (ref 7–23)
CALCIUM SERPL-MCNC: 9.5 MG/DL — SIGNIFICANT CHANGE UP (ref 8.5–10.1)
CHLORIDE SERPL-SCNC: 106 MMOL/L — SIGNIFICANT CHANGE UP (ref 96–108)
CO2 SERPL-SCNC: 24 MMOL/L — SIGNIFICANT CHANGE UP (ref 22–31)
CREAT SERPL-MCNC: 0.66 MG/DL — SIGNIFICANT CHANGE UP (ref 0.5–1.3)
EGFR: 84 ML/MIN/1.73M2 — SIGNIFICANT CHANGE UP
EOSINOPHIL # BLD AUTO: 0 K/UL — SIGNIFICANT CHANGE UP (ref 0–0.5)
EOSINOPHIL NFR BLD AUTO: 0 % — SIGNIFICANT CHANGE UP (ref 0–6)
GLUCOSE SERPL-MCNC: 112 MG/DL — HIGH (ref 70–99)
HCT VFR BLD CALC: 22.8 % — LOW (ref 34.5–45)
HGB BLD-MCNC: 7.5 G/DL — LOW (ref 11.5–15.5)
INR BLD: 1.16 RATIO — SIGNIFICANT CHANGE UP (ref 0.88–1.16)
LYMPHOCYTES # BLD AUTO: 0.96 K/UL — LOW (ref 1–3.3)
LYMPHOCYTES # BLD AUTO: 26 % — SIGNIFICANT CHANGE UP (ref 13–44)
MCHC RBC-ENTMCNC: 32.9 GM/DL — SIGNIFICANT CHANGE UP (ref 32–36)
MCHC RBC-ENTMCNC: 35 PG — HIGH (ref 27–34)
MCV RBC AUTO: 106.5 FL — HIGH (ref 80–100)
MONOCYTES # BLD AUTO: 0.04 K/UL — SIGNIFICANT CHANGE UP (ref 0–0.9)
MONOCYTES NFR BLD AUTO: 1 % — LOW (ref 2–14)
NEUTROPHILS # BLD AUTO: 2.71 K/UL — SIGNIFICANT CHANGE UP (ref 1.8–7.4)
NEUTROPHILS NFR BLD AUTO: 72 % — SIGNIFICANT CHANGE UP (ref 43–77)
NRBC # BLD: SIGNIFICANT CHANGE UP /100 WBCS (ref 0–0)
PLATELET # BLD AUTO: 214 K/UL — SIGNIFICANT CHANGE UP (ref 150–400)
POTASSIUM SERPL-MCNC: 4.6 MMOL/L — SIGNIFICANT CHANGE UP (ref 3.5–5.3)
POTASSIUM SERPL-SCNC: 4.6 MMOL/L — SIGNIFICANT CHANGE UP (ref 3.5–5.3)
PROT SERPL-MCNC: 7.3 G/DL — SIGNIFICANT CHANGE UP (ref 6–8.3)
PROTHROM AB SERPL-ACNC: 13.6 SEC — HIGH (ref 10.5–13.4)
RBC # BLD: 2.14 M/UL — LOW (ref 3.8–5.2)
RBC # FLD: 20.2 % — HIGH (ref 10.3–14.5)
SODIUM SERPL-SCNC: 137 MMOL/L — SIGNIFICANT CHANGE UP (ref 135–145)
TROPONIN I, HIGH SENSITIVITY RESULT: 10.3 NG/L — SIGNIFICANT CHANGE UP
WBC # BLD: 3.71 K/UL — LOW (ref 3.8–10.5)
WBC # FLD AUTO: 3.71 K/UL — LOW (ref 3.8–10.5)

## 2022-07-19 PROCEDURE — 36415 COLL VENOUS BLD VENIPUNCTURE: CPT

## 2022-07-19 PROCEDURE — P9016: CPT

## 2022-07-19 PROCEDURE — 86850 RBC ANTIBODY SCREEN: CPT

## 2022-07-19 PROCEDURE — 86901 BLOOD TYPING SEROLOGIC RH(D): CPT

## 2022-07-19 PROCEDURE — 93010 ELECTROCARDIOGRAM REPORT: CPT

## 2022-07-19 PROCEDURE — 86900 BLOOD TYPING SEROLOGIC ABO: CPT

## 2022-07-19 PROCEDURE — 85610 PROTHROMBIN TIME: CPT

## 2022-07-19 PROCEDURE — 99285 EMERGENCY DEPT VISIT HI MDM: CPT | Mod: 25

## 2022-07-19 PROCEDURE — 71045 X-RAY EXAM CHEST 1 VIEW: CPT | Mod: 26

## 2022-07-19 PROCEDURE — 36430 TRANSFUSION BLD/BLD COMPNT: CPT

## 2022-07-19 PROCEDURE — 80053 COMPREHEN METABOLIC PANEL: CPT

## 2022-07-19 PROCEDURE — 99285 EMERGENCY DEPT VISIT HI MDM: CPT

## 2022-07-19 PROCEDURE — 93005 ELECTROCARDIOGRAM TRACING: CPT

## 2022-07-19 PROCEDURE — 71045 X-RAY EXAM CHEST 1 VIEW: CPT

## 2022-07-19 PROCEDURE — 86923 COMPATIBILITY TEST ELECTRIC: CPT

## 2022-07-19 PROCEDURE — 84484 ASSAY OF TROPONIN QUANT: CPT

## 2022-07-19 PROCEDURE — 85025 COMPLETE CBC W/AUTO DIFF WBC: CPT

## 2022-07-19 PROCEDURE — 85730 THROMBOPLASTIN TIME PARTIAL: CPT

## 2022-07-19 NOTE — ED PROVIDER NOTE - CARE PROVIDER_API CALL
Lisa Ponce)  Medical Oncology  [address] [facility], [address] [place] [address]  Phone: [phone]  Fax: [phone]  Follow Up Time:

## 2022-07-19 NOTE — ED ADULT NURSE NOTE - NSICDXPASTMEDICALHX_GEN_ALL_CORE_FT
PAST MEDICAL HISTORY:  Anemia     Anxiety     HLD (hyperlipidemia)     HTN (hypertension)     Macular degeneration     Osteoarthritis

## 2022-07-19 NOTE — ED PROVIDER NOTE - CONSTITUTIONAL, MLM
Patient presented with his mother after she stated that his behavior had become concerning. In meeting with this writer the mother expressed that her son needs a refill on his Zyprexa prescription last filled on 4/16 for 21 pills. She states that she was unable to get a sooner appointment at [facility] before 6/14. She reports that they have a therapist at [facility]. She does not feel the patient needs inpatient hospitalization but that the medication he was using made him more manageable. [facility] confirmed that the mother and son were in the intensive in-home program and that the mother had an appointment for 6/14 with the psychiatrist. The crisis worker, Lorin Prado stated that the ED provider could prescribe 10 days of medication should she feel comfortable doing so. The  was going to be made aware of the ED visit. Dr Rufina Emery was consulted on the case and he states that the patient does not meet inpatient psychiatric criteria or TDO criteria either. The patient will be discharged home.     Cata Fernandez, [facility], Resident in Counseling normal... Well appearing, awake, alert, oriented to person, place, time/situation and in no apparent distress.

## 2022-07-19 NOTE — ED ADULT TRIAGE NOTE - INTERNATIONAL TRAVEL
Heike Hudson, PTA.   [phone]   2019     No

## 2022-07-19 NOTE — ED ADULT NURSE NOTE - OBJECTIVE STATEMENT
patient comes to ED for evaluation of feeling weak and dizzy patient comes to ED for evaluation of feeling weakness pt has hx of anemia as per daughter at bedside her last hemoglobin done one week ago was 8 requiring no intervention

## 2022-07-19 NOTE — ED PROVIDER NOTE - PATIENT PORTAL LINK FT
You can access the FollowMyHealth Patient Portal offered by [facility] by registering at the following website: [email or web address]. By joining [employer]’s FollowMyHealth portal, you will also be able to view your health information using other applications (apps) compatible with our system.

## 2022-07-19 NOTE — ED ADULT NURSE NOTE - NSIMPLEMENTINTERV_GEN_ALL_ED
Implemented All Fall Risk Interventions:  [place] to call system. Call bell, personal items and telephone within reach. Instruct patient to call for assistance. Room bathroom lighting operational. Non-slip footwear when patient is off stretcher. Physically safe environment: no spills, clutter or unnecessary equipment. Stretcher in lowest position, wheels locked, appropriate side rails in place. Provide visual cue, wrist band, yellow gown, etc. Monitor gait and stability. Monitor for mental status changes and reorient to person, place, and time. Review medications for side effects contributing to fall risk. Reinforce activity limits and safety measures with patient and family.

## 2022-07-19 NOTE — ED PROVIDER NOTE - OBJECTIVE STATEMENT
89 F hx anemia c/o lightheadedness x today a/w SOB. Concerned it is due to worsening anemia. Had transfusion last month, received 2 units, scheduled for additional transfusion August 5. Last week hgb 8. Was told anemia likely due to bone marrow issue. Denies bleeding, melena.    pmd iliana hernandez

## 2022-07-19 NOTE — ED PROVIDER NOTE - NSFOLLOWUPINSTRUCTIONS_ED_ALL_ED_FT
Call Dr. Ponce tomorrow to discuss emergency department visit.  Return for worsening or concerning symptoms.    Anemia is a low number of red blood cells or a low amount of hemoglobin in your red blood cells. Hemoglobin is a protein that helps carry oxygen throughout your body. Red blood cells use iron to create hemoglobin. Anemia may develop if your body does not have enough iron. It may also develop if your body does not make enough red blood cells or they die faster than your body can make them.    DISCHARGE INSTRUCTIONS:    Call your local emergency number (911 in the [place]), or have someone call if:   •You lose consciousness.      •You have severe chest pain.      Return to the emergency department if:   •You have dark or bloody bowel movements.          Call your doctor if:   •Your symptoms are worse, even after treatment.      •You have questions or concerns about your condition or care.      Medicines:   •Iron or folic acid supplements help increase your red blood cell and hemoglobin levels.      •Vitamin B12 injections may help boost your red blood cell level and decrease your symptoms. Ask your healthcare provider how to inject B12.      •Take your medicine as directed. Contact your healthcare provider if you think your medicine is not helping or if you have side effects. Tell him or her if you are allergic to any medicine. Keep a list of the medicines, vitamins, and herbs you take. Include the amounts, and when and why you take them. Bring the list or the pill bottles to follow-up visits. Carry your medicine list with you in case of an emergency.      Prevent anemia: Eat healthy foods rich in iron and vitamin C. Nuts, meat, dark leafy green vegetables, and beans are high in iron and protein. Vitamin C helps your body absorb iron. Foods rich in vitamin C include oranges and other citrus fruits. Ask your healthcare provider for a list of other foods that are high in iron or vitamin C. Ask if you need to be on a special diet.  Sources of Iron       Sources of Vitamin C         Follow up with your doctor as directed: Write down your questions so you remember to ask them during your visits.

## 2022-07-19 NOTE — ED PROVIDER NOTE - PROGRESS NOTE DETAILS
dw dr lujan - since symptomatic agrees with plan for giving 1 unit of blood - states last week 6/11 pt's hgb 8.0 - if feeling better after 1 unit okay to d/c and f/u with dr hernandez, otherwise admission Reevaluated patient at bedside.  Patient feeling well, states feeling much better. Ambulating without issue. Discussed the results of all diagnostic testing in ED and copies of all available reports given.   An opportunity to ask questions was provided.  Discussed the importance of prompt, close medical follow-up.  Patient will return with any changes, concerns or persistent/worsening symptoms.  Understanding of all instructions verbalized.

## 2022-07-19 NOTE — ED PROVIDER NOTE - CLINICAL SUMMARY MEDICAL DECISION MAKING FREE TEXT BOX
DT: I have personally performed a face to face diagnostic evaluation on this patient.  I have reviewed the PA's note and agree with the history, exam, and plan of care, except as noted.  History and Exam by me shows "rushing of blood to extremities"  Pain to arms and legs. sob this morning on exertion. No cp, fever, cough.    .  Patient is NAD.  A n O x 3. Head NC/AT. Lungs cta bl. Heart s1,s2, rrr, no murmurs. Abd-soft, nt, no guarding, no rebound, no distension, no cva tenderness. Ext- FROM actively,  ambulating s any difficulty.  Labs and ct  and cxr were unremarkable.  Pt had 2 units prbc June 18th.

## 2022-11-17 NOTE — ED ADULT TRIAGE NOTE - BSA (M2)
[No Acute Distress] : no acute distress [Normal Oropharynx] : normal oropharynx [Normal Appearance] : normal appearance [No Neck Mass] : no neck mass [Normal Rate/Rhythm] : normal rate/rhythm [Normal S1, S2] : normal s1, s2 [No Murmurs] : no murmurs [No Resp Distress] : no resp distress [Clear to Auscultation Bilaterally] : clear to auscultation bilaterally [No Abnormalities] : no abnormalities [Benign] : benign [Normal Gait] : normal gait [No Clubbing] : no clubbing [No Cyanosis] : no cyanosis [No Edema] : no edema [FROM] : FROM 1.64 [Normal Color/ Pigmentation] : normal color/ pigmentation [No Focal Deficits] : no focal deficits [Oriented x3] : oriented x3 [Normal Affect] : normal affect

## 2023-01-27 ENCOUNTER — EMERGENCY (EMERGENCY)
Facility: HOSPITAL | Age: 88
LOS: 1 days | Discharge: HOME | End: 2023-01-27
Attending: EMERGENCY MEDICINE | Admitting: EMERGENCY MEDICINE
Payer: MEDICARE

## 2023-01-27 VITALS
WEIGHT: 138.01 LBS | SYSTOLIC BLOOD PRESSURE: 130 MMHG | RESPIRATION RATE: 18 BRPM | TEMPERATURE: 98 F | OXYGEN SATURATION: 97 % | DIASTOLIC BLOOD PRESSURE: 58 MMHG | HEIGHT: 60 IN | HEART RATE: 86 BPM

## 2023-01-27 PROBLEM — D64.9 ANEMIA, UNSPECIFIED: Chronic | Status: ACTIVE | Noted: 2022-07-19

## 2023-01-27 LAB
ANION GAP SERPL CALC-SCNC: 3 MMOL/L — LOW (ref 5–17)
BUN SERPL-MCNC: 22 MG/DL — SIGNIFICANT CHANGE UP (ref 7–23)
CALCIUM SERPL-MCNC: 9 MG/DL — SIGNIFICANT CHANGE UP (ref 8.5–10.1)
CHLORIDE SERPL-SCNC: 105 MMOL/L — SIGNIFICANT CHANGE UP (ref 96–108)
CO2 SERPL-SCNC: 26 MMOL/L — SIGNIFICANT CHANGE UP (ref 22–31)
CREAT SERPL-MCNC: 0.73 MG/DL — SIGNIFICANT CHANGE UP (ref 0.5–1.3)
EGFR: 79 ML/MIN/1.73M2 — SIGNIFICANT CHANGE UP
GLUCOSE SERPL-MCNC: 95 MG/DL — SIGNIFICANT CHANGE UP (ref 70–99)
HCT VFR BLD CALC: 19.5 % — CRITICAL LOW (ref 34.5–45)
HGB BLD-MCNC: 6.4 G/DL — CRITICAL LOW (ref 11.5–15.5)
MCHC RBC-ENTMCNC: 30 PG — SIGNIFICANT CHANGE UP (ref 27–34)
MCHC RBC-ENTMCNC: 32.8 GM/DL — SIGNIFICANT CHANGE UP (ref 32–36)
MCV RBC AUTO: 91.5 FL — SIGNIFICANT CHANGE UP (ref 80–100)
NRBC # BLD: 0 /100 WBCS — SIGNIFICANT CHANGE UP (ref 0–0)
PLATELET # BLD AUTO: 68 K/UL — LOW (ref 150–400)
POTASSIUM SERPL-MCNC: 4.2 MMOL/L — SIGNIFICANT CHANGE UP (ref 3.5–5.3)
POTASSIUM SERPL-SCNC: 4.2 MMOL/L — SIGNIFICANT CHANGE UP (ref 3.5–5.3)
RBC # BLD: 2.13 M/UL — LOW (ref 3.8–5.2)
RBC # FLD: 14.8 % — HIGH (ref 10.3–14.5)
SODIUM SERPL-SCNC: 134 MMOL/L — LOW (ref 135–145)
WBC # BLD: 13.21 K/UL — HIGH (ref 3.8–10.5)
WBC # FLD AUTO: 13.21 K/UL — HIGH (ref 3.8–10.5)

## 2023-01-27 PROCEDURE — P9016: CPT

## 2023-01-27 PROCEDURE — 99284 EMERGENCY DEPT VISIT MOD MDM: CPT

## 2023-01-27 PROCEDURE — 36430 TRANSFUSION BLD/BLD COMPNT: CPT

## 2023-01-27 PROCEDURE — 99285 EMERGENCY DEPT VISIT HI MDM: CPT | Mod: 25

## 2023-01-27 PROCEDURE — 80048 BASIC METABOLIC PNL TOTAL CA: CPT

## 2023-01-27 PROCEDURE — 86900 BLOOD TYPING SEROLOGIC ABO: CPT

## 2023-01-27 PROCEDURE — 36415 COLL VENOUS BLD VENIPUNCTURE: CPT

## 2023-01-27 PROCEDURE — 85027 COMPLETE CBC AUTOMATED: CPT

## 2023-01-27 PROCEDURE — 86923 COMPATIBILITY TEST ELECTRIC: CPT

## 2023-01-27 PROCEDURE — 86850 RBC ANTIBODY SCREEN: CPT

## 2023-01-27 PROCEDURE — 86901 BLOOD TYPING SEROLOGIC RH(D): CPT

## 2023-01-27 NOTE — ED PROVIDER NOTE - PATIENT PORTAL LINK FT
You can access the FollowMyHealth Patient Portal offered by [facility] by registering at the following website: [email or web address]. By joining [employer]’s FollowMyHealth portal, you will also be able to view your health information using other applications (apps) compatible with our system.

## 2023-01-27 NOTE — ED PROVIDER NOTE - CONSTITUTIONAL, MLM
Weak and pale appearing elderly white female, awake, alert, oriented to person, place, time/situation and in no apparent distress. normal...

## 2023-01-27 NOTE — ED PROVIDER NOTE - CLINICAL SUMMARY MEDICAL DECISION MAKING FREE TEXT BOX
Worsening weakness and fatigue secondary to worsening anemia found to have a hemoglobin of 6.7 earlier today with her primary hematologist sent here for evaluation as well as IV fluids and supplemental packed red blood cells via 2 unit transfusion.

## 2023-01-27 NOTE — ED PROVIDER NOTE - DIFFERENTIAL DIAGNOSIS
Differential diagnosis of weakness and fatigue in this individual include worsening anemia, occult infection doubt, electrolyte abnormalities doubt, dehydration doubt. Differential Diagnosis

## 2023-01-27 NOTE — ED ADULT NURSE NOTE - NS ED NURSE LEVEL OF CONSCIOUSNESS SPEECH
"[facility]  Pulmonary Medicine  Visit Clinic Note  March 3, 2022    Dear patient. Thank you for visiting with me. I want you to feel respected, understood, and empowered. \"Respect\" is valuing you as much as I would a close family member. \"Empowerment\" happens when you are fully informed, and can make the best possible decision for you.  Please ask me questions!  Challenge anything that is not clear.       ASSESSMENT & PLAN     Patient is a 77 year old male who has been referred to pulmonary clinic for further management of his emphysema.     #Emphysema secondary to smoking GOLD class A  #Dyspnea on exertion  #Renal Cyst  - Patient has lung disease secondary to prior smoking. His symptoms are moderately controlled. I will start him on ANoro.   - I have also rpescribed albuterol prn as well as nebulizer prn.  -I also have Azithromycin and Prednisone as a back up when he needs it .-I have prescribed pulmonary rehab for his COPD which he will do when he comes back  -I had a face to face conversation for his Nebulizer. He will need duoneb every 6 hours as needed.   - He has some cough and possible secretions. We can dsicuss further management as well as ent referral if needed in future.     Nebulizer shipping address:   #  [address], [place], [place], [address] Nebulizer     #Renal cyst:   -I have sent a message to PCP to follow up on renal cyst.     #Dyspnea on exertion;   -His Copd could be contributing to it. Will treat as above. Also recommended to follow with cardiology as well.  Normal ECHo was noted.  Negative nuclear scan.     RTC in 3 months    64 minutes spent on the date of the encounter doing chart review, history and exam, documentation and further activities as noted above.    These conclusions are made at the best of one's knowledge and belief based on the provided evidence such as patient's history and allergy test results and they can change over time or can be incomplete " because of missing information's.    I explained the lab values, imagings and findings to the patient.  Patient expressed understanding I did not recognize any barriers to the understanding of the patient.    The above note was dictated using voice recognition software and may include typographical errors. Please contact the author for any clarifications.    Darryl Santoro MD   RN Coordinators: Ellen/Pito/Chioma: [phone]  Clinic Number: [phone]         Today's visit note:     Chief Complaint: Amari Eddy is a 77 year old year old male who is being seen for Consult (New Emphysema )      HPI:   Patient has been referred to pulmonary clinic for further management of his emphysema.     -He had an episode of bradycardia and fatigue and was admitted to [facility].  His Ct chest for PE at the time also showed an emphysema and he was referred to pulmonary clinic here.     Patient  Had been very fuction until 8 years ago.  As of now play golf 3/4 times a week. For past few months he feels that energy level is not as good as it should be.   -  He has hacking cough which  Feels like there are secretions in the airways.   - It also wakes him up at night. Has been wheezing. He has not used albuterol yet.  He feels that he has increased dyspnea with exertion as well. Able to do his daily activities but his stamina has definitely decreased over the years.     Social History:   -3 [employer] for 30 years. Total 75 pack years. Stopped smoking in 1990.   -Has couple of drink every day.          Medications:     Current Outpatient Medications   Medication     amLODIPine (NORVASC) 10 MG tablet     atorvastatin (LIPITOR) 40 MG tablet     Calcium Malate POWD     FLUoxetine (PROZAC) 20 MG capsule     Loratadine (CLARITIN PO)     losartan (COZAAR) 100 MG tablet     Methylsulfonylmethane (MSM) 1000 MG CAPS     tamsulosin (FLOMAX) 0.4 MG capsule     zaleplon (SONATA) 5 MG capsule     No current facility-administered medications  "for this visit.            Review of Systems:       A complete 10 point review of systems was otherwise negative except as noted in the HPI.        PHYSICAL EXAM:  Ht 1.695 m (5' 6.75\")   Wt 77.1 kg (170 lb)   BMI 26.83 kg/m       General: Well developed, well nourished, No apparent distress  Eyes: Anicteric  Nose: Nasal mucosa with no edema or hyperemia.  No polyps  Ears: Hearing grossly normal  Mouth: Oral mucosa is moist, without any lesions. No oropharyngeal exudate.  Respiratory: Good air movement. No crackles. No rhonchi. No wheezes  Cardiac: RRR, normal S1, S2. No murmurs. No JVD  Abdomen: Soft, NT/ND  Musculoskeletal: Extremities normal. No clubbing. No cyanosis. No edema.  Skin: No rash on limited exam  Neuro: Normal mentation. Normal speech.  Psych:Normal affect           Data:   All laboratory and imaging data reviewed.      PFT:       PFT Interpretation:  I personally reviewed and interpreted the PFTs.   -Patient has moderate obstructive obstructive lung disease with normal diffusio ncapacity.       Chest CT: I personally reviewed and interpreted the CT scan.  -Emphysema is noted. No pulmonary nodules noted.     Recent Results (from the past 168 hour(s))   General PFT Lab (Please always keep checked)    Collection Time: 03/03/22  9:55 AM   Result Value Ref Range    FVC-Pred 3.56 L    FVC-Pre 2.71 L    FVC-%Pred-Pre 76 %    FEV1-Pre 1.79 L    FEV1-%Pred-Pre 66 %    FEV1FVC-Pred 76 %    FEV1FVC-Pre 66 %    FEFMax-Pred 6.88 L/sec    FEFMax-Pre 4.52 L/sec    FEFMax-%Pred-Pre 65 %    FEF2575-Pred 1.98 L/sec    FEF2575-Pre 1.19 L/sec    WZF6282-%Pred-Pre 60 %    ExpTime-Pre 6.70 sec    FIFMax-Pre 5.68 L/sec    VC-Pred 4.03 L    VC-Pre 2.70 L    VC-%Pred-Pre 66 %    IC-Pred 3.26 L    IC-Pre 2.17 L    IC-%Pred-Pre 66 %    ERV-Pred 0.77 L    ERV-Pre 0.52 L    ERV-%Pred-Pre 68 %    FEV1FEV6-Pred 77 %    FEV1FEV6-Pre 67 %    FRCPleth-Pred 3.57 L    FRCPleth-Pre 4.46 L    FRCPleth-%Pred-Pre 124 %    RVPleth-Pred " 2.69 L    RVPleth-Pre 3.94 L    RVPleth-%Pred-Pre 146 %    TLCPleth-Pred 6.47 L    TLCPleth-Pre 6.63 L    TLCPleth-%Pred-Pre 102 %    DLCOunc-Pred 22.42 ml/min/mmHg    DLCOunc-Pre 18.99 ml/min/mmHg    DLCOunc-%Pred-Pre 84 %    VA-Pre 4.31 L    VA-%Pred-Pre 76 %    FEV1SVC-Pred 66 %    FEV1SVC-Pre 66 %                                        Speaking Coherently

## 2023-01-27 NOTE — ED PROVIDER NOTE - ADDITIONAL HISTORY OBTAINED FROM [facility]
4/2018 Ortho at [facility]    Assessment   Colleen is a 7 y.o. female who presents today with concern for femoral anteversion. There is only mild asymmetry and slight increase in femoral anteversion of the right hip. This does not currently warrant surgical discussion and may resolve with further growth given her skeletal immaturity. Given that she is currently asymptomatic Colleen's mother is relieved that no intervention is required.       Audiology Evaluation 8/18/21 demonstrated:  1) Her mild hearing loss seems to be stable as it has not changed since it was identified in 2020.   2) Her hearing should be checked again next year prior to the start of the school year.   3) She should continue to have preferential seating in the classroom.   4) Hearing protection in noise is recommended.       Family

## 2023-01-27 NOTE — ED ADULT NURSE NOTE - OBJECTIVE STATEMENT
pt is A&Ox4, pt presented to the ER for low hemoglobin, chronic anemia, pt denies any dizziness, headache, sob at this moment, resp even and unlabored, nad noted, will continue to monitor

## 2023-01-27 NOTE — ED ADULT TRIAGE NOTE - NS ED NURSE DIRECT TO ROOM YN
Faxed medical records from last 2 virtual visits with pulmonology referral to Virgilio Galeana at [facility] at [phone] - confirmation of receipt received  Sonido Martínez LPN  2/81/8681  01:74 PM
[phone]
Yes

## 2023-01-27 NOTE — ED PROVIDER NOTE - OBJECTIVE STATEMENT
89-year-old white female with history of hypertension hyperlipidemia osteoarthritis anemia with what sounds to be bone marrow failure last transfused packed red blood cells 3 weeks ago here now referred by her hematologist for transfusion of 2 units of packed red blood cells for symptomatic anemia of 6.7.  Patient states that she typically gets transfused packed red blood cells approximately every 3 to 4 weeks.  Over the past week or so patient's been noting increasing weakness fatigue and dyspnea.  No chest pain or headache or palpitations.

## 2023-01-27 NOTE — ED PROVIDER NOTE - NSFOLLOWUPINSTRUCTIONS_ED_ALL_ED_FT
Rest.  Follow-up with your primary care physician next week for reevaluation.  Return here if needed.        [employer]® CareNotes®     :  [facility]  	                     ANEMIA - AfterCare(R) Instructions(ER/ED)            Anemia    WHAT YOU NEED TO KNOW:    Anemia is a low number of red blood cells or a low amount of hemoglobin in your red blood cells. Hemoglobin is a protein that helps carry oxygen throughout your body. Red blood cells use iron to create hemoglobin. Anemia may develop if your body does not have enough iron. It may also develop if your body does not make enough red blood cells or they die faster than your body can make them.    DISCHARGE INSTRUCTIONS:    Call your local emergency number (911 in the [place]), or have someone call if:   •You lose consciousness.      •You have severe chest pain.      Return to the emergency department if:   •You have dark or bloody bowel movements.          Call your doctor if:   •Your symptoms are worse, even after treatment.      •You have questions or concerns about your condition or care.      Medicines:   •Iron or folic acid supplements help increase your red blood cell and hemoglobin levels.      •Vitamin B12 injections may help boost your red blood cell level and decrease your symptoms. Ask your healthcare provider how to inject B12.      •Take your medicine as directed. Contact your healthcare provider if you think your medicine is not helping or if you have side effects. Tell your provider if you are allergic to any medicine. Keep a list of the medicines, vitamins, and herbs you take. Include the amounts, and when and why you take them. Bring the list or the pill bottles to follow-up visits. Carry your medicine list with you in case of an emergency.      Prevent anemia: Eat healthy foods rich in iron and vitamin C. Nuts, meat, dark leafy green vegetables, and beans are high in iron and protein. Vitamin C helps your body absorb iron. Foods rich in vitamin C include oranges and other citrus fruits. Ask your healthcare provider for a list of other foods that are high in iron or vitamin C. Ask if you need to be on a special diet.  Sources of Iron       Sources of Vitamin C         Follow up with your doctor as directed: Write down your questions so you remember to ask them during your visits.       © Copyright [employer] 2023           back to top                          © Copyright [employer] 2023

## 2023-01-28 VITALS
HEART RATE: 85 BPM | DIASTOLIC BLOOD PRESSURE: 78 MMHG | RESPIRATION RATE: 18 BRPM | TEMPERATURE: 98 F | SYSTOLIC BLOOD PRESSURE: 164 MMHG | OXYGEN SATURATION: 98 %

## 2023-01-28 NOTE — ED ADULT NURSE REASSESSMENT NOTE - NS ED NURSE REASSESS COMMENT FT1
Patient was seen and treated in the ED, D/C instructions given to patient and daughter by Md, who verbalized understanding. Patient seen leaving aaox4, ambulatory, with belongings.

## 2023-02-15 ENCOUNTER — EMERGENCY (EMERGENCY)
Facility: HOSPITAL | Age: 88
LOS: 1 days | Discharge: HOME | End: 2023-02-15
Attending: EMERGENCY MEDICINE | Admitting: EMERGENCY MEDICINE
Payer: MEDICARE

## 2023-02-15 VITALS
SYSTOLIC BLOOD PRESSURE: 114 MMHG | RESPIRATION RATE: 18 BRPM | HEART RATE: 79 BPM | TEMPERATURE: 97 F | WEIGHT: 132.94 LBS | DIASTOLIC BLOOD PRESSURE: 48 MMHG | OXYGEN SATURATION: 99 % | HEIGHT: 60 IN

## 2023-02-15 LAB
ALBUMIN SERPL ELPH-MCNC: 3.1 G/DL — LOW (ref 3.3–5)
ALP SERPL-CCNC: 104 U/L — SIGNIFICANT CHANGE UP (ref 40–120)
ALT FLD-CCNC: 26 U/L — SIGNIFICANT CHANGE UP (ref 12–78)
ANION GAP SERPL CALC-SCNC: 4 MMOL/L — LOW (ref 5–17)
AST SERPL-CCNC: 25 U/L — SIGNIFICANT CHANGE UP (ref 15–37)
BASOPHILS # BLD AUTO: 0 K/UL — SIGNIFICANT CHANGE UP (ref 0–0.2)
BASOPHILS NFR BLD AUTO: 0 % — SIGNIFICANT CHANGE UP (ref 0–2)
BILIRUB SERPL-MCNC: 0.4 MG/DL — SIGNIFICANT CHANGE UP (ref 0.2–1.2)
BUN SERPL-MCNC: 21 MG/DL — SIGNIFICANT CHANGE UP (ref 7–23)
CALCIUM SERPL-MCNC: 9.3 MG/DL — SIGNIFICANT CHANGE UP (ref 8.5–10.1)
CHLORIDE SERPL-SCNC: 107 MMOL/L — SIGNIFICANT CHANGE UP (ref 96–108)
CO2 SERPL-SCNC: 25 MMOL/L — SIGNIFICANT CHANGE UP (ref 22–31)
CREAT SERPL-MCNC: 0.71 MG/DL — SIGNIFICANT CHANGE UP (ref 0.5–1.3)
EGFR: 81 ML/MIN/1.73M2 — SIGNIFICANT CHANGE UP
EOSINOPHIL # BLD AUTO: 0.12 K/UL — SIGNIFICANT CHANGE UP (ref 0–0.5)
EOSINOPHIL NFR BLD AUTO: 1 % — SIGNIFICANT CHANGE UP (ref 0–6)
GLUCOSE SERPL-MCNC: 106 MG/DL — HIGH (ref 70–99)
HCT VFR BLD CALC: 20.6 % — CRITICAL LOW (ref 34.5–45)
HGB BLD-MCNC: 6.6 G/DL — CRITICAL LOW (ref 11.5–15.5)
LYMPHOCYTES # BLD AUTO: 2.95 K/UL — SIGNIFICANT CHANGE UP (ref 1–3.3)
LYMPHOCYTES # BLD AUTO: 24 % — SIGNIFICANT CHANGE UP (ref 13–44)
MCHC RBC-ENTMCNC: 28.7 PG — SIGNIFICANT CHANGE UP (ref 27–34)
MCHC RBC-ENTMCNC: 32 GM/DL — SIGNIFICANT CHANGE UP (ref 32–36)
MCV RBC AUTO: 89.6 FL — SIGNIFICANT CHANGE UP (ref 80–100)
MONOCYTES # BLD AUTO: 0.86 K/UL — SIGNIFICANT CHANGE UP (ref 0–0.9)
MONOCYTES NFR BLD AUTO: 7 % — SIGNIFICANT CHANGE UP (ref 2–14)
NEUTROPHILS # BLD AUTO: 7.02 K/UL — SIGNIFICANT CHANGE UP (ref 1.8–7.4)
NEUTROPHILS NFR BLD AUTO: 56 % — SIGNIFICANT CHANGE UP (ref 43–77)
NRBC # BLD: SIGNIFICANT CHANGE UP /100 WBCS (ref 0–0)
PLATELET # BLD AUTO: 60 K/UL — LOW (ref 150–400)
POTASSIUM SERPL-MCNC: 4.4 MMOL/L — SIGNIFICANT CHANGE UP (ref 3.5–5.3)
POTASSIUM SERPL-SCNC: 4.4 MMOL/L — SIGNIFICANT CHANGE UP (ref 3.5–5.3)
PROT SERPL-MCNC: 7.4 G/DL — SIGNIFICANT CHANGE UP (ref 6–8.3)
RBC # BLD: 2.3 M/UL — LOW (ref 3.8–5.2)
RBC # FLD: 15.6 % — HIGH (ref 10.3–14.5)
SODIUM SERPL-SCNC: 136 MMOL/L — SIGNIFICANT CHANGE UP (ref 135–145)
WBC # BLD: 12.31 K/UL — HIGH (ref 3.8–10.5)
WBC # FLD AUTO: 12.31 K/UL — HIGH (ref 3.8–10.5)

## 2023-02-15 PROCEDURE — 99284 EMERGENCY DEPT VISIT MOD MDM: CPT

## 2023-02-15 PROCEDURE — 86077 PHYS BLOOD BANK SERV XMATCH: CPT

## 2023-02-15 NOTE — ED ADULT NURSE NOTE - IN THE PAST 12 MONTHS HAVE YOU USED DRUGS OTHER THAN THOSE REQUIRED FOR MEDICAL REASON?
General: Alert and oriented x 3, well-groomed  Heart: RRR  Neuro: Grossly intact PE reviewed no new signs/symptoms No

## 2023-02-15 NOTE — ED PROVIDER NOTE - CARE PROVIDER_API CALL
Pacheco Lira)  Hematology; Internal Medicine; Medical Oncology  [address], [place] [address]  Phone: [phone]  Fax: [phone]  Follow Up Time:

## 2023-02-15 NOTE — ED PROVIDER NOTE - PROGRESS NOTE DETAILS
Patient was transfused as per part protocol and as ordered.  Patient tolerated transfusion without any complications and now will be discharged.

## 2023-02-15 NOTE — ED ADULT NURSE NOTE - ABDOMEN
Medication History completed:    New medications: azithromycin, lidocaine viscous 2%    Medications discontinued: none    Changes to dosing: none    Stated allergies: As listed    Other pertinent information: Medications confirmed with [facility]. The patient started a 5 day course of azithromycin on 10/24/22.     Thank you,  Jennifer Camp, PharmD, BCPS  [phone] soft

## 2023-02-15 NOTE — ED ADULT NURSE NOTE - NURSING GU BLADDER
HPI Comments: 21month-old girl with a history of eczema presents for evaluation of fever, vomiting, diarrhea for 24 hours. Emesis nonbloody and nonbilious, occurred once. Diarrhea nonbloody, occurred twice. Normal urine output. Mild decrease in p.o. intake. No cough, URI symptoms, respiratory distress. No ill contacts. Up-to-date on immunizations. Family and social history unremarkable. Patient is a 21 m.o. female presenting with fever. Pediatric Social History:      Chief complaint is no cough, no congestion, diarrhea, vomiting and no eye redness. Associated symptoms include a fever, diarrhea, nausea and vomiting. Pertinent negatives include no constipation, no congestion, no rhinorrhea, no cough, no wheezing, no rash, no eye discharge and no eye redness. Past Medical History:   Diagnosis Date    Hernia     Ill-defined condition     Chronic constipation       History reviewed. No pertinent surgical history. Family History:   Problem Relation Age of Onset    Anemia Mother      Copied from mother's history at birth   [facility] Asthma Mother      Copied from mother's history at birth       Social History     Social History    Marital status: SINGLE     Spouse name: N/A    Number of children: N/A    Years of education: N/A     Occupational History    Not on file. Social History Main Topics    Smoking status: Never Smoker    Smokeless tobacco: Not on file    Alcohol use Not on file    Drug use: Not on file    Sexual activity: Not on file     Other Topics Concern    Not on file     Social History Narrative    ** Merged History Encounter **              ALLERGIES: Peanut    Review of Systems   Constitutional: Positive for fever. Negative for activity change and appetite change. HENT: Negative for congestion and rhinorrhea. Eyes: Negative for discharge and redness. Respiratory: Negative for cough and wheezing.     Cardiovascular: Negative for chest pain and cyanosis. Gastrointestinal: Positive for diarrhea, nausea and vomiting. Negative for constipation. Genitourinary: Negative for decreased urine volume and difficulty urinating. Skin: Negative for rash and wound. Hematological: Does not bruise/bleed easily. All other systems reviewed and are negative. Vitals:    11/17/17 0137   BP: 90/59   Pulse: 148   Resp: 30   Temp: (!) 101.4 °F (38.6 °C)   SpO2: 100%   Weight: 10.4 kg            Physical Exam   Constitutional: She appears well-developed and well-nourished. She is active. HENT:   Head: Atraumatic. Right Ear: Tympanic membrane normal.   Left Ear: Tympanic membrane normal.   Nose: Nose normal. No nasal discharge. Mouth/Throat: Mucous membranes are moist. No tonsillar exudate. Oropharynx is clear. Pharynx is normal.   Eyes: Conjunctivae and EOM are normal. Pupils are equal, round, and reactive to light. Right eye exhibits no discharge. Left eye exhibits no discharge. Neck: Normal range of motion. Neck supple. No adenopathy. Cardiovascular: Normal rate and regular rhythm. Exam reveals no S3, no S4 and no friction rub. Pulses are palpable. No murmur heard. Pulmonary/Chest: Effort normal and breath sounds normal. No stridor. No respiratory distress. She has no wheezes. She has no rhonchi. She has no rales. She exhibits no retraction. Abdominal: Soft. She exhibits no distension and no mass. Bowel sounds are increased. There is no hepatosplenomegaly. There is no tenderness. There is no rebound and no guarding. No hernia. Musculoskeletal: Normal range of motion. She exhibits no deformity or signs of injury. Neurological: She is alert. She has normal strength and normal reflexes. She exhibits normal muscle tone. Skin: Skin is warm and dry. Capillary refill takes less than 3 seconds. Rash (eczematous patches and plaques on flexor surfaces of elbows, knees, and scattered on trunk) noted. Nursing note and vitals reviewed.        [facility]  ED Course       Procedures    Pt was re-evaluated after zofran. Ddx includes early appendicitis, viral gastroenteritis, food poisoning, among others. The patient has tolerated PO without further emesis. Patient is well hydrated, well appearing, and in no respiratory distress. Physical exam is reassuring, and without signs of serious illness. Symptoms likely secondary to a viral syndrome. Will discharge patient home with supportive care, zofran, and follow-up with PCP within the next few days. non-distended

## 2023-02-15 NOTE — ED PROVIDER NOTE - OBJECTIVE STATEMENT
Obtained by PMD because of a hemoglobin count below 7.  Patient has a history of chronic anemia and due to what sounds like myelodysplastic syndrome.  Patient states that for the last year she has had blood transfusions every 3 weeks.

## 2023-02-15 NOTE — ED PROVIDER NOTE - PATIENT PORTAL LINK FT
You can access the FollowMyHealth Patient Portal offered by [facility] by registering at the following website: [email or web address]. By joining [employer]’s FollowMyHealth portal, you will also be able to view your health information using other applications (apps) compatible with our system.

## 2023-02-15 NOTE — ED ADULT TRIAGE NOTE - CHIEF COMPLAINT QUOTE
patient to triage a&ox4 sent by doctor for c/o abnormal lab result hgb 6.7 at MD - c/o weakness and SOB. o2 sat WNL.  sent to ED for 2 units blood. last blood infusion 2 wks ago

## 2023-02-15 NOTE — ED ADULT NURSE NOTE - OBJECTIVE STATEMENT
Pt received in bed alert and oriented and resting in bed with the c/o anemia. As per MD's orders IV med lock placed blood specimen obtained and sent to the lab.  Pt stable and nursing care ongoing and safety maintained.

## 2023-02-15 NOTE — ED ADULT NURSE REASSESSMENT NOTE - NS ED NURSE REASSESS COMMENT FT1
Pt received from DIONI Johnson. Pt is Aox4 and pale daughter at bedside. Pt denies feeling dizzy or lightheaded. Pt is waiting for PRBCs. VSS will reassess.
gradual onset

## 2023-02-16 VITALS
RESPIRATION RATE: 16 BRPM | OXYGEN SATURATION: 100 % | TEMPERATURE: 98 F | SYSTOLIC BLOOD PRESSURE: 184 MMHG | HEART RATE: 83 BPM | DIASTOLIC BLOOD PRESSURE: 80 MMHG

## 2023-02-16 PROCEDURE — P9016: CPT

## 2023-02-16 PROCEDURE — 86880 COOMBS TEST DIRECT: CPT

## 2023-02-16 PROCEDURE — 86902 BLOOD TYPE ANTIGEN DONOR EA: CPT

## 2023-02-16 PROCEDURE — 86922 COMPATIBILITY TEST ANTIGLOB: CPT

## 2023-02-16 PROCEDURE — 86900 BLOOD TYPING SEROLOGIC ABO: CPT

## 2023-02-16 PROCEDURE — 99285 EMERGENCY DEPT VISIT HI MDM: CPT | Mod: 25

## 2023-02-16 PROCEDURE — 86850 RBC ANTIBODY SCREEN: CPT

## 2023-02-16 PROCEDURE — 36430 TRANSFUSION BLD/BLD COMPNT: CPT

## 2023-02-16 PROCEDURE — 36415 COLL VENOUS BLD VENIPUNCTURE: CPT

## 2023-02-16 PROCEDURE — 86901 BLOOD TYPING SEROLOGIC RH(D): CPT

## 2023-02-16 PROCEDURE — 86870 RBC ANTIBODY IDENTIFICATION: CPT

## 2023-02-16 PROCEDURE — 85025 COMPLETE CBC W/AUTO DIFF WBC: CPT

## 2023-02-16 PROCEDURE — 86860 RBC ANTIBODY ELUTION: CPT

## 2023-02-16 PROCEDURE — 0001U RBC DNA HEA 35 AG 11 BLD GRP: CPT

## 2023-02-16 PROCEDURE — 80053 COMPREHEN METABOLIC PANEL: CPT

## 2023-02-16 RX ORDER — ASPIRIN/CALCIUM CARB/MAGNESIUM 324 MG
1 TABLET ORAL
Qty: 0 | Refills: 0 | DISCHARGE

## 2023-02-16 RX ORDER — LISINOPRIL 2.5 MG/1
1 TABLET ORAL
Qty: 0 | Refills: 0 | DISCHARGE

## 2023-02-16 RX ORDER — AMITRIPTYLINE HCL 25 MG
1 TABLET ORAL
Qty: 0 | Refills: 0 | DISCHARGE

## 2023-05-02 NOTE — ED ADULT NURSE NOTE - ISOLATION TYPE:
"[facility]  Gastroenterology    Patient evaluated at the request of Carlos Paul MD  [address] [facility] [place] [address]    PCP: Carlos Paul MD    5/2/23    HPI       Bloated     Additional comments: Pt present today with c/o bloating, diarrhea, vomiting, and lower lt abdomen pain             Last edited by Toni Milligan, MA on 5/2/2023  1:23 PM.        Reason for Visit: Diarrhea, Bloating, LLQ abdominal pain    Subjective:   Miles Bowen is a 62 y.o. female here with symptoms of LLQ abdominal pain and abdominal bloating worsening over 1 month. Patient also reports associated symptoms of diarrhea, nausea and vomiting. Last colonoscopy in 2015- sigmoid diverticulosis present. Patient visibly uncomfortable in clinic today due pain and abdominal distention. No fever but gets "clammy" feeling.     Past Medical History:   Diagnosis Date    Anxiety     Depression     GERD (gastroesophageal reflux disease)     Heart murmur     Hematemesis without nausea 11/4/2016    Hypertension     Multinodular goiter 12/23/2016    MVP (mitral valve prolapse)     Obesity        Past Surgical History:   Procedure Laterality Date    BACK SURGERY  2016    Bone fusion    COLONOSCOPY  2014    FIXATION KYPHOPLASTY THORACIC SPINE  05/2016    HYSTERECTOMY  2004    ZULMA,BSO for endometriosis    knee replacement Right 11/2020    NASAL SINUS SURGERY  2015    Polyps removed    SINUS SURGERY  11/2014    SPINE SURGERY  NA    TONSILLECTOMY         Current Outpatient Medications on File Prior to Visit   Medication Sig Dispense Refill    ACETAMINOPHEN (TYLENOL EXTRA STRENGTH ORAL) Take 2 tablets by mouth every 4 to 6 hours as needed.      baclofen (LIORESAL) 10 MG tablet Take 1 tablet (10 mg total) by mouth 2 (two) times daily as needed (muscle spasm). 30 tablet 0    diazePAM (VALIUM) 10 MG Tab every 12 (twelve) hours as needed.      dicyclomine (BENTYL) 10 MG capsule Take 1 capsule by mouth 4 times daily before meals " and nightly prn 40 capsule 0    diphenoxylate-atropine 2.5-0.025 mg (LOMOTIL) 2.5-0.025 mg per tablet Take 1 tablet by mouth 4 (four) times daily as needed for Diarrhea. 30 tablet 0    duloxetine (CYMBALTA) 60 MG capsule Take 1 capsule by mouth once daily.   0    estradioL (ESTRACE) 0.5 MG tablet Take 1 tablet (0.5 mg total) by mouth once daily. 30 tablet 11    hydroCHLOROthiazide (HYDRODIURIL) 12.5 MG Tab Take 1 tablet (12.5 mg total) by mouth once daily. 90 tablet 0    HYDROcodone-acetaminophen (NORCO) 5-325 mg per tablet Take 1 tablet by mouth once a day as needed for pain 30 tablet 0    HYDROcodone-acetaminophen (NORCO) 5-325 mg per tablet Take 1 tablet by mouth once a day as needed for pain 30 tablet 0    LORazepam (ATIVAN) 2 MG Tab SMARTSI Tablet(s) By Mouth Every 8-10 Hours PRN      losartan (COZAAR) 100 MG tablet Take 1 tablet (100 mg total) by mouth once daily. 90 tablet 3    pantoprazole (PROTONIX) 40 MG tablet Take 1 tablet (40 mg total) by mouth once daily. 90 tablet 0    promethazine (PHENERGAN) 25 MG tablet Take 1 tablet (25 mg total) by mouth every 8 (eight) hours as needed for Nausea. 30 tablet 0    rimegepant 75 mg odt Take 1 tablet (75 mg total) by mouth every other day. Place ODT tablet on or under the tongue. 16 tablet 5    topiramate (TOPAMAX) 25 MG tablet Take 1 tablet (25 mg total) by mouth every morning AND 2 tablets (50 mg total) every evening. 90 tablet 1    zolpidem (AMBIEN CR) 12.5 MG CR tablet Take 12.5 mg by mouth nightly.       No current facility-administered medications on file prior to visit.       Review of patient's allergies indicates:   Allergen Reactions    Aimovig autoinjector [erenumab-aooe] Other (See Comments)     Neurological problems      Amoxicillin      GI upset/ stomach pain    Benadryl allergy decongestant     Ibuprofen Rash     GI upset    Nsaids (non-steroidal anti-inflammatory drug) Rash     GI Upset    Prednisone Palpitations    Singulair [montelukast]  Palpitations    Tessalon perle [benzonatate] Palpitations       Social History     Socioeconomic History    Marital status:    Tobacco Use    Smoking status: Former     Packs/day: 0.25     Years: 1.00     Pack years: 0.25     Types: Cigarettes     Quit date: 2006     Years since quittin.3    Smokeless tobacco: Never   Substance and Sexual Activity    Alcohol use: Not Currently     Comment: rarely    Drug use: No    Sexual activity: Yes     Partners: Male     Birth control/protection: None, See Surgical Hx   Social History Narrative     with 1 adult child, no pets or smokers in household.     Social Determinants of Health     Financial Resource Strain: Unknown    Difficulty of Paying Living Expenses: Patient refused   Food Insecurity: Unknown    Worried About Running Out of Food in the Last Year: Patient refused    Ran Out of Food in the Last Year: Patient refused   Transportation Needs: Unknown    Lack of Transportation (Medical): Patient refused    Lack of Transportation (Non-Medical): Patient refused   Physical Activity: Unknown    Days of Exercise per Week: Patient refused   Stress: Unknown    Feeling of Stress : Patient refused   Social Connections: Unknown    Frequency of Communication with Friends and Family: Patient refused    Frequency of Social Gatherings with Friends and Family: Patient refused    Active Member of Clubs or Organizations: Patient refused    Attends Club or Organization Meetings: Patient refused    Marital Status: Patient refused   Housing Stability: Unknown    Unable to Pay for Housing in the Last Year: Patient refused    Unstable Housing in the Last Year: Patient refused       Family History   Problem Relation Age of Onset    Hypertension Mother     Hypertension Father     Heart attack Father 63    Breast cancer Maternal Grandmother     Colon cancer Neg Hx     Stomach cancer Neg Hx        Review of Systems   Constitutional:  Negative for appetite change, fever and  unexpected weight change.   HENT:  Negative for postnasal drip, rhinorrhea, sneezing, sore throat and trouble swallowing.    Eyes:  Negative for visual disturbance.   Respiratory:  Negative for cough, shortness of breath and wheezing.    Cardiovascular:  Negative for chest pain, palpitations and leg swelling.   Gastrointestinal:  Positive for abdominal distention, abdominal pain, diarrhea, nausea and vomiting. Negative for blood in stool and constipation.   Genitourinary:  Negative for dysuria.   Musculoskeletal:  Negative for arthralgias, joint swelling and myalgias.   Skin:  Negative for color change, pallor and rash.   Neurological:  Negative for weakness, light-headedness, numbness and headaches.   Hematological:  Negative for adenopathy. Does not bruise/bleed easily.   Psychiatric/Behavioral:  Negative for agitation.          Objective:   Vitals:   Vitals:    05/02/23 1324   BP: (!) 134/92   Pulse: 92       Physical Exam  Vitals reviewed.   Constitutional:       General: She is not in acute distress.     Appearance: She is not diaphoretic.   HENT:      Head: Normocephalic and atraumatic.      Mouth/Throat:      Pharynx: No oropharyngeal exudate.   Eyes:      General: No scleral icterus.        Right eye: No discharge.         Left eye: No discharge.      Conjunctiva/sclera: Conjunctivae normal.      Pupils: Pupils are equal, round, and reactive to light.   Cardiovascular:      Rate and Rhythm: Normal rate and regular rhythm.      Heart sounds: Normal heart sounds. No murmur heard.    No friction rub. No gallop.   Pulmonary:      Effort: Pulmonary effort is normal. No respiratory distress.      Breath sounds: Normal breath sounds. No stridor. No wheezing or rales.   Abdominal:      General: Bowel sounds are normal. There is no distension.      Palpations: Abdomen is soft. There is no mass.      Tenderness: There is no abdominal tenderness. There is no guarding.   Musculoskeletal:         General: Normal range of  motion.      Cervical back: Normal range of motion.   Skin:     General: Skin is warm and dry.      Coloration: Skin is not pale.      Findings: No erythema or rash.   Neurological:      Mental Status: She is alert and oriented to person, place, and time.         IMPRESSION     Problem List Items Addressed This Visit    None  Visit Diagnoses       Left lower quadrant pain    -  Primary    Relevant Orders    CT Abdomen Pelvis With Contrast    Diarrhea, unspecified type        History of diverticulosis        Abdominal bloating        Nausea and vomiting, unspecified vomiting type                PLANS:    - CT A/P stat to r/o diverticulitis  - Labs reviewed- WBC normal  - Last colonoscopy in 2015- sigmoid diverticulosis and internal hemorrhoids  - Further recs pending CT results    Left lower quadrant pain  -     CT Abdomen Pelvis With Contrast; Future; Expected date: 05/02/2023    Diarrhea, unspecified type  -     Ambulatory referral/consult to Gastroenterology    History of diverticulosis  -     Ambulatory referral/consult to Gastroenterology    Abdominal bloating    Nausea and vomiting, unspecified vomiting type      Kylie Samuel MD  Gastroenterology       None

## 2023-05-23 ENCOUNTER — TRANSCRIPTION ENCOUNTER (OUTPATIENT)
Age: 88
End: 2023-05-23

## 2023-05-23 ENCOUNTER — EMERGENCY (EMERGENCY)
Facility: HOSPITAL | Age: 88
LOS: 1 days | Discharge: ANOTHER HOSPITAL | End: 2023-05-23
Attending: EMERGENCY MEDICINE | Admitting: EMERGENCY MEDICINE
Payer: MEDICARE

## 2023-05-23 ENCOUNTER — INPATIENT (INPATIENT)
Facility: HOSPITAL | Age: 88
LOS: 15 days | Discharge: NURSING, REHAB OR LONG-TERM CARE | DRG: 963 | End: 2023-06-08
Attending: SURGERY | Admitting: SURGERY
Payer: MEDICARE

## 2023-05-23 VITALS
DIASTOLIC BLOOD PRESSURE: 52 MMHG | TEMPERATURE: 98 F | SYSTOLIC BLOOD PRESSURE: 94 MMHG | RESPIRATION RATE: 16 BRPM | HEIGHT: 61 IN | WEIGHT: 123.02 LBS | OXYGEN SATURATION: 96 % | HEART RATE: 68 BPM

## 2023-05-23 VITALS
DIASTOLIC BLOOD PRESSURE: 63 MMHG | RESPIRATION RATE: 20 BRPM | SYSTOLIC BLOOD PRESSURE: 143 MMHG | HEART RATE: 74 BPM | OXYGEN SATURATION: 100 % | HEIGHT: 61 IN | WEIGHT: 123.02 LBS

## 2023-05-23 VITALS
DIASTOLIC BLOOD PRESSURE: 66 MMHG | HEART RATE: 80 BPM | SYSTOLIC BLOOD PRESSURE: 144 MMHG | RESPIRATION RATE: 16 BRPM | TEMPERATURE: 99 F | OXYGEN SATURATION: 100 %

## 2023-05-23 DIAGNOSIS — S06.5XAA TRAUMATIC SUBDURAL HEMORRHAGE WITH LOSS OF CONSCIOUSNESS STATUS UNKNOWN, INITIAL ENCOUNTER: ICD-10-CM

## 2023-05-23 LAB
ALBUMIN SERPL ELPH-MCNC: 3.2 G/DL — LOW (ref 3.3–5)
ALP SERPL-CCNC: 105 U/L — SIGNIFICANT CHANGE UP (ref 30–120)
ALT FLD-CCNC: 39 U/L DA — SIGNIFICANT CHANGE UP (ref 10–60)
ANION GAP SERPL CALC-SCNC: 5 MMOL/L — SIGNIFICANT CHANGE UP (ref 5–17)
ANION GAP SERPL CALC-SCNC: 8 MMOL/L — SIGNIFICANT CHANGE UP (ref 5–17)
APTT BLD: 30.5 SEC — SIGNIFICANT CHANGE UP (ref 27.5–35.5)
AST SERPL-CCNC: 28 U/L — SIGNIFICANT CHANGE UP (ref 10–40)
BASOPHILS # BLD AUTO: 0.21 K/UL — HIGH (ref 0–0.2)
BASOPHILS NFR BLD AUTO: 0.9 % — SIGNIFICANT CHANGE UP (ref 0–2)
BILIRUB SERPL-MCNC: 0.3 MG/DL — SIGNIFICANT CHANGE UP (ref 0.2–1.2)
BLD GP AB SCN SERPL QL: SIGNIFICANT CHANGE UP
BUN SERPL-MCNC: 21.8 MG/DL — HIGH (ref 8–20)
BUN SERPL-MCNC: 26 MG/DL — HIGH (ref 7–23)
CALCIUM SERPL-MCNC: 9.6 MG/DL — SIGNIFICANT CHANGE UP (ref 8.4–10.5)
CALCIUM SERPL-MCNC: 9.7 MG/DL — SIGNIFICANT CHANGE UP (ref 8.4–10.5)
CHLORIDE SERPL-SCNC: 102 MMOL/L — SIGNIFICANT CHANGE UP (ref 96–108)
CHLORIDE SERPL-SCNC: 98 MMOL/L — SIGNIFICANT CHANGE UP (ref 96–108)
CK MB BLD-MCNC: 1.8 % — SIGNIFICANT CHANGE UP (ref 0–3.5)
CK MB CFR SERPL CALC: 1.5 NG/ML — SIGNIFICANT CHANGE UP (ref 0–3.6)
CK SERPL-CCNC: 83 U/L — SIGNIFICANT CHANGE UP (ref 26–192)
CO2 SERPL-SCNC: 23 MMOL/L — SIGNIFICANT CHANGE UP (ref 22–29)
CO2 SERPL-SCNC: 28 MMOL/L — SIGNIFICANT CHANGE UP (ref 22–31)
CREAT SERPL-MCNC: 0.79 MG/DL — SIGNIFICANT CHANGE UP (ref 0.5–1.3)
CREAT SERPL-MCNC: 0.89 MG/DL — SIGNIFICANT CHANGE UP (ref 0.5–1.3)
EGFR: 62 ML/MIN/1.73M2 — SIGNIFICANT CHANGE UP
EGFR: 71 ML/MIN/1.73M2 — SIGNIFICANT CHANGE UP
EOSINOPHIL # BLD AUTO: 0 K/UL — SIGNIFICANT CHANGE UP (ref 0–0.5)
EOSINOPHIL NFR BLD AUTO: 0 % — SIGNIFICANT CHANGE UP (ref 0–6)
GIANT PLATELETS BLD QL SMEAR: PRESENT — SIGNIFICANT CHANGE UP
GLUCOSE SERPL-MCNC: 133 MG/DL — HIGH (ref 70–99)
GLUCOSE SERPL-MCNC: 94 MG/DL — SIGNIFICANT CHANGE UP (ref 70–99)
HCT VFR BLD CALC: 22.6 % — LOW (ref 34.5–45)
HCT VFR BLD CALC: 22.8 % — LOW (ref 34.5–45)
HCT VFR BLD CALC: 24.9 % — LOW (ref 34.5–45)
HGB BLD-MCNC: 7.3 G/DL — LOW (ref 11.5–15.5)
HGB BLD-MCNC: 7.5 G/DL — LOW (ref 11.5–15.5)
HGB BLD-MCNC: 8.3 G/DL — LOW (ref 11.5–15.5)
INR BLD: 1.28 RATIO — HIGH (ref 0.88–1.16)
LIDOCAIN IGE QN: 108 U/L — SIGNIFICANT CHANGE UP (ref 73–393)
LYMPHOCYTES # BLD AUTO: 20.4 % — SIGNIFICANT CHANGE UP (ref 13–44)
LYMPHOCYTES # BLD AUTO: 4.66 K/UL — HIGH (ref 1–3.3)
MAGNESIUM SERPL-MCNC: 2.1 MG/DL — SIGNIFICANT CHANGE UP (ref 1.6–2.6)
MANUAL SMEAR VERIFICATION: SIGNIFICANT CHANGE UP
MCHC RBC-ENTMCNC: 27.8 PG — SIGNIFICANT CHANGE UP (ref 27–34)
MCHC RBC-ENTMCNC: 28 PG — SIGNIFICANT CHANGE UP (ref 27–34)
MCHC RBC-ENTMCNC: 28.1 PG — SIGNIFICANT CHANGE UP (ref 27–34)
MCHC RBC-ENTMCNC: 32.3 GM/DL — SIGNIFICANT CHANGE UP (ref 32–36)
MCHC RBC-ENTMCNC: 32.9 GM/DL — SIGNIFICANT CHANGE UP (ref 32–36)
MCHC RBC-ENTMCNC: 33.3 GM/DL — SIGNIFICANT CHANGE UP (ref 32–36)
MCV RBC AUTO: 84.4 FL — SIGNIFICANT CHANGE UP (ref 80–100)
MCV RBC AUTO: 85.1 FL — SIGNIFICANT CHANGE UP (ref 80–100)
MCV RBC AUTO: 85.9 FL — SIGNIFICANT CHANGE UP (ref 80–100)
METAMYELOCYTES # FLD: 1.9 % — HIGH (ref 0–0)
MONOCYTES # BLD AUTO: 0.21 K/UL — SIGNIFICANT CHANGE UP (ref 0–0.9)
MONOCYTES NFR BLD AUTO: 0.9 % — LOW (ref 2–14)
MYELOCYTES NFR BLD: 2.8 % — HIGH (ref 0–0)
NEUTROPHILS # BLD AUTO: 15.66 K/UL — HIGH (ref 1.8–7.4)
NEUTROPHILS NFR BLD AUTO: 65.7 % — SIGNIFICANT CHANGE UP (ref 43–77)
NEUTS BAND # BLD: 2.8 % — SIGNIFICANT CHANGE UP (ref 0–8)
NRBC # BLD: 1 /100 WBCS — HIGH (ref 0–0)
NRBC # BLD: 5 /100 — HIGH (ref 0–0)
OVALOCYTES BLD QL SMEAR: SLIGHT — SIGNIFICANT CHANGE UP
PHOSPHATE SERPL-MCNC: 4 MG/DL — SIGNIFICANT CHANGE UP (ref 2.4–4.7)
PLAT MORPH BLD: NORMAL — SIGNIFICANT CHANGE UP
PLATELET # BLD AUTO: 37 K/UL — LOW (ref 150–400)
PLATELET # BLD AUTO: 39 K/UL — LOW (ref 150–400)
PLATELET # BLD AUTO: 43 K/UL — LOW (ref 150–400)
POIKILOCYTOSIS BLD QL AUTO: SLIGHT — SIGNIFICANT CHANGE UP
POTASSIUM SERPL-MCNC: 4 MMOL/L — SIGNIFICANT CHANGE UP (ref 3.5–5.3)
POTASSIUM SERPL-MCNC: 4.3 MMOL/L — SIGNIFICANT CHANGE UP (ref 3.5–5.3)
POTASSIUM SERPL-SCNC: 4 MMOL/L — SIGNIFICANT CHANGE UP (ref 3.5–5.3)
POTASSIUM SERPL-SCNC: 4.3 MMOL/L — SIGNIFICANT CHANGE UP (ref 3.5–5.3)
PROMYELOCYTES # FLD: 4.6 % — HIGH (ref 0–0)
PROT SERPL-MCNC: 7.7 G/DL — SIGNIFICANT CHANGE UP (ref 6–8.3)
PROTHROM AB SERPL-ACNC: 14.8 SEC — HIGH (ref 10.5–13.4)
RBC # BLD: 2.63 M/UL — LOW (ref 3.8–5.2)
RBC # BLD: 2.68 M/UL — LOW (ref 3.8–5.2)
RBC # BLD: 2.95 M/UL — LOW (ref 3.8–5.2)
RBC # FLD: 16.2 % — HIGH (ref 10.3–14.5)
RBC # FLD: 16.3 % — HIGH (ref 10.3–14.5)
RBC # FLD: 16.5 % — HIGH (ref 10.3–14.5)
RBC BLD AUTO: SIGNIFICANT CHANGE UP
SODIUM SERPL-SCNC: 129 MMOL/L — LOW (ref 135–145)
SODIUM SERPL-SCNC: 135 MMOL/L — SIGNIFICANT CHANGE UP (ref 135–145)
TROPONIN I, HIGH SENSITIVITY RESULT: 27.4 NG/L — SIGNIFICANT CHANGE UP
WBC # BLD: 18.87 K/UL — HIGH (ref 3.8–10.5)
WBC # BLD: 22.86 K/UL — HIGH (ref 3.8–10.5)
WBC # BLD: 24.73 K/UL — HIGH (ref 3.8–10.5)
WBC # FLD AUTO: 18.87 K/UL — HIGH (ref 3.8–10.5)
WBC # FLD AUTO: 22.86 K/UL — HIGH (ref 3.8–10.5)
WBC # FLD AUTO: 24.73 K/UL — HIGH (ref 3.8–10.5)

## 2023-05-23 PROCEDURE — 82550 ASSAY OF CK (CPK): CPT

## 2023-05-23 PROCEDURE — 82553 CREATINE MB FRACTION: CPT

## 2023-05-23 PROCEDURE — 74177 CT ABD & PELVIS W/CONTRAST: CPT | Mod: 26

## 2023-05-23 PROCEDURE — 71260 CT THORAX DX C+: CPT | Mod: 26

## 2023-05-23 PROCEDURE — 99291 CRITICAL CARE FIRST HOUR: CPT

## 2023-05-23 PROCEDURE — 85610 PROTHROMBIN TIME: CPT

## 2023-05-23 PROCEDURE — 99292 CRITICAL CARE ADDL 30 MIN: CPT

## 2023-05-23 PROCEDURE — 70450 CT HEAD/BRAIN W/O DYE: CPT | Mod: 26,MA,77

## 2023-05-23 PROCEDURE — 72192 CT PELVIS W/O DYE: CPT | Mod: MA

## 2023-05-23 PROCEDURE — 85025 COMPLETE CBC W/AUTO DIFF WBC: CPT

## 2023-05-23 PROCEDURE — 36430 TRANSFUSION BLD/BLD COMPNT: CPT

## 2023-05-23 PROCEDURE — 73552 X-RAY EXAM OF FEMUR 2/>: CPT

## 2023-05-23 PROCEDURE — 72131 CT LUMBAR SPINE W/O DYE: CPT | Mod: 26,MA

## 2023-05-23 PROCEDURE — 93010 ELECTROCARDIOGRAM REPORT: CPT

## 2023-05-23 PROCEDURE — 72192 CT PELVIS W/O DYE: CPT | Mod: 26,59,MA

## 2023-05-23 PROCEDURE — P9100: CPT

## 2023-05-23 PROCEDURE — 73552 X-RAY EXAM OF FEMUR 2/>: CPT | Mod: 26,LT

## 2023-05-23 PROCEDURE — 86901 BLOOD TYPING SEROLOGIC RH(D): CPT

## 2023-05-23 PROCEDURE — 73562 X-RAY EXAM OF KNEE 3: CPT | Mod: 26,LT

## 2023-05-23 PROCEDURE — 84484 ASSAY OF TROPONIN QUANT: CPT

## 2023-05-23 PROCEDURE — P9037: CPT

## 2023-05-23 PROCEDURE — 73562 X-RAY EXAM OF KNEE 3: CPT

## 2023-05-23 PROCEDURE — 70450 CT HEAD/BRAIN W/O DYE: CPT | Mod: MA

## 2023-05-23 PROCEDURE — 36415 COLL VENOUS BLD VENIPUNCTURE: CPT

## 2023-05-23 PROCEDURE — 83690 ASSAY OF LIPASE: CPT

## 2023-05-23 PROCEDURE — 93005 ELECTROCARDIOGRAM TRACING: CPT

## 2023-05-23 PROCEDURE — 86900 BLOOD TYPING SEROLOGIC ABO: CPT

## 2023-05-23 PROCEDURE — 72125 CT NECK SPINE W/O DYE: CPT | Mod: 26

## 2023-05-23 PROCEDURE — 85027 COMPLETE CBC AUTOMATED: CPT

## 2023-05-23 PROCEDURE — 72131 CT LUMBAR SPINE W/O DYE: CPT | Mod: MA

## 2023-05-23 PROCEDURE — 73502 X-RAY EXAM HIP UNI 2-3 VIEWS: CPT

## 2023-05-23 PROCEDURE — 85730 THROMBOPLASTIN TIME PARTIAL: CPT

## 2023-05-23 PROCEDURE — 99291 CRITICAL CARE FIRST HOUR: CPT | Mod: 25

## 2023-05-23 PROCEDURE — 73502 X-RAY EXAM HIP UNI 2-3 VIEWS: CPT | Mod: 26,LT

## 2023-05-23 PROCEDURE — 96374 THER/PROPH/DIAG INJ IV PUSH: CPT

## 2023-05-23 PROCEDURE — 80053 COMPREHEN METABOLIC PANEL: CPT

## 2023-05-23 PROCEDURE — 70450 CT HEAD/BRAIN W/O DYE: CPT | Mod: 26

## 2023-05-23 PROCEDURE — 86850 RBC ANTIBODY SCREEN: CPT

## 2023-05-23 RX ORDER — LIDOCAINE 4 G/100G
1 CREAM TOPICAL DAILY
Refills: 0 | Status: DISCONTINUED | OUTPATIENT
Start: 2023-05-23 | End: 2023-06-08

## 2023-05-23 RX ORDER — METOPROLOL TARTRATE 50 MG
25 TABLET ORAL DAILY
Refills: 0 | Status: DISCONTINUED | OUTPATIENT
Start: 2023-05-23 | End: 2023-05-23

## 2023-05-23 RX ORDER — ACETAMINOPHEN 500 MG
1000 TABLET ORAL ONCE
Refills: 0 | Status: COMPLETED | OUTPATIENT
Start: 2023-05-23 | End: 2023-05-23

## 2023-05-23 RX ORDER — AMITRIPTYLINE HCL 25 MG
25 TABLET ORAL EVERY 12 HOURS
Refills: 0 | Status: DISCONTINUED | OUTPATIENT
Start: 2023-05-23 | End: 2023-05-31

## 2023-05-23 RX ORDER — ATORVASTATIN CALCIUM 80 MG/1
40 TABLET, FILM COATED ORAL AT BEDTIME
Refills: 0 | Status: DISCONTINUED | OUTPATIENT
Start: 2023-05-23 | End: 2023-06-08

## 2023-05-23 RX ORDER — SIMVASTATIN 20 MG/1
1 TABLET, FILM COATED ORAL
Qty: 0 | Refills: 0 | DISCHARGE

## 2023-05-23 RX ORDER — SODIUM CHLORIDE 9 MG/ML
1000 INJECTION INTRAMUSCULAR; INTRAVENOUS; SUBCUTANEOUS
Refills: 0 | Status: DISCONTINUED | OUTPATIENT
Start: 2023-05-23 | End: 2023-05-24

## 2023-05-23 RX ORDER — METOPROLOL TARTRATE 50 MG
12.5 TABLET ORAL EVERY 12 HOURS
Refills: 0 | Status: DISCONTINUED | OUTPATIENT
Start: 2023-05-23 | End: 2023-06-08

## 2023-05-23 RX ORDER — ROMIPLOSTIM 250 UG/.5ML
62 INJECTION, POWDER, LYOPHILIZED, FOR SOLUTION SUBCUTANEOUS
Qty: 0 | Refills: 0 | DISCHARGE

## 2023-05-23 RX ORDER — CHLORHEXIDINE GLUCONATE 213 G/1000ML
1 SOLUTION TOPICAL DAILY
Refills: 0 | Status: DISCONTINUED | OUTPATIENT
Start: 2023-05-23 | End: 2023-05-25

## 2023-05-23 RX ORDER — METOPROLOL TARTRATE 50 MG
1 TABLET ORAL
Qty: 0 | Refills: 0 | DISCHARGE

## 2023-05-23 RX ORDER — DARBEPOETIN ALFA IN POLYSORBAT 200MCG/0.4
500 PEN INJECTOR (ML) SUBCUTANEOUS
Qty: 0 | Refills: 0 | DISCHARGE

## 2023-05-23 RX ORDER — METOPROLOL TARTRATE 50 MG
5 TABLET ORAL ONCE
Refills: 0 | Status: COMPLETED | OUTPATIENT
Start: 2023-05-23 | End: 2023-05-23

## 2023-05-23 RX ADMIN — Medication 5 MILLIGRAM(S): at 15:46

## 2023-05-23 RX ADMIN — SODIUM CHLORIDE 40 MILLILITER(S): 9 INJECTION INTRAMUSCULAR; INTRAVENOUS; SUBCUTANEOUS at 22:30

## 2023-05-23 NOTE — ED ADULT NURSE REASSESSMENT NOTE - STATUS
change of plan, pt to be going to [facility] instead of [facility]/awaiting transfer, no change

## 2023-05-23 NOTE — ED PROVIDER NOTE - INTERNATIONAL TRAVEL
Refill requested for:       rivaroxaban (XARELTO) 20 MG Tab 90 tablet 1 11/21/2019         Last office visit:     11/21/19      Please advise on refills.    
No

## 2023-05-23 NOTE — H&P ADULT - NSHPPHYSICALEXAM_GEN_ALL_CORE
T(C): 37.4 (05-23-23 @ 15:20), Max: 37.4 (05-23-23 @ 15:20)  HR: 73 (05-23-23 @ 18:22) (68 - 80)  BP: 144/63 (05-23-23 @ 18:22) (94/52 - 144/70)  RR: 17 (05-23-23 @ 18:22) (16 - 20)  SpO2: 100% (05-23-23 @ 18:22) (96% - 100%)    CONSTITUTIONAL: Well groomed, no apparent distress  EYES: PERRLA and symmetric, EOMI, No conjunctival or scleral injection, non-icteric  ENMT: Oral mucosa with moist membranes. Normal dentition; no pharyngeal injection or exudates             NECK: Supple, symmetric and without tracheal deviation   RESP: No respiratory distress, no use of accessory muscles; CTA b/l, no WRR  CV: RRR, +S1S2, no MRG; no JVD; no peripheral edema  GI: Soft, NT, ND, no rebound, no guarding; no palpable masses; no hepatosplenomegaly; no hernia palpated  LYMPH: No cervical LAD or tenderness; no axillary LAD or tenderness; no inguinal LAD or tenderness  MSK: Normal gait; No digital clubbing or cyanosis; examination of the (head/neck/spine/ribs/pelvis, RUE, LUE, RLE, LLE) without misalignment,            Normal ROM without pain, no spinal tenderness, normal muscle strength/tone, pelvis stable.    SKIN: No rashes or ulcers noted; no subcutaneous nodules or induration palpable  NEURO: CN II-XII intact; normal reflexes in upper and lower extremities, sensation intact in upper and lower extremities b/l to light touch   PSYCH: Appropriate insight/judgment; A+O x 3, mood and affect appropriate, recent/remote memory intact

## 2023-05-23 NOTE — ED PROVIDER NOTE - CARE PLAN
Principal Discharge DX:	Subdural hemorrhage  Secondary Diagnosis:	Fracture of multiple pubic rami  Secondary Diagnosis:	Thrombocytopenia   1

## 2023-05-23 NOTE — ED ADULT NURSE NOTE - NSFALLHARMRISKINTERV_ED_ALL_ED
Assistance OOB with selected safe patient handling equipment if applicable/Assistance with ambulation/Communicate risk of Fall with Harm to all staff, patient, and family/Monitor gait and stability/Provide visual cue: red socks, yellow wristband, yellow gown, etc/Reinforce activity limits and safety measures with patient and family/Bed in lowest position, wheels locked, appropriate side rails in place/Call bell, personal items and telephone in reach/Instruct patient to call for assistance before getting out of bed/chair/stretcher/Non-slip footwear applied when patient is off stretcher/[place] to call system/Physically safe environment - no spills, clutter or unnecessary equipment/Purposeful Proactive Rounding/Room/bathroom lighting operational, light cord in reach

## 2023-05-23 NOTE — ED PROVIDER NOTE - CLINICAL SUMMARY MEDICAL DECISION MAKING FREE TEXT BOX
89y Female transferred from [place] with SDH and pubic rami fracture in the setting of fall with near syncope. No anticoagulation. No headache, chest pain, focal weakness. Neurovascularly intact on examination. Trauma and neurosurgery consult. SICU admit.

## 2023-05-23 NOTE — ED PROVIDER NOTE - CHRONIC CONDITION OTHER
myelodysplastic syndrome, chronic anemia, anxiety, hyperlipidemia, hypertension, macular degeneration, osteoarthritis.

## 2023-05-23 NOTE — ED PROVIDER NOTE - CLINICAL SUMMARY MEDICAL DECISION MAKING FREE TEXT BOX
Patient is a 89-year-old female who presents to the emergency room status post fall.  Past medical history of myelodysplastic syndrome, chronic anemia, anxiety, hyperlipidemia, hypertension, macular degeneration, osteoarthritis.  Patient is not on anticoagulants.  Does endorse that she received 2 units of blood and medication for her platelets last week on Thursday secondary to anemia.  She reports that she believes she stood up too quickly today when she was ambulating to the bathroom she became lightheaded.  Upon reaching the bathroom she felt like she was going to pass out and she grabbed the shower curtain and fell to the ground.  She landed on her left side.  Denies actual syncope.  Denies striking her head.  Reports pain to her left leg most significant in the hip and gluteal region.  Has had difficulty ambulating since.  Denies headache visual changes nausea vomiting chest pain shortness of breath or abdominal pain.  Denies neck or upper back pain.  Denies extremity numbness. Patient presenting to the emergency room status post fall.  Did have a preceding episode of dizziness prior to this.  Medical history of MDS and anemia will obtain screening labs EKG cardiac markers and monitor.  Patient did suffer a fall with resulting left hip injury.  Patient has a history of low platelet count will obtain CT imaging of the head lumbar spine and pelvis.  Will obtain x-ray imaging of the hip femur and knee.  Will monitor. Independent review of EKG reveals NSR at 75 bpm Patient is a 89-year-old female who presents to the emergency room status post fall.  Past medical history of myelodysplastic syndrome, chronic anemia, anxiety, hyperlipidemia, hypertension, macular degeneration, osteoarthritis.  Patient is not on anticoagulants.  Does endorse that she received 2 units of blood and medication for her platelets last week on Thursday secondary to anemia.  She reports that she believes she stood up too quickly today when she was ambulating to the bathroom she became lightheaded.  Upon reaching the bathroom she felt like she was going to pass out and she grabbed the shower curtain and fell to the ground.  She landed on her left side.  Denies actual syncope.  Denies striking her head.  Reports pain to her left leg most significant in the hip and gluteal region.  Has had difficulty ambulating since.  Denies headache visual changes nausea vomiting chest pain shortness of breath or abdominal pain.  Denies neck or upper back pain.  Denies extremity numbness. Patient presenting to the emergency room status post fall.  Did have a preceding episode of dizziness prior to this.  Medical history of MDS and anemia will obtain screening labs EKG cardiac markers and monitor.  Patient did suffer a fall with resulting left hip injury.  Patient has a history of low platelet count will obtain CT imaging of the head lumbar spine and pelvis.  Will obtain x-ray imaging of the hip femur and knee.  Will monitor. Independent review of EKG reveals NSR at 75 bpm. Results of labs reviewed patient with an elevated white count at 24 hemoglobin 8.3 platelet count of 43 coincides with her history of myelodysplastic syndrome.  No significant electrolyte abnormality troponin within normal.  CT imaging of the lumbar spine reveals degenerative changes CT imaging of the pelvis reveals an acute fracture of the left inferior pubic rami with possible nondisplaced fracture of the left superior pubic rami degenerative changes of the hips.  CT imaging of the brain reveals an acute subdural hematoma in the interhemispheric region.  Transfer center contacted spoke with neurosurgery at [place] in agreement with transfer at this time we will begin platelet transfusion as platelet count is 43. Confirmed with pt that she is not on AC. Independent review of x-rays of the knee, femur, and hip reveal no acute fracture Patient is a 89-year-old female who presents to the emergency room status post fall.  Past medical history of myelodysplastic syndrome, chronic anemia, anxiety, hyperlipidemia, hypertension, macular degeneration, osteoarthritis.  Patient is not on anticoagulants.  Does endorse that she received 2 units of blood and medication for her platelets last week on Thursday secondary to anemia.  She reports that she believes she stood up too quickly today when she was ambulating to the bathroom she became lightheaded.  Upon reaching the bathroom she felt like she was going to pass out and she grabbed the shower curtain and fell to the ground.  She landed on her left side.  Denies actual syncope.  Denies striking her head.  Reports pain to her left leg most significant in the hip and gluteal region.  Has had difficulty ambulating since.  Denies headache visual changes nausea vomiting chest pain shortness of breath or abdominal pain.  Denies neck or upper back pain.  Denies extremity numbness. Patient presenting to the emergency room status post fall.  Did have a preceding episode of dizziness prior to this.  Medical history of MDS and anemia will obtain screening labs EKG cardiac markers and monitor.  Patient did suffer a fall with resulting left hip injury.  Patient has a history of low platelet count will obtain CT imaging of the head lumbar spine and pelvis.  Will obtain x-ray imaging of the hip femur and knee.  Will monitor. Independent review of EKG reveals NSR at 75 bpm. Results of labs reviewed patient with an elevated white count at 24 hemoglobin 8.3 platelet count of 43 coincides with her history of myelodysplastic syndrome.  No significant electrolyte abnormality troponin within normal.  CT imaging of the lumbar spine reveals degenerative changes CT imaging of the pelvis reveals an acute fracture of the left inferior pubic rami with possible nondisplaced fracture of the left superior pubic rami degenerative changes of the hips.  CT imaging of the brain reveals an acute subdural hematoma in the interhemispheric region.  Transfer center contacted spoke with neurosurgery at [place] in agreement with transfer at this time we will begin platelet transfusion as platelet count is 43. Confirmed with pt that she is not on AC. Independent review of x-rays of the knee, femur, and hip reveal no acute fracture. Pt was initially accepted to [place] Dr. Graham but due to volume capacity recontacted by transfer center will now go to [place]. Dr. Greenwood accepting for neurosurgery. Spoke with trauma team as well made aware of pubic rami fracture. pt is s/p 1 unit Plt. Per neurosurgery will repeat lt count if below 80 will transfuse. Remains stable. Pt and daughter updated

## 2023-05-23 NOTE — PATIENT PROFILE ADULT - NSPROGENSOURCEINFO_GEN_A_NUR
Requested that [facility] start incontinence products-male guards again as 3 months has passed.  VIRGINIA Durbin, [facility]   Tel [phone]  Fax [phone]    
family
Significant other

## 2023-05-23 NOTE — DISCHARGE NOTE PROVIDER - NPI NUMBER (FOR SYSADMIN USE ONLY) :
[[id number]] [[id number]] [[id number]],[[id number]],[[id number]] [[id number]]

## 2023-05-23 NOTE — ED PROVIDER NOTE - OBJECTIVE STATEMENT
Patient is a 89-year-old female who presents to the emergency room status post fall.  Past medical history of myelodysplastic syndrome, chronic anemia, anxiety, hyperlipidemia, hypertension, macular degeneration, osteoarthritis.  Patient is not on anticoagulants.  Does endorse that she received 2 units of blood and medication for her platelets last week on Thursday secondary to anemia.  She reports that she believes she stood up too quickly today when she was ambulating to the bathroom she became lightheaded.  Upon reaching the bathroom she felt like she was going to pass out and she grabbed the shower curtain and fell to the ground.  She landed on her left side.  Denies actual syncope.  Denies striking her head.  Reports pain to her left leg most significant in the hip and gluteal region.  Has had difficulty ambulating since.  Denies headache visual changes nausea vomiting chest pain shortness of breath or abdominal pain.  Denies neck or upper back pain.  Denies extremity numbness.

## 2023-05-23 NOTE — DISCHARGE NOTE PROVIDER - NSDCCPCAREPLAN_GEN_ALL_CORE_FT
PRINCIPAL DISCHARGE DIAGNOSIS  Diagnosis: SDH (subdural hematoma)  Assessment and Plan of Treatment: Please follow up with your primary care physician regarding your hospitalization. Follow up neurosurgery x 2 weeks. Return to ED if sudden and acute neurological deficits, inability to perform expected activities of daily living, diet intolerance, severe headaches.      SECONDARY DISCHARGE DIAGNOSES  Diagnosis: Fracture of pubic ramus  Assessment and Plan of Treatment: ORTHOPEDIC RECOMMENDATIONS: There will be no acute orthopedic surgical intervention at this time. Patient will be weight bearing as tolerated of left lower extremity. Pain control. DVTP per primary team/neurosurgery team. Follow-up in the office in 1 week for re-evaluation.     PRINCIPAL DISCHARGE DIAGNOSIS  Diagnosis: SDH (subdural hematoma)  Assessment and Plan of Treatment: Please follow up with your primary care physician regarding your hospitalization. Follow up neurosurgery x 2 weeks. Patient is advised to RETURN TO THE EMERGENCY DEPARTMENT for any of the following - worsening pain, fever/chills, nausea/vomiting, altered mental status, chest pain, shortness of breath, or any other new / worsening symptom. to ED if sudden and acute neurological deficits, inability to perform expected activities of daily living, diet intolerance, severe headaches.      SECONDARY DISCHARGE DIAGNOSES  Diagnosis: Fracture of pubic ramus  Assessment and Plan of Treatment: ORTHOPEDIC RECOMMENDATIONS: There will be no acute orthopedic surgical intervention at this time. Patient will be weight bearing as tolerated of left lower extremity. Pain control. DVTP per primary team/neurosurgery team. Follow-up in the office in 1 week for re-evaluation.    Diagnosis: Myelodysplastic syndrome  Assessment and Plan of Treatment: Please follow-up with the hematologist in approximately 1 week. CBC (complete blood count) should be drawn weekly while at rehab.    Diagnosis: Syndrome of inappropriate ADH (SIADH) secretion  Assessment and Plan of Treatment: Continue salt tabs as prescribed and 1000cc fluid restriction. Follow-up with your primary care provider after discharge for further management and monitoring of sodium levels.     PRINCIPAL DISCHARGE DIAGNOSIS  Diagnosis: SDH (subdural hematoma)  Assessment and Plan of Treatment: Please follow up with your primary care physician regarding your hospitalization. Follow up neurosurgery x 2 weeks. Patient is advised to RETURN TO THE EMERGENCY DEPARTMENT for any of the following - worsening pain, fever/chills, nausea/vomiting, altered mental status, chest pain, shortness of breath, or any other new / worsening symptom. to ED if sudden and acute neurological deficits, inability to perform expected activities of daily living, diet intolerance, severe headaches.      SECONDARY DISCHARGE DIAGNOSES  Diagnosis: Fracture of pubic ramus  Assessment and Plan of Treatment: ORTHOPEDIC RECOMMENDATIONS: There will be no acute orthopedic surgical intervention at this time. Patient will be weight bearing as tolerated of left lower extremity. Pain control. DVTP per primary team/neurosurgery team. Follow-up in the office in 1 week for re-evaluation.    Diagnosis: Myelodysplastic syndrome  Assessment and Plan of Treatment: As per DR. SPIVEY, PLEASE HAVE CBC (complete blood count) DRAWN WEEKLY and results faxed to her office (phone # listed below). Follow-up with Dr. Spivey approximately 1 week after discharge, as well.    Diagnosis: Syndrome of inappropriate ADH (SIADH) secretion  Assessment and Plan of Treatment: Continue salt tabs as prescribed and 1000cc fluid restriction. Follow-up with your primary care provider after discharge for further management and monitoring of sodium levels.

## 2023-05-23 NOTE — CONSULT NOTE ADULT - ASSESSMENT
89 y/o F PMH MDS, HTN, HLD transferred from [facility] s/p fall. No LOC.  -CTH at [facility] reveals small interhemispheric SDH.   -Pt. also found to have L pubic rami Fx.    Plan:  -D/w Dr. Greenwood  -Q1h neuro checks  -Repeat CTH now  -Platelet goal ; plts currently 37, recommend transfusing 1u plts  -Hold AC/AP agents at this time d/t increased bleeding risk  -SCDs for DVT PPX  -Pain control PRN  -Trauma eval  -Will follow

## 2023-05-23 NOTE — CONSULT NOTE ADULT - NS ATTEND AMEND GEN_ALL_CORE FT
Agree with PA note and plan as written.  Patient with MDS and thrombocytopenia and H/H low.  High likelihood of acute blood anemia related to pelvic injury and would recommend serial CBCs to ensure this is managed.  DVT prophylaxis to be maintained and would recommend SCDs at all time and eventual conversion to pharmacologic if not contraindicated.  High risk injury with potential for complications related to blood loss.  Ortho to follow daily, please call with any issues.

## 2023-05-23 NOTE — ED PROVIDER NOTE - CRITICAL CARE ATTENDING CONTRIBUTION TO CARE
Patient is a 89-year-old female who presents to the emergency room status post fall.  Past medical history of myelodysplastic syndrome, chronic anemia, anxiety, hyperlipidemia, hypertension, macular degeneration, osteoarthritis.  Patient is not on anticoagulants.  Does endorse that she received 2 units of blood and medication for her platelets last week on Thursday secondary to anemia.  She reports that she believes she stood up too quickly today when she was ambulating to the bathroom she became lightheaded.  Upon reaching the bathroom she felt like she was going to pass out and she grabbed the shower curtain and fell to the ground.  She landed on her left side.  Denies actual syncope.  Denies striking her head.  Reports pain to her left leg most significant in the hip and gluteal region.  Has had difficulty ambulating since.  Denies headache visual changes nausea vomiting chest pain shortness of breath or abdominal pain.  Denies neck or upper back pain.  Denies extremity numbness. Patient presenting to the emergency room status post fall.  Did have a preceding episode of dizziness prior to this.  Medical history of MDS and anemia will obtain screening labs EKG cardiac markers and monitor.  Patient did suffer a fall with resulting left hip injury.  Patient has a history of low platelet count will obtain CT imaging of the head lumbar spine and pelvis.  Will obtain x-ray imaging of the hip femur and knee.  Will monitor. Independent review of EKG reveals NSR at 75 bpm. Results of labs reviewed patient with an elevated white count at 24 hemoglobin 8.3 platelet count of 43 coincides with her history of myelodysplastic syndrome.  No significant electrolyte abnormality troponin within normal.  CT imaging of the lumbar spine reveals degenerative changes CT imaging of the pelvis reveals an acute fracture of the left inferior pubic rami with possible nondisplaced fracture of the left superior pubic rami degenerative changes of the hips.  CT imaging of the brain reveals an acute subdural hematoma in the interhemispheric region.  Transfer center contacted spoke with neurosurgery at [facility] in agreement with transfer at this time we will begin platelet transfusion as platelet count is 43. Confirmed with pt that she is not on AC. Independent review of x-rays of the knee, femur, and hip reveal no acute fracture. Pt consented for plt transfusion will begin Patient is a 89-year-old female who presents to the emergency room status post fall.  Past medical history of myelodysplastic syndrome, chronic anemia, anxiety, hyperlipidemia, hypertension, macular degeneration, osteoarthritis.  Patient is not on anticoagulants.  Does endorse that she received 2 units of blood and medication for her platelets last week on Thursday secondary to anemia.  She reports that she believes she stood up too quickly today when she was ambulating to the bathroom she became lightheaded.  Upon reaching the bathroom she felt like she was going to pass out and she grabbed the shower curtain and fell to the ground.  She landed on her left side.  Denies actual syncope.  Denies striking her head.  Reports pain to her left leg most significant in the hip and gluteal region.  Has had difficulty ambulating since.  Denies headache visual changes nausea vomiting chest pain shortness of breath or abdominal pain.  Denies neck or upper back pain.  Denies extremity numbness. Patient presenting to the emergency room status post fall.  Did have a preceding episode of dizziness prior to this.  Medical history of MDS and anemia will obtain screening labs EKG cardiac markers and monitor.  Patient did suffer a fall with resulting left hip injury.  Patient has a history of low platelet count will obtain CT imaging of the head lumbar spine and pelvis.  Will obtain x-ray imaging of the hip femur and knee.  Will monitor. Independent review of EKG reveals NSR at 75 bpm. Results of labs reviewed patient with an elevated white count at 24 hemoglobin 8.3 platelet count of 43 coincides with her history of myelodysplastic syndrome.  No significant electrolyte abnormality troponin within normal.  CT imaging of the lumbar spine reveals degenerative changes CT imaging of the pelvis reveals an acute fracture of the left inferior pubic rami with possible nondisplaced fracture of the left superior pubic rami degenerative changes of the hips.  CT imaging of the brain reveals an acute subdural hematoma in the interhemispheric region.  Transfer center contacted spoke with neurosurgery at [facility] in agreement with transfer at this time we will begin platelet transfusion as platelet count is 43. Confirmed with pt that she is not on AC. Independent review of x-rays of the knee, femur, and hip reveal no acute fracture. Pt consented for plt transfusion will begin. Pt was initially accepted to [facility] Dr. Graham but due to volume capacity recontacted by transfer center will now go to [place]. Dr. Greenwood accepting for neurosurgery. Spoke with trauma team as well made aware of pubic rami fracture. pt is s/p 1 unit Plt. Per neurosurgery will repeat lt count if below 80 will transfuse. Remains stable. Pt and daughter updated Patient is a 89-year-old female who presents to the emergency room status post fall.  Past medical history of myelodysplastic syndrome, chronic anemia, anxiety, hyperlipidemia, hypertension, macular degeneration, osteoarthritis.  Patient is not on anticoagulants.  Does endorse that she received 2 units of blood and medication for her platelets last week on Thursday secondary to anemia.  She reports that she believes she stood up too quickly today when she was ambulating to the bathroom she became lightheaded.  Upon reaching the bathroom she felt like she was going to pass out and she grabbed the shower curtain and fell to the ground.  She landed on her left side.  Denies actual syncope.  Denies striking her head.  Reports pain to her left leg most significant in the hip and gluteal region.  Has had difficulty ambulating since.  Denies headache visual changes nausea vomiting chest pain shortness of breath or abdominal pain.  Denies neck or upper back pain.  Denies extremity numbness. Patient presenting to the emergency room status post fall.  Did have a preceding episode of dizziness prior to this.  Medical history of MDS and anemia will obtain screening labs EKG cardiac markers and monitor.  Patient did suffer a fall with resulting left hip injury.  Patient has a history of low platelet count will obtain CT imaging of the head lumbar spine and pelvis.  Will obtain x-ray imaging of the hip femur and knee.  Will monitor. Independent review of EKG reveals NSR at 75 bpm. Results of labs reviewed patient with an elevated white count at 24 hemoglobin 8.3 platelet count of 43 coincides with her history of myelodysplastic syndrome.  No significant electrolyte abnormality troponin within normal.  CT imaging of the lumbar spine reveals degenerative changes CT imaging of the pelvis reveals an acute fracture of the left inferior pubic rami with possible nondisplaced fracture of the left superior pubic rami degenerative changes of the hips.  CT imaging of the brain reveals an acute subdural hematoma in the interhemispheric region.  Transfer center contacted spoke with neurosurgery at [facility] in agreement with transfer at this time we will begin platelet transfusion as platelet count is 43. Confirmed with pt that she is not on AC. Independent review of x-rays of the knee, femur, and hip reveal no acute fracture. Pt consented for plt transfusion will begin. Pt was initially accepted to [facility] Dr. Graham but due to volume capacity recontacted by transfer center will now go to [place]. Dr. Greenwood accepting for neurosurgery. Spoke with trauma team as well made aware of pubic rami fracture. pt is s/p 1 unit Plt. Per neurosurgery will repeat lt count if below 80 will transfuse. Remains stable. Pt and daughter updated. Plt count pending EMS arrived for transfer

## 2023-05-23 NOTE — ED PROVIDER NOTE - TOBACCO USE
LSW met with patient and provided written communication following Care Conference. LSW informed patient of recommendations for 2WW, WC, SC, BSC, HHC PT, OT, and Nursing. Patient has all recommended DME. Patient declined the list of Medicare participating [place] [id number] in the geographic area of the patient served. She stated that she has used an agency in the past and her spouse knows the name. Patient verbalized understanding and will choose an agency closer to discharge. Whiteboard updated. D/C Plan:  Estimated Date: May 6  DME: Has needed DME  HHC:  PT, OT, Nursing (Agency TBD)  To:   Home with spouse (family will transport) Never smoker

## 2023-05-23 NOTE — ED ADULT NURSE NOTE - NSICDXPASTMEDICALHX_GEN_ALL_CORE_FT
PAST MEDICAL HISTORY:  Anemia     Anxiety     HLD (hyperlipidemia)     HTN (hypertension)     Macular degeneration     MDS (myelodysplastic syndrome)     Osteoarthritis

## 2023-05-23 NOTE — DISCHARGE NOTE PROVIDER - NSDCFUADDINST_GEN_ALL_CORE_FT
ORTHOPEDIC RECOMMENDATIONS: There will be no acute orthopedic surgical intervention at this time. Patient will be weight bearing as tolerated of left lower extremity. Pain control. DVTP per primary team/neurosurgery team. Follow-up in the office in 1 week for re-evaluation.

## 2023-05-23 NOTE — PATIENT PROFILE ADULT - TOBACCO USE
Patient seen 7/20.  Liver labs were normal except Fibrosure c/w fatty liver.  US showed some hepatomegaly and fatty liver.  Spleen was normal in size.  I tried calling Dr. Stafford to find out about checking for parasites +/- lymphoma but was unable to get in touch with him. He still has a lot of fatigue.  He has a lot of achiness.  He has not had recurrent fevers.  He is eating.  His weight has been stable.   He ahs a mild LUQ pain that seems to get worse in certain positions.  It does not change with PO intake.  He has occaisonal nausea but denies regular baseline UGI symptoms.  In general he has regular BM's.  He denies LGI bleed or melena.  Last week he did have some BRB on the tissue with some rectal discomfort but this resolved.  He ahs not followed up again with Dr. Stafford Never smoker

## 2023-05-23 NOTE — ED ADULT NURSE NOTE - OBJECTIVE STATEMENT
Pt is a 89YOF who is here from [place], pt was initially seen for a fall in the shower and landing on her buttocks with left hip and buttocks pain, pt had a CT and she was told they found a bleed on CT read, pt transferred to [facility] for evaluation, pt is A&O4, pt reports throbbing in her sacral and left leg area, pt reports no headache, dizziness, nausea, visual disturbances, chest pain, shortness of breath, difficulty breathing.

## 2023-05-23 NOTE — ED PROVIDER NOTE - PHYSICAL EXAMINATION
no midline C/T/L TTP  TTP to left posterior gluteal region and hip region   scattered contusions noted to left leg  no TTP left knee, shin, ankle, foot.   no TTP to bilateral UE   NVI x 4

## 2023-05-23 NOTE — ED ADULT NURSE NOTE - ED STAT RN HANDOFF DETAILS
pt AAOX3, resp. even and unlabored on RA, pt transferred from [facility] for subdural seen on CT at [facility], pt c/o left sided hip/leg pain and sacrum pain after falling earlier today in the shower, pt admitted to MICU, pt placed on monitor for transport, 20g IV in LT AC, pt to have CT scans done before moving upstairs

## 2023-05-23 NOTE — ED ADULT NURSE NOTE - NSFALLHARMRISKINTERV_ED_ALL_ED
Assistance OOB with selected safe patient handling equipment if applicable/Assistance with ambulation/Communicate risk of Fall with Harm to all staff, patient, and family/Monitor gait and stability/Provide visual cue: red socks, yellow wristband, yellow gown, etc/Reinforce activity limits and safety measures with patient and family/Bed in lowest position, wheels locked, appropriate side rails in place/Call bell, personal items and telephone in reach/Instruct patient to call for assistance before getting out of bed/chair/stretcher/Non-slip footwear applied when patient is off stretcher/[place] to call system/Physically safe environment - no spills, clutter or unnecessary equipment/Purposeful Proactive Rounding/Room/bathroom lighting operational, light cord in reach

## 2023-05-23 NOTE — CONSULT NOTE ADULT - SUBJECTIVE AND OBJECTIVE BOX
Patient is a 89y old  Female who presents with a chief complaint of fall.    HPI: Pt. is a 89 y/o F PMH MDS, HTN, HLD transferred from [place] s/p fall. Pt. states this AM she was in the bathroom and moved too quickly and felt dizzy. She states as she was about to fall she grabbed the shower curtain to slow her fall. She states she fell onto her bottom and did not hit her head. Denies LOC. CTH at [place] reveals small interhemispheric SDH. Pt. also has pubic rami Fx. Pt. states she feels hungry/woozy but denies HA, visual disturbance, weakness, numbness/tingling, N/V/D. Pt. not on AC/AP agents at home.      PAST MEDICAL & SURGICAL HISTORY:  Macular degeneration      HTN (hypertension)      HLD (hyperlipidemia)      Osteoarthritis      Anxiety      Anemia      MDS (myelodysplastic syndrome)      No significant past surgical history        FAMILY HISTORY:  FH: CAD (coronary artery disease)    Family history of diabetes mellitus (DM)    Allergies    Proplene Glycol (Urticaria)  steroids - numbness to face (Unknown)  COBALT (Urticaria)  Balsam of [place] (Urticaria)    Intolerances    Levaquin (Other)    Vital Signs Last 24 Hrs  T(C): 37.4 (23 May 2023 15:20), Max: 37.4 (23 May 2023 15:20)  T(F): 99.3 (23 May 2023 15:20), Max: 99.3 (23 May 2023 15:20)  HR: 74 (23 May 2023 16:36) (68 - 80)  BP: 143/63 (23 May 2023 16:36) (94/52 - 144/70)  BP(mean): --  RR: 20 (23 May 2023 16:36) (16 - 20)  SpO2: 100% (23 May 2023 16:36) (96% - 100%)    Parameters below as of 23 May 2023 16:36  Patient On (Oxygen Delivery Method): room air    PHYSICAL EXAM:  GENERAL: NAD, well-groomed, pleasant  HEAD:  Atraumatic, normocephalic  EYES: Conjunctiva and sclera clear; corneal reflex intact  ENMT: Moist mucous membranes  NECK: Supple, no JVD  GABRIEL COMA SCORE: E-4 V-5 M- 6 = 15  MENTAL STATUS: AAO x3; Awake; Opens eyes spontaneously; Appropriately conversant without aphasia; following simple commands  CRANIAL NERVES: PERRL. EOMI without nystagmus. Facial sensation intact V1-3 distribution b/l. Face symmetric w/ normal eye closure and smile, tongue midline.   MOTOR: strength 5/5 b/l upper and lower extremities  SENSATION: grossly intact to light touch all extremities  SKIN: Warm, dry; no rashes or lesions    LABS:                        7.5    22.86 )-----------( 39       ( 23 May 2023 17:30 )             22.8     05-23    135  |  102  |  26<H>  ----------------------------<  133<H>  4.3   |  28  |  0.89    Ca    9.7      23 May 2023 12:10    TPro  7.7  /  Alb  3.2<L>  /  TBili  0.3  /  DBili  x   /  AST  28  /  ALT  39  /  AlkPhos  105  05-23    PT/INR - ( 23 May 2023 12:10 )   PT: 14.8 sec;   INR: 1.28 ratio         PTT - ( 23 May 2023 12:10 )  PTT:30.5 sec      RADIOLOGY & ADDITIONAL STUDIES:  < from: CT Head No Cont (05.23.23 @ 12:41) >  IMPRESSION: Small acute subdural hematoma suspected as described above.    Findings discussed with Dr. Purcell on May 23, 2023 at 12:33 PM  with   read back.    --- End of Report ---      DIONNE RUBI MD; Attending Radiologist  This document has been electronically signed. May 23 2023 12:34PM    < end of copied text >    < from: CT Pelvis No Cont (05.23.23 @ 12:43) >  IMPRESSION:    1.  Acute fracture of the left inferior pubic ramus with stellate   fracture lines extending into the ischium. Suspect nondisplaced fracture   of the left superior pubic ramus, although evaluation is limited by   osteopenia.    2.  Moderate degenerative arthritis of the hips. Multilevel lower lumbar   spondylosis.    3.  Extensive vascular atherosclerosis.    --- End of Report ---        SHLOMIT A GOLDBERG-STEIN MD; Attending Radiologist  This document has been electronically signed. May 23 2023  1:00PM    < end of copied text >

## 2023-05-23 NOTE — ED PROVIDER NOTE - DIFFERENTIAL DIAGNOSIS
Patient presenting to the emergency room status post fall.  Did have a preceding episode of dizziness prior to this.  Medical history of MDS and anemia will obtain screening labs EKG cardiac markers and monitor.  Patient did suffer a fall with resulting left hip injury.  Patient has a history of low platelet count will obtain CT imaging of the head lumbar spine and pelvis.  Will obtain x-ray imaging of the hip femur and knee.  Will monitor. Differential Diagnosis

## 2023-05-23 NOTE — H&P ADULT - NSHPLABSRESULTS_GEN_ALL_CORE
.  LABS:                         7.5    22.86 )-----------( 39       ( 23 May 2023 17:30 )             22.8     05-23    135  |  102  |  26<H>  ----------------------------<  133<H>  4.3   |  28  |  0.89    Ca    9.7      23 May 2023 12:10    TPro  7.7  /  Alb  3.2<L>  /  TBili  0.3  /  DBili  x   /  AST  28  /  ALT  39  /  AlkPhos  105  05-23    PT/INR - ( 23 May 2023 12:10 )   PT: 14.8 sec;   INR: 1.28 ratio         PTT - ( 23 May 2023 12:10 )  PTT:30.5 sec          RADIOLOGY, EKG & ADDITIONAL TESTS: Reviewed.   '  INTERPRETATION: Left hip, left femur, and left knee. Patient fell with local trauma.    Left hip. 2 views. Arterial calcifications noted.    There is mild degeneration diffusely around the left hip. No fracture.    Left femur. 2 views of the lower left femur show no fracture.    Left knee. AP and lateral views. Arterial calcifications. Minimal effusion. Moderate to advanced degeneration.    IMPRESSION: Degenerative findings as above. No fracture.    INTERPRETATION: Clinical indication: Dizziness and fall.    Multiple axial sections were performed from the base of vertex without contrast enhancement. Coronal and sagittal reconstructions were performed    This exam is compared with prior head CT performed on February 25, 2013.    Increased parenchymal volume loss and chronic microvascular ischemic changes are identified when compared with the prior exam.    Extra-axial high attenuation is identified along the interhemispheric region. This is likely compatible with a small acute subdural hematoma. This finding measures approximately 0.5 cm widest diameter..    Evaluation of the osseous structures with the appropriate window appears unremarkable    The visualized paranasal sinuses mastoid and middle ear regions appear clear.    Small extra-axial sebaceous cyst is seen involving the high left parietal region.    IMPRESSION: Small acute subdural hematoma suspected as described above.    INTERPRETATION:    : No additional findings.    Lower lumbar spine: Severe facet arthrosis and multilevel lumbar spondylosis, partially imaged/evaluated.    Bones: There is osteopenia. There is an acute oblique fracture lucency involving the left inferior pubic ramus with stellate fracture lines extending into the left ischium. There is minimal step-off at the site of fracture. There is a suspected nondisplaced fracture of the left superior pubic ramus near the pubic symphysis.Both hips demonstrate periarticular osteophytes and mild joint space narrowing. There is moderate osteitis pubis.    Soft tissues: No large localized hematoma. There is edema and enlargement of the adductor compartment musculature adjacent to the fracture. There is extensive vascular atherosclerosis.      IMPRESSION:    1. Acute fracture of the left inferior pubic ramus with stellate fracture lines extending into the ischium. Suspect nondisplaced fracture of the left superior pubic ramus, although evaluation is limited by osteopenia.    2. Moderate degenerative arthritis of the hips. Multilevel lower lumbar spondylosis.    3. Extensive vascular atherosclerosis.'

## 2023-05-23 NOTE — ED ADULT NURSE NOTE - OBJECTIVE STATEMENT
pt reports she fell this morning around 7am. states she went to use the bathroom in a rush, became dizzy, grabbed the shower curtain to catch the fall and fell slowly to left hip. denies head injury. denies LOC. denies dizziness at this time, denies CP. pain controlled at this time, c/o inability to straighten leg completely, using a rolled towel under knee to keep leg bent/elevated for comfort. pt reports shes been walking on left leg "carefully" since the fall. pt reports she fell this morning around 7am. states she went to use the bathroom in a rush, became dizzy, grabbed the shower curtain to catch the fall and fell "slower" to left hip. denies head injury. denies LOC. denies dizziness at this time, denies CP. pain controlled at this time, c/o inability to straighten leg completely, using a rolled towel under knee to keep leg bent/elevated for comfort. pt reports shes been walking on left leg "carefully" since the fall.

## 2023-05-23 NOTE — ED PROVIDER NOTE - OBJECTIVE STATEMENT
89y Female transferred from [place] with SDH and pubic rami fracture in the setting of fall with near syncope. No anticoagulation. No headache, chest pain, focal weakness. Neurovascularly intact on examination. Trauma and neurosurgery consult. 89y Female with history history of myelodysplastic syndrome, chronic anemia, anxiety, hyperlipidemia, hypertension, macular degeneration, osteoarthritis transferred from [place] with SDH and pubic rami fracture. Patient initially presented to [place] ED s/p fall with near syncope reporting dizziness. Denies head strike or LOC. Patient states she landed on her buttock. Received 1Unit Plts prior to transfer. Denies fevers, chills, headache, chest pain, palpitations, shortness of breath, cough, nausea, vomiting, diarrhea, hematuria, dysuria, dark stools, focal neurologic symptoms.

## 2023-05-23 NOTE — H&P ADULT - ATTENDING COMMENTS
89 year old F, PMH of HTN, HLD, history of leukemia, remission? with persistent thrombocytopenia, presenting after a fall in the shower, c/o buttock pain, unsure if she had head strike, unsure if she had loss of consciousness denies any additional complaints, vitals are stable, examination with no obvious signs of trauma, labs show anemia, leukocytosis[facility] Left Superior and inferior pubic rami fracture, ischium fracture? SDH on CT head  Awake alert but slightly confused  Bilateral BS  Hemodynamic intact  Abdomen soft, tender over the suprapubic area and pain L leg on motion  Neurologic grossly intact despite significant intracranial bleeding and 1.5cm shift    Admit to SICU  -Repeat CTH now  -Platelet goal ; plts currently 37, will transfuse PLT  -SCDs for DVT PPX  Orthopedics evaluation for pubic rami fractures   Multimodal pain control   Completion trauma scans, CT C spine, chest, abdomen with IV contrast 89 year old F, PMH of HTN, HLD, history of leukemia, remission? with persistent thrombocytopenia, presenting after a fall in the shower, c/o buttock pain, unsure if she had head strike, unsure if she had loss of consciousness denies any additional complaints, vitals are stable, examination with no obvious signs of trauma, labs show anemia, leukocytosis[facility] and [facility] pub[facility] rami [facility], ischium fracture? SDH on CT head  Awake alert but slightly confused  Bilateral BS  Hemodynamic intact  Abdomen soft, tender over the suprapubic area and pain L leg on motion  Neurologic grossly intact     Admit to SICU  -Repeat CTH now  -Platelet goal ; plts currently 37, will transfuse PLT  -SCDs for DVT PPX  Orthopedics evaluation for pubic rami fractures   Multimodal pain control   Completion trauma scans, CT C spine, chest, abdomen with IV contrast

## 2023-05-23 NOTE — DISCHARGE NOTE PROVIDER - NSDCMRMEDTOKEN_GEN_ALL_CORE_FT
amitriptyline 25 mg oral tablet: 1 tab(s) orally 2 times a day  Aranesp 500 mcg/mL injectable solution: 500 microgram(s) injectable every 3 weeks    Last dose: 2/3/23  Next dose: 2/24/23  metoprolol succinate 25 mg oral capsule, extended release: 1 cap(s) orally once a day  Nplate 250 mcg subcutaneous injection: 62 microgram(s) subcutaneous once a week    Last dose: 2/3/23  Next dose: 2/17/23  Zocor 80 mg oral tablet: 1 tab(s) orally once a day (at bedtime)   acetaminophen 325 mg oral tablet: 3 tab(s) orally every 6 hours As needed Temp greater or equal to 38C (100.4F), Mild Pain (1 - 3)  amitriptyline 25 mg oral tablet: 1 tab(s) orally 2 times a day  Aranesp 500 mcg/mL injectable solution: 500 microgram(s) injectable every 3 weeks    Last dose: 2/3/23  Next dose: 2/24/23  lidocaine 4% topical film: Apply topically to affected area once a day  metoprolol succinate 25 mg oral capsule, extended release: 1 cap(s) orally once a day  Nplate 250 mcg subcutaneous injection: 62 microgram(s) subcutaneous once a week    Last dose: 2/3/23  Next dose: 2/17/23  Zocor 80 mg oral tablet: 1 tab(s) orally once a day (at bedtime)   acetaminophen 325 mg oral tablet: 3 tab(s) orally every 6 hours As needed Temp greater or equal to 38C (100.4F), Mild Pain (1 - 3)  amitriptyline 25 mg oral tablet: 1 tab(s) orally 2 times a day  Aranesp 500 mcg/mL injectable solution: 500 microgram(s) injectable every 3 weeks    Last dose: 2/3/23  Next dose: 2/24/23  enoxaparin: 40 milligram(s) subcutaneous once a day  lidocaine 4% topical film: Apply topically to affected area once a day  metoprolol succinate 25 mg oral capsule, extended release: 1 cap(s) orally once a day  Nplate 250 mcg subcutaneous injection: 62 microgram(s) subcutaneous once a week    Last dose: 2/3/23  Next dose: 2/17/23  QUEtiapine: 12.5 milligram(s) orally once a day (at bedtime)  sodium chloride 1 g oral tablet: 1 tab(s) orally every 8 hours  Zocor 80 mg oral tablet: 1 tab(s) orally once a day (at bedtime)   acetaminophen 325 mg oral tablet: 3 tab(s) orally every 6 hours As needed Temp greater or equal to 38C (100.4F), Mild Pain (1 - 3)  amitriptyline 25 mg oral tablet: 1 tab(s) orally 2 times a day  Aranesp 500 mcg/mL injectable solution: 500 microgram(s) injectable every 3 weeks    Last dose: 2/3/23  Next dose: 2/24/23  enoxaparin: 40 milligram(s) subcutaneous once a day  lidocaine 4% topical film: Apply topically to affected area once a day  metoprolol succinate 25 mg oral capsule, extended release: 1 cap(s) orally once a day  Nplate 250 mcg subcutaneous injection: 62 microgram(s) subcutaneous once a week    Last dose: 2/3/23  Next dose: 2/17/23  OLANZapine 2.5 mg oral tablet: 1 tab(s) orally once  QUEtiapine: 12.5 milligram(s) orally once a day (at bedtime)  sodium chloride 1 g oral tablet: 1 tab(s) orally every 8 hours  Zocor 80 mg oral tablet: 1 tab(s) orally once a day (at bedtime)   acetaminophen 325 mg oral tablet: 3 tab(s) orally every 6 hours As needed Temp greater or equal to 38C (100.4F), Mild Pain (1 - 3)  amitriptyline 25 mg oral tablet: 1 tab(s) orally every 12 hours  Aranesp 500 mcg/mL injectable solution: 500 microgram(s) injectable every 3 weeks    Last dose: 2/3/23  Next dose: 2/24/23  enoxaparin: 40 milligram(s) subcutaneous once a day  lidocaine 4% topical film: Apply topically to affected area once a day  metoprolol succinate 25 mg oral capsule, extended release: 1 cap(s) orally once a day  Nplate 250 mcg subcutaneous injection: 62 microgram(s) subcutaneous once a week    Last dose: 2/3/23  Next dose: 2/17/23  OLANZapine 2.5 mg oral tablet: 1 tab(s) orally once  QUEtiapine: 12.5 milligram(s) orally once a day (at bedtime)  sodium chloride 1 g oral tablet: 1 tab(s) orally every 8 hours  Zocor 80 mg oral tablet: 1 tab(s) orally once a day (at bedtime)

## 2023-05-23 NOTE — DISCHARGE NOTE PROVIDER - DETAILS OF MALNUTRITION DIAGNOSIS/DIAGNOSES
This patient has been assessed with a concern for Malnutrition and was treated during this hospitalization for the following Nutrition diagnosis/diagnoses:     -  06/07/2023: Severe protein-calorie malnutrition

## 2023-05-23 NOTE — ED PROVIDER NOTE - PROGRESS NOTE DETAILS
Call placed to transfer center awaiting callback Pt was initially accepted to [place] Dr. Graham but due to volume capacity recontacted by transfer center will now go to [place]. Dr. Greenwood accepting for neurosurgery. Spoke with trauma team as well made aware of pubic rami fracture. pt is s/p 1 unit Plt. Per neurosurgery will repeat lt count if below 80 will transfuse. Remains stable. Pt and daughter updated

## 2023-05-23 NOTE — CONSULT NOTE ADULT - NS ATTEND AMEND GEN_ALL_CORE FT
NSGY Attg:    see above    patient seen and examined 5/24    agree with exam as above    imaging reviewed    agree with plan as above  no acute neurosurgical intervention

## 2023-05-23 NOTE — ED ADULT NURSE NOTE - CAS EDN DISCHARGE INTERVENTIONS
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 Patient is likely subtherapeutic on home diuretics and/or has an acute worsening of her renal function resulting in volume overload. Physical exam is not concerning for acute pulmonary edema, with clear auscultation; pending CXR and lung POCUS. Pending labs to evaluate renal function and determine the urgency of dialysis. Less likely DVT/PE causing symptoms, less likely ACS.
IV intact/Admission wristband placed

## 2023-05-23 NOTE — ED PROVIDER NOTE - CARDIAC, MLM
Bed: 05  Expected date: 12/2/21  Expected time: 5:25 PM  Means of arrival:   Comments:  DESTINY 65y male from home Hx.  Seizure, 3rd one today     Elroy Portillo RN  12/02/21 [id number]
No ECG performed. Pt left AMA prior to ECG.
Normal rate, regular rhythm.  Heart sounds S1, S2.

## 2023-05-23 NOTE — CONSULT NOTE ADULT - SUBJECTIVE AND OBJECTIVE BOX
Pt Name: ANITRA RINCON    MRN: [id number]      Patient is a 89y Female s/p transfer from [facility] s/p fall in bathroom. Patient seen at bedside. Patient reports falling in bathroom earlier today. After the fall patient endorses left buttock/hip pain. Unsure of head trauma. Denies LOC. Denies numbness or tingling. No other orthopedic complaints. Of note, patient found to have SDH on CT head-neurosurgery following.       REVIEW OF SYSTEMS    General: Alert, responsive, in NAD    Skin: No rashes, no pruritis     Musculoskeletal: SEE HPI.    Neurological: No sensory or motor changes.         PAST MEDICAL & SURGICAL HISTORY:  PAST MEDICAL & SURGICAL HISTORY:  Macular degeneration      HTN (hypertension)      HLD (hyperlipidemia)      Osteoarthritis      Anxiety      Anemia      MDS (myelodysplastic syndrome)      No significant past surgical history          Allergies: Proplene Glycol (Urticaria)  Levaquin (Other)  steroids - numbness to face (Unknown)  COBALT (Urticaria)  Balsam of [place] (Urticaria)      Medications: atorvastatin 40 milliGRAM(s) Oral at bedtime  metoprolol succinate ER 25 milliGRAM(s) Oral daily      FAMILY HISTORY:  FH: CAD (coronary artery disease)    Family history of diabetes mellitus (DM)    : non-contributory    Social History:                  7.5    22.86 )-----------( 39       ( 23 May 2023 17:30 )             22.8       05-23    135  |  102  |  26<H>  ----------------------------<  133<H>  4.3   |  28  |  0.89    Ca    9.7      23 May 2023 12:10    TPro  7.7  /  Alb  3.2<L>  /  TBili  0.3  /  DBili  x   /  AST  28  /  ALT  39  /  AlkPhos  105  05-23      Vital Signs Last 24 Hrs  T(C): 37.4 (23 May 2023 15:20), Max: 37.4 (23 May 2023 15:20)  T(F): 99.3 (23 May 2023 15:20), Max: 99.3 (23 May 2023 15:20)  HR: 73 (23 May 2023 18:22) (68 - 80)  BP: 144/63 (23 May 2023 18:22) (94/52 - 144/70)  BP(mean): --  RR: 17 (23 May 2023 18:22) (16 - 20)  SpO2: 100% (23 May 2023 18:22) (96% - 100%)    Parameters below as of 23 May 2023 18:22  Patient On (Oxygen Delivery Method): room air        Daily Height in cm: 154.94 (23 May 2023 16:36)    Daily       PHYSICAL EXAM:      Appearance: Alert, responsive, in no acute distress.    Neurological: Sensation is grossly intact to light touch.     Skin: no rash on visible skin. Skin is clean, dry and intact. No bleeding. No abrasions. No ulcerations.    Vascular: Cap refill < 2 sec. No signs of venous insufficiency or stasis. No extremity ulcerations. No cyanosis.    Musculoskeletal:         Left Upper Extremity: Clavicle: NTTP. Shoulder: NTTP, FROM. Abduction/adduction/flexion/extension intact. Elbow: NTTP, FROM. EE/EF intact. Wrist: NTTP, FROM. WE/WF intact. Hand: NTTP throughout, FROM. Abduction/adduction/flexion/extension of digits 1-5 intact. Compartments soft compressible. Sensation intact to light touch.        Right Upper Extremity: Clavicle: NTTP. Shoulder: NTTP, FROM. Abduction/adduction/flexion/extension intact. Elbow: NTTP, FROM. EE/EF intact. Wrist: NTTP, FROM. WE/WF intact. Hand: NTTP throughout, FROM. Abduction/adduction/flexion/extension of digits 1-5 intact. Compartments soft compressible. Sensation intact to light touch.        Left Lower Extremity: Hip: NTTP, FROM, negative log roll test. Pain radiating to groin with HF/HE. Knee: NTTP, FROM. KE/KF intact. Ankle: NTTP, FROM. DF/PF/EHL/FHL intact. Compartments soft compressible. Sensation intact to light touch. BCR, DP pulse intact.        Right Lower Extremity: Hip: NTTP, FROM, negative log roll test. HF/HE intact. Knee: NTTP, FROM. KE/KF intact. Ankle: NTTP, FROM. DF/PF/EHL/FHL intact. Compartments soft compressible. Sensation intact to light touch. BCR, DP pulse intact.     Imaging Studies:  < from: CT Pelvis No Cont (05.23.23 @ 12:43) >  ACC: [id number] EXAM:  CT PELVIS ONLY   ORDERED BY: DIEUDONNE ESTEBAN     PROCEDURE DATE:  05/23/2023          INTERPRETATION:  EXAMINATION: CT PELVIS    CLINICAL INDICATION:Fall with left hip pain    COMPARISON: Hip and femur radiographs, same day    TECHNIQUE: CT of the musculoskeletal pelvis was performed without   intravenous contrast.    INTERPRETATION:    : No additional findings.    Lower lumbar spine: Severe facet arthrosis and multilevel lumbar   spondylosis, partially imaged/evaluated.    Bones: There is osteopenia. There is an acute oblique fracture lucency   involving the left inferior pubic ramus with stellate fracture lines   extending into the left ischium. There is minimal step-off at the site of   fracture. There is a suspected nondisplaced fracture of the left superior   pubic ramus near the pubic symphysis.Both hips demonstrate periarticular   osteophytes and mild joint space narrowing. There is moderate osteitis   pubis.    Soft tissues: No large localized hematoma. There is edema and enlargement   of the adductor compartment musculature adjacent to the fracture. There   is extensive vascular atherosclerosis.      IMPRESSION:    1.  Acute fracture of the left inferior pubic ramus with stellate   fracture lines extending into the ischium. Suspect nondisplaced fracture   of the left superior pubic ramus, although evaluation is limited by   osteopenia.    2.  Moderate degenerative arthritis of the hips. Multilevel lower lumbar   spondylosis.    3.  Extensivevascular atherosclerosis.    --- End of Report ---    SHLOMIT A GOLDBERG-STEIN MD; Attending Radiologist  This document has been electronically signed. May 23 2023  1:00PM    < end of copied text >      A/P:  Pt is a  89y Female with left superior/inferior pubic rami fractures, left ischium fracture    PLAN:   - case/images/plan d/w Dr. Sykes  - pain control   - WBAT b/l LE when medically able/cleared by neurosurgery team  - orthopedically stable  - f/u with Dr. Sykes in the office in 1 weeks for re-evaluation

## 2023-05-23 NOTE — PATIENT PROFILE ADULT - FALL HARM RISK - HARM RISK INTERVENTIONS

## 2023-05-23 NOTE — ED PROVIDER NOTE - ATTENDING CONTRIBUTION TO CARE
seen with resident; jeanmarie elderly female with known transfusion-dependent myelodysplastic syndrome; s/p trip and fall earlier today at home, landed on buttocks; seen at OSH and found to have pubic rami fracture along with SDH in setting of low platelets; given plts at OSH; here for further eval; seen by neurosurg and trauma; on exam, patoient NAD, well appearing, no resp distress; head NCAT; abd soft nt nd; moving all ext with ease; plan to admit to SICU repeat CT.    I personally saw the patient with the resident, and completed the key components of the history and physical exam. I then discussed the management plan with the resident.    I have personally provided __30_ minutes of critical care time exclusive of time spent on separately billable procedures. Time includes review of laboratory data, radiology results, discussion with consultants, and monitoring for potential decompensation. Interventions were performed as documented above

## 2023-05-23 NOTE — H&P ADULT - HISTORY OF PRESENT ILLNESS
89 year old F, PMH of HTN, HLD, history of leukemia, remission? with persistent thrombocytopenia, presenting after a fall in the shower, c/o buttock pain, unsure if she had headstrike, unsure if she had loss of consciousness denies any additional complaints, no fever no chills no chest pain.  Denies falling frequently and denies palpitations or syncope.

## 2023-05-23 NOTE — H&P ADULT - ASSESSMENT
89 year old F, PMH of HTN, HLD, history of leukemia, remission? with persistent thrombocytopenia, presenting after a fall in the shower, c/o buttock pain, unsure if she had headstrike, unsure if she had loss of consciousness denies any additional complaints, vitals are stable, examination with no obvious signs of trauma, labs show anemia, leukocytosis, CT at [place] showed a Left Superior and inferior pubic rami fracture, ischium fracture? SDH on CT head    Admit to SICU  Neurosurgery would like   -Q1h neuro checks  -Repeat CTH now  -Platelet goal ; plts currently 37, recommend transfusing 1u plts  -SCDs for DVT PPX  Orthopedics evaluation for pubic rami fractures   Multimodal pain control   Completion trauma scans, CT C spine, chest, abdomen with IV contrast     D/w Dr. Villa, attending surgeon trauma

## 2023-05-23 NOTE — DISCHARGE NOTE PROVIDER - HOSPITAL COURSE
HPI:  89 year old F, PMH of HTN, HLD, history of leukemia, remission? with persistent thrombocytopenia, presenting after a fall in the shower, c/o buttock pain, unsure if she had headstrike, unsure if she had loss of consciousness denies any additional complaints, no fever no chills no chest pain.  Denies falling frequently and denies palpitations or syncope.   (23 May 2023 18:48)    Hospital Course:  Patient admitted to the SICU/ACS Service with L superior/inferior pubic rami fracture, small Traumatic SDH, and gluteal hematoma. Patient was transfused 1U PRBC while in SICU with good effect. Patient with acute delirium while in ICU requiring 1:1 placement. Patient was evaluated by Neurosurgery team who recommended conservative therapy for head injury. Repeat imaging was stable. Patient consulted by Orthopedics who recommended weight bear as tolerated for pubic rami fracture. Patient transferred to the med/surg floor. Evaluated by PT/OT/PMR and candidate for Acute Rehab. At time of discharge patients pain was in control, ADLs appropriately able to be performed, and diet tolerated.     Length of time preparing discharge > 30 minutes HPI:  89 year old F, PMH of HTN, HLD, history of leukemia, remission? with persistent thrombocytopenia, presenting after a fall in the shower, c/o buttock pain, unsure if she had headstrike, unsure if she had loss of consciousness denies any additional complaints, no fever no chills no chest pain.  Denies falling frequently and denies palpitations or syncope.   (23 May 2023 18:48)    Hospital Course:  Patient admitted to the SICU/ACS Service with L superior/inferior pubic rami fracture, small Traumatic SDH, and gluteal hematoma. Patient was transfused 1U PRBC while in SICU with good effect. Patient with acute delirium while in ICU requiring 1:1 placement. Patient was evaluated by Neurosurgery team who recommended conservative therapy for head injury. Repeat imaging was stable. Patient consulted by Orthopedics who recommended weight bear as tolerated for pubic rami fracture. Patient transferred to the med/surg floor. Evaluated by PT/OT/PMR and candidate for Acute Rehab. At time of discharge patients pain was in control, ADLs appropriately able to be performed, and diet tolerated. Patient with prolonged hospitalization due to 1:1 presence for agitation and patient also had hyponatremia. Worked up and treated with salt tabs    Length of time preparing discharge > 30 minutes HPI:  89 year old F, PMH of HTN, HLD, history of leukemia, remission? with persistent thrombocytopenia, presenting after a fall in the shower, c/o buttock pain, unsure if she had headstrike, unsure if she had loss of consciousness denies any additional complaints, no fever no chills no chest pain.  Denies falling frequently and denies palpitations or syncope.   (23 May 2023 18:48)    Hospital Course:  Patient admitted to the SICU/ACS Service with L superior/inferior pubic rami fracture, small Traumatic SDH, and gluteal hematoma. Patient was transfused 1U PRBC while in SICU with good effect. Patient with acute delirium while in ICU requiring 1:1 placement. Patient was evaluated by Neurosurgery team who recommended conservative therapy for head injury. Repeat imaging was stable. Patient consulted by Orthopedics who recommended weight bear as tolerated for pubic rami fracture. Patient transferred to the med/surg floor. Evaluated by PT/OT/PMR - recommendation was for acute rehab however insurance denied [place] and plan changed to [facility] upon discharge. At time of discharge patients pain was in control, ADLs appropriately able to be performed, and diet tolerated. Patient with prolonged hospitalization due to 1:1 presence for agitation and patient also had hyponatremia. Worked up and treated with salt tabs    Length of time preparing discharge > 30 minutes HPI:  89 year old F, PMH of HTN, HLD, history of leukemia, remission? with persistent thrombocytopenia, presenting after a fall in the shower, c/o buttock pain, unsure if she had headstrike, unsure if she had loss of consciousness denies any additional complaints, no fever no chills no chest pain.  Denies falling frequently and denies palpitations or syncope.   (23 May 2023 18:48)    Hospital Course:  Patient admitted to the SICU/ACS Service with L superior/inferior pubic rami fracture, small Traumatic SDH, and gluteal hematoma. Patient was transfused 1U PRBC while in SICU with good effect. Patient with acute delirium while in ICU requiring 1:1 placement. Patient was evaluated by Neurosurgery team who recommended conservative therapy for head injury. Repeat imaging was stable. Patient consulted by Orthopedics who recommended weight bear as tolerated for pubic rami fracture. Patient transferred to the med/surg floor. Evaluated by PT/OT/PMR - recommendation was for acute rehab however insurance denied [place] and plan changed to [facility] upon discharge. 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 Pt was kept on 1L fluid restriction & started on salt tabs. Patient has also been anemic - [facility] She has intermittently required blood transfusions throughout her stay. Hgb appropriately responded to transfusions and patient will be getting repeat blood work done at rehab weekly. At time of discharge patients pain was in control, ADLs appropriately able to be performed, and diet tolerated.     Length of time preparing discharge > 30 minutes

## 2023-05-23 NOTE — ED PROVIDER NOTE - PHYSICAL EXAMINATION
General: Well appearing in no acute distress, alert and cooperative  Head: Normocephalic, atraumatic  Eyes: PERRLA, no conjunctival injection, no scleral icterus, EOMI  ENMT: Atraumatic external nose and ears  Neck: Soft and supple, full ROM without pain  Cardiac: Regular rate and regular rhythm, no murmurs, peripheral pulses 2+ and symmetric in all extremities, no LE edema  Resp: Unlabored respiratory effort, lungs CTAB  Abd: Soft, non-tender, non-distended  MSK: Spine midline and non-tender  Skin: Warm and dry  Neuro: AO x 3, moves all extremities symmetrically, Motor strength 5/5 bilaterally UE and LE, sensation grossly intact

## 2023-05-23 NOTE — DISCHARGE NOTE PROVIDER - PROVIDER TOKENS
PROVIDER:[TOKEN:[[id number]]] PROVIDER:[TOKEN:[[id number]]],PROVIDER:[TOKEN:[[id number]]] PROVIDER:[TOKEN:[[id number]]],PROVIDER:[TOKEN:[[id number]]],PROVIDER:[TOKEN:[[id number]],FOLLOWUP:[1 week]] PROVIDER:[TOKEN:[[id number]]],PROVIDER:[TOKEN:[[id number]]],PROVIDER:[TOKEN:[[id number]],FOLLOWUP:[1 week]],PROVIDER:[TOKEN:[[id number]]]

## 2023-05-23 NOTE — DISCHARGE NOTE PROVIDER - CARE PROVIDER_API CALL
Peyman Rivero (DO)  Orthopaedic Surgery  [address] [place] [address]  Phone: [phone]  Fax: [phone]  Follow Up Time:    Peyman Armando  Orthopaedic Surgery  [address] [place] [address]  Phone: [phone]  Fax: [phone]  Follow Up Time:     Elijah Greenwood  Neurosurgery  [address] [facility], [address]  [place] [address]  Phone: [phone]  Fax: [phone]  Follow Up Time:    Peyman Armando  Orthopaedic Surgery  [address] [place] [address]  Phone: [phone]  Fax: [phone]  Follow Up Time:     Elijah Greenwood  Neurosurgery  [address], [address]  [place] [address]  Phone: [phone]  Fax: [phone]  Follow Up Time:     Hayden Dockery  Hematology/Oncology  [address]  [place] [address]  Phone: [phone]  Fax: [phone]  Follow Up Time: 1 week   Peyman Armando  Orthopaedic Surgery  [address] [place] [address]  Phone: [phone]  Fax: [phone]  Follow Up Time:     Elijah Greenwood  Neurosurgery  [address] [facility], [address]  [place] [address]  Phone: [phone]  Fax: [phone]  Follow Up Time:     Hayden Dockery  Hematology/Oncology  [address]  [place] [address]  Phone: [phone]  Fax: [phone]  Follow Up Time: 1 week    Jono Sumner  Hematology/Oncology  [address] [place] [address]  Phone: [phone]  Fax: [phone]  Follow Up Time:

## 2023-05-24 PROBLEM — D46.9 MYELODYSPLASTIC SYNDROME, UNSPECIFIED: Chronic | Status: ACTIVE | Noted: 2023-05-23

## 2023-05-24 LAB
ANION GAP SERPL CALC-SCNC: 10 MMOL/L — SIGNIFICANT CHANGE UP (ref 5–17)
BASOPHILS # BLD AUTO: 0 K/UL — SIGNIFICANT CHANGE UP (ref 0–0.2)
BASOPHILS NFR BLD AUTO: 0 % — SIGNIFICANT CHANGE UP (ref 0–2)
BUN SERPL-MCNC: 20.5 MG/DL — HIGH (ref 8–20)
CALCIUM SERPL-MCNC: 9.6 MG/DL — SIGNIFICANT CHANGE UP (ref 8.4–10.5)
CHLORIDE SERPL-SCNC: 102 MMOL/L — SIGNIFICANT CHANGE UP (ref 96–108)
CO2 SERPL-SCNC: 23 MMOL/L — SIGNIFICANT CHANGE UP (ref 22–29)
CREAT SERPL-MCNC: 0.7 MG/DL — SIGNIFICANT CHANGE UP (ref 0.5–1.3)
EGFR: 82 ML/MIN/1.73M2 — SIGNIFICANT CHANGE UP
EOSINOPHIL # BLD AUTO: 0 K/UL — SIGNIFICANT CHANGE UP (ref 0–0.5)
EOSINOPHIL NFR BLD AUTO: 0 % — SIGNIFICANT CHANGE UP (ref 0–6)
GLUCOSE SERPL-MCNC: 90 MG/DL — SIGNIFICANT CHANGE UP (ref 70–99)
HCT VFR BLD CALC: 20 % — CRITICAL LOW (ref 34.5–45)
HCT VFR BLD CALC: 25.9 % — LOW (ref 34.5–45)
HGB BLD-MCNC: 6.7 G/DL — CRITICAL LOW (ref 11.5–15.5)
HGB BLD-MCNC: 8.4 G/DL — LOW (ref 11.5–15.5)
LYMPHOCYTES # BLD AUTO: 18.4 % — SIGNIFICANT CHANGE UP (ref 13–44)
LYMPHOCYTES # BLD AUTO: 3.71 K/UL — HIGH (ref 1–3.3)
MAGNESIUM SERPL-MCNC: 2.1 MG/DL — SIGNIFICANT CHANGE UP (ref 1.8–2.6)
MANUAL SMEAR VERIFICATION: SIGNIFICANT CHANGE UP
MCHC RBC-ENTMCNC: 28.3 PG — SIGNIFICANT CHANGE UP (ref 27–34)
MCHC RBC-ENTMCNC: 28.3 PG — SIGNIFICANT CHANGE UP (ref 27–34)
MCHC RBC-ENTMCNC: 32.4 GM/DL — SIGNIFICANT CHANGE UP (ref 32–36)
MCHC RBC-ENTMCNC: 33.5 GM/DL — SIGNIFICANT CHANGE UP (ref 32–36)
MCV RBC AUTO: 84.4 FL — SIGNIFICANT CHANGE UP (ref 80–100)
MCV RBC AUTO: 87.2 FL — SIGNIFICANT CHANGE UP (ref 80–100)
METAMYELOCYTES # FLD: 3.5 % — HIGH (ref 0–0)
MONOCYTES # BLD AUTO: 0 K/UL — SIGNIFICANT CHANGE UP (ref 0–0.9)
MONOCYTES NFR BLD AUTO: 0 % — LOW (ref 2–14)
MYELOCYTES NFR BLD: 8.8 % — HIGH (ref 0–0)
NEUTROPHILS # BLD AUTO: 13.97 K/UL — HIGH (ref 1.8–7.4)
NEUTROPHILS NFR BLD AUTO: 65.8 % — SIGNIFICANT CHANGE UP (ref 43–77)
NEUTS BAND # BLD: 3.5 % — SIGNIFICANT CHANGE UP (ref 0–8)
NRBC # BLD: 2 /100 WBCS — HIGH (ref 0–0)
NRBC # BLD: 2 /100 — HIGH (ref 0–0)
OSMOLALITY SERPL: 284 MOSMOL/KG — SIGNIFICANT CHANGE UP (ref 280–301)
OVALOCYTES BLD QL SMEAR: SLIGHT — SIGNIFICANT CHANGE UP
PHOSPHATE SERPL-MCNC: 4.3 MG/DL — SIGNIFICANT CHANGE UP (ref 2.4–4.7)
PLAT MORPH BLD: NORMAL — SIGNIFICANT CHANGE UP
PLATELET # BLD AUTO: 31 K/UL — LOW (ref 150–400)
PLATELET # BLD AUTO: 76 K/UL — LOW (ref 150–400)
POIKILOCYTOSIS BLD QL AUTO: SLIGHT — SIGNIFICANT CHANGE UP
POLYCHROMASIA BLD QL SMEAR: SLIGHT — SIGNIFICANT CHANGE UP
POTASSIUM SERPL-MCNC: 4 MMOL/L — SIGNIFICANT CHANGE UP (ref 3.5–5.3)
POTASSIUM SERPL-SCNC: 4 MMOL/L — SIGNIFICANT CHANGE UP (ref 3.5–5.3)
RBC # BLD: 2.37 M/UL — LOW (ref 3.8–5.2)
RBC # BLD: 2.97 M/UL — LOW (ref 3.8–5.2)
RBC # FLD: 15.9 % — HIGH (ref 10.3–14.5)
RBC # FLD: 16.4 % — HIGH (ref 10.3–14.5)
RBC BLD AUTO: ABNORMAL
SMUDGE CELLS # BLD: PRESENT — SIGNIFICANT CHANGE UP
SODIUM SERPL-SCNC: 135 MMOL/L — SIGNIFICANT CHANGE UP (ref 135–145)
WBC # BLD: 20.16 K/UL — HIGH (ref 3.8–10.5)
WBC # BLD: 25.38 K/UL — HIGH (ref 3.8–10.5)
WBC # FLD AUTO: 20.16 K/UL — HIGH (ref 3.8–10.5)
WBC # FLD AUTO: 25.38 K/UL — HIGH (ref 3.8–10.5)

## 2023-05-24 PROCEDURE — 99222 1ST HOSP IP/OBS MODERATE 55: CPT

## 2023-05-24 PROCEDURE — 99233 SBSQ HOSP IP/OBS HIGH 50: CPT

## 2023-05-24 RX ORDER — ACETAMINOPHEN 500 MG
975 TABLET ORAL EVERY 6 HOURS
Refills: 0 | Status: DISCONTINUED | OUTPATIENT
Start: 2023-05-24 | End: 2023-06-08

## 2023-05-24 RX ADMIN — CHLORHEXIDINE GLUCONATE 1 APPLICATION(S): 213 SOLUTION TOPICAL at 12:40

## 2023-05-24 RX ADMIN — ATORVASTATIN CALCIUM 40 MILLIGRAM(S): 80 TABLET, FILM COATED ORAL at 22:04

## 2023-05-24 RX ADMIN — Medication 25 MILLIGRAM(S): at 17:27

## 2023-05-24 RX ADMIN — Medication 12.5 MILLIGRAM(S): at 05:59

## 2023-05-24 RX ADMIN — ATORVASTATIN CALCIUM 40 MILLIGRAM(S): 80 TABLET, FILM COATED ORAL at 00:12

## 2023-05-24 RX ADMIN — Medication 12.5 MILLIGRAM(S): at 17:27

## 2023-05-24 RX ADMIN — Medication 25 MILLIGRAM(S): at 05:59

## 2023-05-24 NOTE — OCCUPATIONAL THERAPY INITIAL EVALUATION ADULT - ADDITIONAL COMMENTS
Pt has tub with curtain and grab bars  Pt owns a shower chair and raised toilet seat  Pt is right handed

## 2023-05-24 NOTE — PROGRESS NOTE ADULT - ATTENDING COMMENTS
Patient seen and examined by me on AM rounds.   Admitted overnight s/p fall.   Injuries include  Thin parafalcine subdural hematoma  R gluteal hematoma   L inferior pubic ramus fx extending into ischium. WBAT     NEURO: GCS 15. Repeat CTH stable.     RESP: Stable on room air    CVS: Hemodynamically     GI: Tolerating regular diet     HEME: Thrombytopenia with plt of 31 secondary to hx of leukemia. In remission.c s/p 1 unit at OSH. Will transfuse to platelet goal of 80. SCDs for DVT ppx.   Hb trending down cassandra 6.7. Will give an unit of RBC.     ID: Afebrile, WBC 20. No evidnece of active infection.     ENDO: Glucvose controlled Patient seen and examined by me on AM rounds.   Admitted overnight s/p fall.   Injuries include:  -Thin parafalcine subdural hematoma  -R gluteal hematoma   -L inferior pubic ramus fx extending into ischium. Evaluated by ortho. Plan to manage non-op, WBAT.     Patient remains neurologically intact and stable from a cardiopulmonary standpoint. She has a history of leukemia current undergoing treatment at outside facility. Labs significant for anemia and thrombocytopenia which she says is chronic. She requires weekly transfusions but unclear what blood product. Will reach out to outpatient hematologist for collateral info. In the meantime will transfuse platelets for a goal of 80in the setting of intracranial hemorrhage and continue neurochecks.

## 2023-05-24 NOTE — PHYSICAL THERAPY INITIAL EVALUATION ADULT - GENERAL OBSERVATIONS, REHAB EVAL
Pt received in bed, + IV loc, +Tele//BP monitoring, breathing on RA in NAD, in 0/10 pain at rest but 7/10 pain with movement, agreeable to PT evaluation

## 2023-05-24 NOTE — PROGRESS NOTE ADULT - SUBJECTIVE AND OBJECTIVE BOX
Tertiary Trauma Survey (TTS)    Date of TTS: 05-24-23 @ 15:00                             Admit Date: 05-23-23 @ 18:35      Trauma Activation:    List Injuries Identified to Date:  1. SDH @ interhemispheric region  2. Left Superior and inferior pubic rami fracture  3. Right gluteal hematoma    List Operative and Interventional Radiological Procedures:       Physical Exam:  NEURO: alert, mentating appropriately, no facial asymmetry. gross sensation, motor, coordination are intact    HEENT: NC/AT, PERRL, EOMI, anicteric sclerae, no conjunctival pallor, oral mucus membranes moist, trachea midline, no JVD, neck is supple    Pulm/Chest: lungs CTA with equal breath sounds bilaterally, chest wall nontender and atraumatic    Cardiac: heart with reg rhythm S1, S2, no murmur; distal pulses intact and symmetric bilaterally    GI / Abdomen: normoactive bowel sounds, soft, nondistended, nontender, no palpable masses    Musculoskeletal / Extremities: extremities atraumatic and nontender, no edema, no asymmetry. +L inguinal ttp, +bruise ttp on R back hip. the neck has no midline tenderness, deformity, or stepoff, and is ranged painlessly.    Integumentary: warm, dry, no rash, no cyanosis.      RADIOLOGICAL FINDINGS REVIEW:  Head CT:  05/23/2023  @12:41  Increased parenchymal volume loss and chronic microvascular ischemic   changes are identified when compared with the prior exam.  Extra-axial high attenuation is identified along the interhemispheric   region. This is likely compatible with a small acute subdural hematoma.   This finding measures approximately 0.5 cm widest diameter..  Evaluation of the osseous structures with the appropriate window appears   unremarkable  The visualized paranasal sinuses mastoid and middle ear regions appear   clear.  Small extra-axial sebaceous cyst is seen involving the high left parietal   region.  IMPRESSION: Small acute subdural hematoma suspected as described above.    CT Lumbar spine: 05/23/2023  @12:41  Demineralization of the bones is seen.  Scoliosis convex to the left is identified  Disc space narrowing endplate sclerotic changes and osteophytes are seen   as well as vacuum disc changes. These findings are compatible with   degenerative changes most prominent at the L2-3 level.  No acute fractures or dislocations are seen.  T11-12: Hypertrophic changes involving both facet joints. Mild to   moderate narrowing of the right neural foramen is seen.  T12-L1: Normal  L1-2: Disc bulge and bilateral hypertrophic facet joint changes. Mild   narrowing of the spinal canal.  L2-3: Disc osteophyte complex is seen with bilateral hypertrophic facet   joint changes. Moderate narrowing of the spinal canal. Moderate to severe   narrowing of the left neural foramen.  L3-4: Disc bulge and bilateral hypertrophic facet joint changes. Mild to   moderate narrowing of the spinal canal. Moderate narrowing of both neural   foramen  L4-5: Disc bulge and bilateral hypertrophic facet joint. Moderate to   severe narrowing of the spinal canal. Mild narrowing of both neural   foramen.  L5-S1: Disc bulge and bilateral hypertrophic facet joint changes.   Moderate narrowing of the left neural foramen  Evaluation of the paraspinal soft tissues is limited by lack of IV   contrast though grossly normal  Dilated bladder is identified. Please correlate clinically.  IMPRESSION: Degenerative changes as described above.    CT pelvis: 05/23/2023  @12:41  : No additional findings.  Lower lumbar spine: Severe facet arthrosis and multilevel lumbar   spondylosis, partially imaged/evaluated.  Bones: There is osteopenia. There is an acute oblique fracture lucency   involving the left inferior pubic ramus with stellate fracture lines   extending into the left ischium. There is minimal step-off at the site of   fracture. There is a suspected nondisplaced fracture of the left superior   pubic ramus near the pubic symphysis.Both hips demonstrate periarticular   osteophytes and mild joint space narrowing. There is moderate osteitis   pubis.  Soft tissues: No large localized hematoma. There is edema and enlargement   of the adductor compartment musculature adjacent to the fracture. There   is extensive vascular atherosclerosis.  IMPRESSION:  1.  Acute fracture of the left inferior pubic ramus with stellate   fracture lines extending into the ischium. Suspect nondisplaced fracture   of the left superior pubic ramus, although evaluation is limited by   osteopenia.  2.  Moderate degenerative arthritis of the hips. Multilevel lower lumbar   spondylosis.  3.  Extensive vascular atherosclerosis.              INCIDENTAL FINDINGS:    [ ] No    [ ] Yes, Findings are:        [ ] Tertiary exam complete, there are no new injuries identified    [ ] Tertiary exam done, new injuries identified are:                [ ] Imaging ordered:

## 2023-05-24 NOTE — PROGRESS NOTE ADULT - SUBJECTIVE AND OBJECTIVE BOX
24 HOUR EVENTS: HD stable, comfortably resting in bed. No n/v. Tolerating latricia. Hgb 6.7, Plt 31 this morning ordered 1u PRBC and 1u Plt. Neuro intact ON.    SUBJECTIVE/ROS:  [x] A ten-point review of systems was otherwise negative except as noted.  [ ] Due to altered mental status/intubation, subjective information were not able to be obtained from the patient. History was obtained, to the extent possible, from review of the chart and collateral sources of information.    Vital Signs Last 24 Hrs  T(C): 37.2 (24 May 2023 03:20), Max: 37.4 (23 May 2023 15:20)  T(F): 98.9 (24 May 2023 03:20), Max: 99.3 (23 May 2023 15:20)  HR: 74 (24 May 2023 08:00) (68 - 82)  BP: 133/66 (24 May 2023 08:00) (94/52 - 144/70)  BP(mean): 87 (24 May 2023 08:00) (70 - 87)  RR: 16 (24 May 2023 08:00) (15 - 22)  SpO2: 100% (24 May 2023 08:00) (96% - 100%)    Parameters below as of 24 May 2023 08:00  Patient On (Oxygen Delivery Method): room air    Constitutional: patient resting comfortably in bed, in no acute distress  HEENT: EOMI, no active drainage or redness  Neck: Full ROM without pain  Respiratory: respirations are unlabored, no accessory muscle use, no conversational dyspnea  Cardiovascular: regular rate & rhythm  Gastrointestinal: Abdomen soft, non-tender, non-distended  Neurological: A&O x 3; no gross sensory / motor / coordination deficits  Musculoskeletal: No limitation of movement        NEURO  RASS:     GCS: 15    CAM ICU:  Exam:   Meds: amitriptyline 25 milliGRAM(s) Oral every 12 hours    [x] Adequacy of sedation and pain control has been assessed and adjusted      RESPIRATORY  RR: 16 (05-24-23 @ 08:00) (15 - 22)  SpO2: 100% (05-24-23 @ 08:00) (96% - 100%)  Exam: unlabored, clear to auscultation bilaterally  Mechanical Ventilation:       CARDIOVASCULAR  HR: 74 (05-24-23 @ 08:00) (68 - 82)  BP: 133/66 (05-24-23 @ 08:00) (94/52 - 144/70)  BP(mean): 87 (05-24-23 @ 08:00) (70 - 87)  Cardiac Rhythm: nsr  Perfusion     [x]Adequate   [ ]Inadequate  Mentation   [x]Normal       [ ]Reduced  Extremities  [x]Warm         [ ]Cool  Volume Status [x]Hypervolemic [ ]Euvolemic [ ]Hypovolemic  Meds: metoprolol tartrate 12.5 milliGRAM(s) Oral every 12 hours      GI/NUTRITION  Diet: reg  Meds:       GENITOURINARY  I&O's Detail  05-23 @ 07:01  -  05-24 @ 07:00  --------------------------------------------------------  IN:    sodium chloride 0.9%: 400 mL  Total IN: 400 mL    OUT:  Total OUT: 0 mL    Total NET: 400 mL      05-24 @ 07:01  -  05-24 @ 09:00  --------------------------------------------------------  IN:    sodium chloride 0.9%: 40 mL  Total IN: 40 mL    OUT:  Total OUT: 0 mL    Total NET: 40 mL    Weight (kg): 55.8 (05-23 @ 16:36), 55.8 (05-23 @ 11:08)  05-24    135  |  102  |  20.5<H>  ----------------------------<  90  4.0   |  23.0  |  0.70    Ca    9.6      24 May 2023 04:30  Phos  4.3     05-24  Mg     2.1     05-24    TPro  7.7  /  Alb  3.2<L>  /  TBili  0.3  /  DBili  x   /  AST  28  /  ALT  39  /  AlkPhos  105  05-23      HEMATOLOGIC  Meds:   [x] VTE Prophylaxis-- SCD                        6.7    20.16 )-----------( 31       ( 24 May 2023 04:30 )             20.0     PT/INR - ( 23 May 2023 12:10 )   PT: 14.8 sec;   INR: 1.28 ratio         PTT - ( 23 May 2023 12:10 )  PTT:30.5 sec  Transfusion     [ ] PRBC   [ ] Platelets   [ ] FFP   [ ] Cryoprecipitate      INFECTIOUS DISEASES  T(C): 37.2 (05-24-23 @ 03:20), Max: 37.4 (05-23-23 @ 15:20)  Wt(kg): --  WBC Count: 20.16 K/uL (05-24 @ 04:30)  WBC Count: 22.86 K/uL (05-23 @ 17:30)  WBC Count: 18.87 K/uL (05-23 @ 14:51)  WBC Count: 24.73 K/uL (05-23 @ 12:10)    Recent Cultures: -    Meds: -      ENDOCRINE  Capillary Blood Glucose    Meds: atorvastatin 40 milliGRAM(s) Oral at bedtime        ACCESS DEVICES:  [x] Peripheral IV  [ ] Central Venous Line	[ ] R	[ ] L	[ ] IJ	[ ] Fem	[ ] SC	Placed:   [ ] Arterial Line		[ ] R	[ ] L	[ ] Fem	[ ] Rad	[ ] Ax	Placed:   [ ] PICC:					[ ] Mediport  [ ] Urinary Catheter, Date Placed:   [ ] Necessity of urinary, arterial, and venous catheters discussed    OTHER MEDICATIONS:  chlorhexidine 2% Cloths 1 Application(s) Topical daily  lidocaine   4% Patch 1 Patch Transdermal daily

## 2023-05-24 NOTE — PROGRESS NOTE ADULT - ASSESSMENT
Assessment/Plan:  89 year old F, PMH of HTN, HLD, history of leukemia, with persistent thrombocytopenia (Plt 60 on Feb 2023), presenting after a fall in the shower, c/o buttock pain, unknown headstrike/LOC. Vitals are stable over the course, examination with no obvious signs of trauma, labs show anemia, leukocytosis, CT at [place] showed a Left Superior and inferior pubic rami fracture. SDH intrahemishere region on CT head. Repeat CTH last night no change. Neuro intact, HD stable ON. Hbg 6.7, Plt 31 this morning, 1u PRBC, 1u Plt ordered.    Neuro: Stable, intact, GCS15. No headahce/n/v.   Pulm: no issue  Cardiac: NSR, stable  GI: soft and nontender  : voiding, no issue  Endo: no issue  Heme/DVT: acute on chronic thrombocytopenia. 1U Plt ordered. SCDs only.  ID: no issue  Skin: no issue    Plan  -Platelet goal ; plts currently 31, will transfuse PLT  -SCDs for DVT PPX  -pain control  -Orthopedics evaluation for pubic rami fractures  Speaking Coherently

## 2023-05-24 NOTE — PHYSICAL THERAPY INITIAL EVALUATION ADULT - PERTINENT HX OF CURRENT PROBLEM, REHAB EVAL
89 year old F, PMH of HTN, HLD, history of leukemia, with persistent thrombocytopenia (Plt 60 on Feb 2023), presenting after a fall in the shower, c/o buttock pain, unknown headstrike/LOC. Vitals are stable over the course, examination with no obvious signs of trauma, labs show anemia, leukocytosis, CT at [place] showed a Left Superior and inferior pubic rami fracture. SDH intrahemishere region on CT head. Repeat CTH last night no change. Neuro intact, HD stable ON. Hbg 6.7, Plt 31 this morning, 1u PRBC, 1u Plt ordered.

## 2023-05-24 NOTE — PROGRESS NOTE ADULT - ASSESSMENT
ASSESSMENT: 89 year old female with past medical history of leukemia, remission? with persistent thrombocytopenia, presenting after a fall in the shower found with think parafalcine subdural hematoma    PLAN:   - No acute neurosurgical intervention indicated  - Repeat CT head with stable parafalcine SDH  - Repeat CT head if patient has a decline in neurologic status  - Platelet goal  to prevent expansion of hemorrhage; plts currently 31k  - Hold AC/AP agents at this time due to increased bleeding risk  - SCDs for DVT PPX  - Neurosurgery to s/o at this time; please reconsult as needed  - Discussed w/ Dr. Greenwood

## 2023-05-24 NOTE — PROGRESS NOTE ADULT - SUBJECTIVE AND OBJECTIVE BOX
ORTHO-TRAUMA SERVICE      Pt Name: ANITRA RINCON    MRN: [id number]      Patient is a 89y.o female being followed for Acute fracture of the left inferior pubic ramus. Patient reports participating with PT with minimal discomfort. Denies acute motor sensory changes. No new orthopedic complaints.       PAST MEDICAL & SURGICAL HISTORY:  PAST MEDICAL & SURGICAL HISTORY:  Macular degeneration      HTN (hypertension)      HLD (hyperlipidemia)      Osteoarthritis      Anxiety      Anemia      MDS (myelodysplastic syndrome)      No significant past surgical history          Allergies: Proplene Glycol (Urticaria)  Levaquin (Other)  steroids - numbness to face (Unknown)  COBALT (Urticaria)  Balsam of [place] (Urticaria)      Medications: acetaminophen     Tablet .. 975 milliGRAM(s) Oral every 6 hours PRN  amitriptyline 25 milliGRAM(s) Oral every 12 hours  atorvastatin 40 milliGRAM(s) Oral at bedtime  chlorhexidine 2% Cloths 1 Application(s) Topical daily  lidocaine   4% Patch 1 Patch Transdermal daily  metoprolol tartrate 12.5 milliGRAM(s) Oral every 12 hours                              6.7    20.16 )-----------( 31       ( 24 May 2023 04:30 )             20.0     05-24    135  |  102  |  20.5<H>  ----------------------------<  90  4.0   |  23.0  |  0.70    Ca    9.6      24 May 2023 04:30  Phos  4.3     05-24  Mg     2.1     05-24    TPro  7.7  /  Alb  3.2<L>  /  TBili  0.3  /  DBili  x   /  AST  28  /  ALT  39  /  AlkPhos  105  05-23      PHYSICAL EXAM:    Vital Signs Last 24 Hrs  T(C): 37.5 (24 May 2023 11:00), Max: 37.5 (24 May 2023 11:00)  T(F): 99.5 (24 May 2023 11:00), Max: 99.5 (24 May 2023 11:00)  HR: 76 (24 May 2023 12:45) (69 - 85)  BP: 127/51 (24 May 2023 12:45) (103/62 - 144/70)  BP(mean): 74 (24 May 2023 12:45) (66 - 106)  RR: 17 (24 May 2023 12:45) (13 - 22)  SpO2: 100% (24 May 2023 12:45) (92% - 100%)    Parameters below as of 24 May 2023 12:00  Patient On (Oxygen Delivery Method): room air      Daily Height in cm: 154.94 (23 May 2023 16:36)    Daily Weight in k.9 (24 May 2023 03:20)    Appearance: Alert, responsive, in no acute distress.    Neurological: Sensation is grossly intact to light touchNo focal deficits or weaknesses found.    Skin: no rash on visible skin. Skin is clean, dry and intact. No bleeding. No abrasions. No ulcerations.    Vascular: 2+ distal pulses. Cap refill < 2 sec. No signs of venous insufficiency or stasis. SCDs present. No extremity ulcerations. No cyanosis.    Musculoskeletal:      b/l LE + Dorsi plantar flexion. + EHL, FHL. Compartments soft, compressible. Denies numbness tingling..       < from: Xray Femur 2 Views, Left (23 @ 12:56) >    ACC: [id number] EXAM:  XR FEMUR 2 VIEWS LT   ORDERED BY: DIEUDONNE ESTEBAN     ACC: [id number] EXAM:  XR KNEE AP LAT OBL 3 VIEWS LT   ORDERED BY: DIEUDONNE ESTEBAN     ACC: [id number] EXAM:  XR HIP 2-3V LT   ORDERED BY: DIEUDONNE ESTEBAN     PROCEDURE DATE:  2023          INTERPRETATION:  Left hip, left femur, and left knee. Patient fell with   local trauma.    Left hip. 2 views. Arterial calcifications noted.    There is mild degeneration diffusely around the left hip. No fracture.    Left femur. 2 views of the lower left femur show no fracture.    Left knee. AP and lateral views. Arterial calcifications. Minimal   effusion. Moderate to advanced degeneration.    IMPRESSION: Degenerative findings as above. No fracture.    --- End of Report ---        < from: CT Pelvis No Cont (23 @ 12:43) >  ACC: [id number] EXAM:  CT PELVIS ONLY   ORDERED BY: DIEUDONNE ESTEBAN     PROCEDURE DATE:  2023          INTERPRETATION:  EXAMINATION: CT PELVIS    CLINICAL INDICATION:Fall with left hip pain    COMPARISON: Hip and femur radiographs, same day    TECHNIQUE: CT of the musculoskeletal pelvis was performed without   intravenous contrast.    INTERPRETATION:    : No additional findings.    Lower lumbar spine: Severe facet arthrosis and multilevel lumbar   spondylosis, partially imaged/evaluated.    Bones: There is osteopenia. There is an acute oblique fracture lucency   involving the left inferior pubic ramus with stellate fracture lines   extending into the left ischium. There is minimal step-off at the site of   fracture. There is a suspected nondisplaced fracture of the left superior   pubic ramus near the pubic symphysis.Both hips demonstrate periarticular   osteophytes and mild joint space narrowing. There is moderate osteitis   pubis.    Soft tissues: No large localized hematoma. There is edema and enlargement   of the adductor compartment musculature adjacent to the fracture. There   is extensive vascular atherosclerosis.      IMPRESSION:    1.  Acute fracture of the left inferior pubic ramus with stellate   fracture lines extending into the ischium. Suspect nondisplaced fracture   of the left superior pubic ramus, although evaluation is limited by   osteopenia.    2.  Moderate degenerative arthritis of the hips. Multilevel lower lumbar   spondylosis.    3.  Extensivevascular atherosclerosis.    --- End of Report ---      SHLOMIT A GOLDBERG-STEIN MD; Attending Radiologist  This document has been electronically signed. May 23 2023  1:00PM            A/P:  Pt is a  89y Female with Acute fracture of the left inferior pubic ramus     PLAN:   * Pain control as clinically inidicated  * DVTP: SCDs and as per primary team  * WBAT Pelvis b/l LE   * PT  * Continue Care as per primary team

## 2023-05-24 NOTE — PROGRESS NOTE ADULT - SUBJECTIVE AND OBJECTIVE BOX
SUBJECTIVE: Patient seen and examined in SICU with Dr. Greenwood during morning rounds. Patient is doing well and denies any headaches at this time. She also denies chest pain, SOB, nausea/vomiting, neck pain, paresthesias.     Vital Signs Last 24 Hrs  T(C): 37.5 (05-24-23 @ 11:00), Max: 37.5 (05-24-23 @ 11:00)  T(F): 99.5 (05-24-23 @ 11:00), Max: 99.5 (05-24-23 @ 11:00)  HR: 81 (05-24-23 @ 12:00) (69 - 85)  BP: 109/67 (05-24-23 @ 12:00) (103/62 - 144/70)  BP(mean): 82 (05-24-23 @ 12:00) (66 - 106)  RR: 20 (05-24-23 @ 12:00) (13 - 22)  SpO2: 94% (05-24-23 @ 12:00) (94% - 100%)    PHYSICAL EXAM:    Constitutional: No Acute Distress, sitting up comfortably in bed    Neurological: Awake, alert, oriented to person, place and time, speech clear and fluent, face equal, tongue midline, briskly following commands, no drift, moves all extremities with 5/5 strength, sensation intact to light touch throughout, pupils 4mm and reactive bilaterally, extraocular movements intact, no nystagmus      LABS:                          6.7    20.16 )-----------( 31       ( 24 May 2023 04:30 )             20.0    05-24    135  |  102  |  20.5<H>  ----------------------------<  90  4.0   |  23.0  |  0.70    Ca    9.6      24 May 2023 04:30  Phos  4.3     05-24  Mg     2.1     05-24    TPro  7.7  /  Alb  3.2<L>  /  TBili  0.3  /  DBili  x   /  AST  28  /  ALT  39  /  AlkPhos  105  05-23  PT/INR - ( 23 May 2023 12:10 )   PT: 14.8 sec;   INR: 1.28 ratio         PTT - ( 23 May 2023 12:10 )  PTT:30.5 sec    05-23 @ 07:01  -  05-24 @ 07:00  --------------------------------------------------------  IN: 400 mL / OUT: 0 mL / NET: 400 mL    05-24 @ 07:01  -  05-24 @ 12:03  --------------------------------------------------------  IN: 120 mL / OUT: 0 mL / NET: 120 mL        IMAGING:     CT Cervical Spine No Cont (05.23.23 @ 21:57)  IMPRESSION:  HEAD CT:  Thin parafalcine subdural hematoma is stable in size and   appearance.  CERVICAL SPINE CT:  No evidence of an acute cervical spine fracture.    --- End of Report ---    MARAH LORENZ MD; Attending Radiologist  This document has been electronically signed. May 24 2023  8:33AM      CT Abdomen and Pelvis w/ IV Cont (05.23.23 @ 21:57)  IMPRESSION:  Right gluteal soft tissue contusion with probable small intramuscular   right gluteus emy hematoma.    No evidence for acute thoracic or abdominal visceral injury.    --- End of Report ---    NAOMY CORNELIUS MD; Attending Radiologist  This document has been electronically signed. May 24 2023  5:50AM    CT Head No Cont (05.23.23 @ 21:50)    IMPRESSION:  HEAD CT:  Thin parafalcine subdural hematoma is stable in size and   appearance.  CERVICAL SPINE CT:  No evidence of an acute cervical spine fracture.    --- End of Report ---    MARAH LORENZ MD; Attending Radiologist  This document has been electronically signed. May 24 2023  8:33AM      MEDICATIONS:  Antibiotics:    Neuro:  acetaminophen     Tablet .. 975 milliGRAM(s) Oral every 6 hours PRN Temp greater or equal to 38C (100.4F), Mild Pain (1 - 3)  amitriptyline 25 milliGRAM(s) Oral every 12 hours    Cardiac:  metoprolol tartrate 12.5 milliGRAM(s) Oral every 12 hours    Pulm:    GI/:    Other:   atorvastatin 40 milliGRAM(s) Oral at bedtime  chlorhexidine 2% Cloths 1 Application(s) Topical daily  lidocaine   4% Patch 1 Patch Transdermal daily

## 2023-05-24 NOTE — PHYSICAL THERAPY INITIAL EVALUATION ADULT - ADDITIONAL COMMENTS
Pt reports living in private home, alone, family available to assist prn not 24/7. Pt has 3 KALEY with handrail and all needs met on the first floor except basement for laundry 8 steps down with handrail. Independent prior to admission. Owns no DME.

## 2023-05-24 NOTE — OCCUPATIONAL THERAPY INITIAL EVALUATION ADULT - PLANNED THERAPY INTERVENTIONS, OT EVAL
ADL retraining/balance training/bed mobility training/fine motor coordination training/motor coordination training/neuromuscular re-education/strengthening/transfer training

## 2023-05-25 LAB
ANION GAP SERPL CALC-SCNC: 10 MMOL/L — SIGNIFICANT CHANGE UP (ref 5–17)
BASOPHILS # BLD AUTO: 0.46 K/UL — HIGH (ref 0–0.2)
BASOPHILS NFR BLD AUTO: 1.8 % — SIGNIFICANT CHANGE UP (ref 0–2)
BUN SERPL-MCNC: 23.1 MG/DL — HIGH (ref 8–20)
CALCIUM SERPL-MCNC: 9.4 MG/DL — SIGNIFICANT CHANGE UP (ref 8.4–10.5)
CHLORIDE SERPL-SCNC: 101 MMOL/L — SIGNIFICANT CHANGE UP (ref 96–108)
CO2 SERPL-SCNC: 21 MMOL/L — LOW (ref 22–29)
CREAT SERPL-MCNC: 0.73 MG/DL — SIGNIFICANT CHANGE UP (ref 0.5–1.3)
EGFR: 78 ML/MIN/1.73M2 — SIGNIFICANT CHANGE UP
EOSINOPHIL # BLD AUTO: 0 K/UL — SIGNIFICANT CHANGE UP (ref 0–0.5)
EOSINOPHIL NFR BLD AUTO: 0 % — SIGNIFICANT CHANGE UP (ref 0–6)
GIANT PLATELETS BLD QL SMEAR: PRESENT — SIGNIFICANT CHANGE UP
GLUCOSE SERPL-MCNC: 104 MG/DL — HIGH (ref 70–99)
HCT VFR BLD CALC: 21.5 % — LOW (ref 34.5–45)
HGB BLD-MCNC: 7.1 G/DL — LOW (ref 11.5–15.5)
LYMPHOCYTES # BLD AUTO: 2.03 K/UL — SIGNIFICANT CHANGE UP (ref 1–3.3)
LYMPHOCYTES # BLD AUTO: 7.9 % — LOW (ref 13–44)
MAGNESIUM SERPL-MCNC: 1.9 MG/DL — SIGNIFICANT CHANGE UP (ref 1.6–2.6)
MANUAL SMEAR VERIFICATION: SIGNIFICANT CHANGE UP
MCHC RBC-ENTMCNC: 27.5 PG — SIGNIFICANT CHANGE UP (ref 27–34)
MCHC RBC-ENTMCNC: 33 GM/DL — SIGNIFICANT CHANGE UP (ref 32–36)
MCV RBC AUTO: 83.3 FL — SIGNIFICANT CHANGE UP (ref 80–100)
METAMYELOCYTES # FLD: 8 % — HIGH (ref 0–0)
MONOCYTES # BLD AUTO: 0.23 K/UL — SIGNIFICANT CHANGE UP (ref 0–0.9)
MONOCYTES NFR BLD AUTO: 0.9 % — LOW (ref 2–14)
MRSA PCR RESULT.: SIGNIFICANT CHANGE UP
MYELOCYTES NFR BLD: 2.6 % — HIGH (ref 0–0)
NEUTROPHILS # BLD AUTO: 19.35 K/UL — HIGH (ref 1.8–7.4)
NEUTROPHILS NFR BLD AUTO: 72.6 % — SIGNIFICANT CHANGE UP (ref 43–77)
NEUTS BAND # BLD: 2.6 % — SIGNIFICANT CHANGE UP (ref 0–8)
NRBC # BLD: 1 /100 — HIGH (ref 0–0)
OVALOCYTES BLD QL SMEAR: SLIGHT — SIGNIFICANT CHANGE UP
PHOSPHATE SERPL-MCNC: 3.6 MG/DL — SIGNIFICANT CHANGE UP (ref 2.4–4.7)
PLAT MORPH BLD: ABNORMAL
PLATELET # BLD AUTO: 60 K/UL — LOW (ref 150–400)
POIKILOCYTOSIS BLD QL AUTO: SLIGHT — SIGNIFICANT CHANGE UP
POLYCHROMASIA BLD QL SMEAR: SLIGHT — SIGNIFICANT CHANGE UP
POTASSIUM SERPL-MCNC: 4 MMOL/L — SIGNIFICANT CHANGE UP (ref 3.5–5.3)
POTASSIUM SERPL-SCNC: 4 MMOL/L — SIGNIFICANT CHANGE UP (ref 3.5–5.3)
PROMYELOCYTES # FLD: 1.8 % — HIGH (ref 0–0)
RBC # BLD: 2.58 M/UL — LOW (ref 3.8–5.2)
RBC # FLD: 15.4 % — HIGH (ref 10.3–14.5)
RBC BLD AUTO: ABNORMAL
S AUREUS DNA NOSE QL NAA+PROBE: SIGNIFICANT CHANGE UP
SODIUM SERPL-SCNC: 132 MMOL/L — LOW (ref 135–145)
VARIANT LYMPHS # BLD: 1.8 % — SIGNIFICANT CHANGE UP (ref 0–6)
WBC # BLD: 25.73 K/UL — HIGH (ref 3.8–10.5)
WBC # FLD AUTO: 25.73 K/UL — HIGH (ref 3.8–10.5)

## 2023-05-25 PROCEDURE — 99222 1ST HOSP IP/OBS MODERATE 55: CPT

## 2023-05-25 RX ORDER — LANOLIN ALCOHOL/MO/W.PET/CERES
5 CREAM (GRAM) TOPICAL AT BEDTIME
Refills: 0 | Status: DISCONTINUED | OUTPATIENT
Start: 2023-05-25 | End: 2023-06-08

## 2023-05-25 RX ORDER — ENOXAPARIN SODIUM 100 MG/ML
40 INJECTION SUBCUTANEOUS EVERY 24 HOURS
Refills: 0 | Status: DISCONTINUED | OUTPATIENT
Start: 2023-05-25 | End: 2023-06-08

## 2023-05-25 RX ORDER — ACETAMINOPHEN 500 MG
3 TABLET ORAL
Qty: 0 | Refills: 0 | DISCHARGE
Start: 2023-05-25

## 2023-05-25 RX ORDER — LIDOCAINE 4 G/100G
1 CREAM TOPICAL
Qty: 0 | Refills: 0 | DISCHARGE
Start: 2023-05-25

## 2023-05-25 RX ADMIN — Medication 12.5 MILLIGRAM(S): at 17:13

## 2023-05-25 RX ADMIN — CHLORHEXIDINE GLUCONATE 1 APPLICATION(S): 213 SOLUTION TOPICAL at 11:17

## 2023-05-25 RX ADMIN — Medication 5 MILLIGRAM(S): at 21:19

## 2023-05-25 RX ADMIN — Medication 25 MILLIGRAM(S): at 05:59

## 2023-05-25 RX ADMIN — Medication 12.5 MILLIGRAM(S): at 05:59

## 2023-05-25 RX ADMIN — ATORVASTATIN CALCIUM 40 MILLIGRAM(S): 80 TABLET, FILM COATED ORAL at 21:19

## 2023-05-25 RX ADMIN — Medication 25 MILLIGRAM(S): at 17:13

## 2023-05-25 NOTE — PROGRESS NOTE ADULT - SUBJECTIVE AND OBJECTIVE BOX
24h Events: Patient had drop in hgb again yesterday morning requiring 1 unit PRBC with adequate response. Also received 2 unit Plt for persistent thrombocytopenia with goal of  plt count. Pain is well controlled. Tolerating diet well. Neuro intact.     ICU Vital Signs Last 24 Hrs  T(C): 37.1 (24 May 2023 23:37), Max: 37.5 (24 May 2023 11:00)  T(F): 98.8 (24 May 2023 23:37), Max: 99.5 (24 May 2023 11:00)  HR: 87 (25 May 2023 00:00) (69 - 93)  BP: 133/57 (25 May 2023 00:00) (85/74 - 164/83)  BP(mean): 78 (25 May 2023 00:00) (66 - 143)  ABP: --  ABP(mean): --  RR: 19 (25 May 2023 00:00) (13 - 23)  SpO2: 95% (25 May 2023 00:00) (92% - 100%)    O2 Parameters below as of 24 May 2023 16:00  Patient On (Oxygen Delivery Method): room air      I&O's Detail    23 May 2023 07:01  -  24 May 2023 07:00  --------------------------------------------------------  IN:    sodium chloride 0.9%: 400 mL  Total IN: 400 mL    OUT:  Total OUT: 0 mL    Total NET: 400 mL      24 May 2023 07:01  -  25 May 2023 01:04  --------------------------------------------------------  IN:    Oral Fluid: 250 mL    Platelets - Single Donor: 427 mL    PRBCs (Packed Red Blood Cells): 364 mL    sodium chloride 0.9%: 120 mL  Total IN: 1161 mL    OUT:    Voided (mL): 600 mL  Total OUT: 600 mL    Total NET: 561 mL      MEDICATIONS  (STANDING):  amitriptyline 25 milliGRAM(s) Oral every 12 hours  atorvastatin 40 milliGRAM(s) Oral at bedtime  chlorhexidine 2% Cloths 1 Application(s) Topical daily  lidocaine   4% Patch 1 Patch Transdermal daily  metoprolol tartrate 12.5 milliGRAM(s) Oral every 12 hours    MEDICATIONS  (PRN):  acetaminophen     Tablet .. 975 milliGRAM(s) Oral every 6 hours PRN Temp greater or equal to 38C (100.4F), Mild Pain (1 - 3)      Physical Exam:    Constitutional: patient resting comfortably in bed, in no acute distress  HEENT: EOMI, no active drainage or redness  Neck: Full ROM without pain  Respiratory: respirations are unlabored, no accessory muscle use, no conversational dyspnea  Cardiovascular: regular rate & rhythm  Gastrointestinal: Abdomen soft, non-tender, non-distended  Neurological: A&O x 3; no gross sensory / motor / coordination deficits  Musculoskeletal: No limitation of movement    LABS:  CBC Full  -  ( 24 May 2023 16:05 )  WBC Count : 25.38 K/uL  RBC Count : 2.97 M/uL  Hemoglobin : 8.4 g/dL  Hematocrit : 25.9 %  Platelet Count - Automated : 76 K/uL  Mean Cell Volume : 87.2 fl  Mean Cell Hemoglobin : 28.3 pg  Mean Cell Hemoglobin Concentration : 32.4 gm/dL  Auto Neutrophil # : x  Auto Lymphocyte # : x  Auto Monocyte # : x  Auto Eosinophil # : x  Auto Basophil # : x  Auto Neutrophil % : x  Auto Lymphocyte % : x  Auto Monocyte % : x  Auto Eosinophil % : x  Auto Basophil % : x    05-24    135  |  102  |  20.5<H>  ----------------------------<  90  4.0   |  23.0  |  0.70    Ca    9.6      24 May 2023 04:30  Phos  4.3     05-24  Mg     2.1     05-24    TPro  7.7  /  Alb  3.2<L>  /  TBili  0.3  /  DBili  x   /  AST  28  /  ALT  39  /  AlkPhos  105  05-23    PT/INR - ( 23 May 2023 12:10 )   PT: 14.8 sec;   INR: 1.28 ratio         PTT - ( 23 May 2023 12:10 )  PTT:30.5 sec    RECENT CULTURES:      LIVER FUNCTIONS - ( 23 May 2023 12:10 )  Alb: 3.2 g/dL / Pro: 7.7 g/dL / ALK PHOS: 105 U/L / ALT: 39 U/L DA / AST: 28 U/L / GGT: x           CARDIAC MARKERS ( 23 May 2023 12:10 )  x     / x     / 83 U/L / x     / 1.5 ng/mL

## 2023-05-25 NOTE — CONSULT NOTE ADULT - SUBJECTIVE AND OBJECTIVE BOX
89 yo F known patient of Dr Torres at [facility] , h/o MDS on Aranesp last on 5/11 ans supportive transfusions , presenting after a fall in the shower, c/o buttock pain, unknown headstrike/LOC. CT at [place] showed a Left Superior and inferior pubic rami fracture. SDH intrahemishere region on CT head. Repeat CTH stable.  seen in ICU    ROS  as per HPI    PAST MEDICAL & SURGICAL HISTORY:  Macular degeneration      HTN (hypertension)      HLD (hyperlipidemia)      Osteoarthritis      Anxiety      Anemia      MDS (myelodysplastic syndrome)      No significant past surgical history      Social History:  Denies (23 May 2023 18:48)    FAMILY HISTORY:  FH: CAD (coronary artery disease)    Family history of diabetes mellitus (DM)    MEDICATIONS  (STANDING):  amitriptyline 25 milliGRAM(s) Oral every 12 hours  atorvastatin 40 milliGRAM(s) Oral at bedtime  chlorhexidine 2% Cloths 1 Application(s) Topical daily  enoxaparin Injectable 40 milliGRAM(s) SubCutaneous every 24 hours  lidocaine   4% Patch 1 Patch Transdermal daily  melatonin 5 milliGRAM(s) Oral at bedtime  metoprolol tartrate 12.5 milliGRAM(s) Oral every 12 hours    MEDICATIONS  (PRN):  acetaminophen     Tablet .. 975 milliGRAM(s) Oral every 6 hours PRN Temp greater or equal to 38C (100.4F), Mild Pain (1 - 3)    ICU Vital Signs Last 24 Hrs  T(C): 36.8 (25 May 2023 11:07), Max: 37.3 (24 May 2023 16:00)  T(F): 98.3 (25 May 2023 11:07), Max: 99.1 (24 May 2023 16:00)  HR: 79 (25 May 2023 10:00) (73 - 93)  BP: 125/63 (25 May 2023 10:00) (85/74 - 164/83)  BP(mean): 76 (25 May 2023 10:00) (54 - 143)  ABP: --  ABP(mean): --  RR: 22 (25 May 2023 10:00) (13 - 23)  SpO2: 98% (25 May 2023 10:00) (92% - 100%)    O2 Parameters below as of 25 May 2023 07:05  Patient On (Oxygen Delivery Method): room air      Gen - alert and oriented  Heart - S1S2 RRR  Lung - Dec BS B/L  Abd - soft ND NT  eXT - NO EDEMA                          7.1    25.73 )-----------( 60       ( 25 May 2023 04:05 )             21.5     05-25    132<L>  |  101  |  23.1<H>  ----------------------------<  104<H>  4.0   |  21.0<L>  |  0.73    Ca    9.4      25 May 2023 04:05  Phos  3.6     05-25  Mg     1.9     05-25    TPro  7.7  /  Alb  3.2<L>  /  TBili  0.3  /  DBili  x   /  AST  28  /  ALT  39  /  AlkPhos  105  05-23   89 yo F known patient of Dr Torres at [facility] , h/o MDS on Aranesp last on 5/11 ans supportive transfusions , presenting after a fall in the shower, c/o buttock pain, unknown headstrike/LOC. CT at [place] showed a Left Superior and inferior pubic rami fracture. SDH intrahemishere region on CT head. Repeat CTH stable.  seen in ICU  resting comfortably  no active bleed, afebrile    ROS  as per HPI    PAST MEDICAL & SURGICAL HISTORY:  Macular degeneration      HTN (hypertension)      HLD (hyperlipidemia)      Osteoarthritis      Anxiety      Anemia      MDS (myelodysplastic syndrome)      No significant past surgical history      Social History:  Denies (23 May 2023 18:48)    FAMILY HISTORY:  FH: CAD (coronary artery disease)    Family history of diabetes mellitus (DM)    MEDICATIONS  (STANDING):  amitriptyline 25 milliGRAM(s) Oral every 12 hours  atorvastatin 40 milliGRAM(s) Oral at bedtime  chlorhexidine 2% Cloths 1 Application(s) Topical daily  enoxaparin Injectable 40 milliGRAM(s) SubCutaneous every 24 hours  lidocaine   4% Patch 1 Patch Transdermal daily  melatonin 5 milliGRAM(s) Oral at bedtime  metoprolol tartrate 12.5 milliGRAM(s) Oral every 12 hours    MEDICATIONS  (PRN):  acetaminophen     Tablet .. 975 milliGRAM(s) Oral every 6 hours PRN Temp greater or equal to 38C (100.4F), Mild Pain (1 - 3)    ICU Vital Signs Last 24 Hrs  T(C): 36.8 (25 May 2023 11:07), Max: 37.3 (24 May 2023 16:00)  T(F): 98.3 (25 May 2023 11:07), Max: 99.1 (24 May 2023 16:00)  HR: 79 (25 May 2023 10:00) (73 - 93)  BP: 125/63 (25 May 2023 10:00) (85/74 - 164/83)  BP(mean): 76 (25 May 2023 10:00) (54 - 143)  ABP: --  ABP(mean): --  RR: 22 (25 May 2023 10:00) (13 - 23)  SpO2: 98% (25 May 2023 10:00) (92% - 100%)    O2 Parameters below as of 25 May 2023 07:05  Patient On (Oxygen Delivery Method): room air      Gen - alert and oriented  Heart - S1S2 RRR  Lung - Dec BS B/L  Abd - soft ND NT  eXT - NO EDEMA                          7.1    25.73 )-----------( 60       ( 25 May 2023 04:05 )             21.5     05-25    132<L>  |  101  |  23.1<H>  ----------------------------<  104<H>  4.0   |  21.0<L>  |  0.73    Ca    9.4      25 May 2023 04:05  Phos  3.6     05-25  Mg     1.9     05-25    TPro  7.7  /  Alb  3.2<L>  /  TBili  0.3  /  DBili  x   /  AST  28  /  ALT  39  /  AlkPhos  105  05-23

## 2023-05-25 NOTE — PROGRESS NOTE ADULT - ASSESSMENT
ASSESSMENT: 89 year old F, PMH of HTN, HLD, history of leukemia, with persistent thrombocytopenia (Plt 60 on Feb 2023), presenting after a fall in the shower, c/o buttock pain, unknown headstrike/LOC. CT at [place] showed a Left Superior and inferior pubic rami fracture. SDH intrahemishere region on CT head. Repeat CTH stable. Neuro intact.     Plan:    Neuro:   - q4h neuro checks. NSX signed off 5/24.  - CTH showed stable parafalcine SDH  - Delirium precautions  - Optimize sleep/wake cycle    CV:   - Continue non-invasive hemodynamic monitoring  - Maintain MAP > 65    Pulm:   - Room air  - Pulmonary toilet    GI/Nutrition:   - Regular diet  - Monitor bowel movements    /Renal:   - Voiding spontaneously  - Monitor kidney fxn  - Monitor UOP    ID:  - Monitor fever curve  - Monitor Leukocytosis    Endo:  - Monitor blood glucose    Skin:   - Repositioning for DTI prevention while in bed    Heme/DVT Prophylaxis:  - SCDs  - Chemical ppx held due to chronic thrombocytopenia and increased bleeding risk  - goal platelet count 80-100K. s/p 3 units plt this admission.     Lines:  - Peripheral IV's    Dispo:  - Downgrade level of care today  - PT/OT recommend Acute rehab. PMR consulted for AR placement ASSESSMENT: 89 year old F, PMH of HTN, HLD, history of leukemia, with persistent thrombocytopenia (Plt 60 on Feb 2023), presenting after a fall in the shower, c/o buttock pain, unknown headstrike/LOC. CT at [place] showed a Left Superior and inferior pubic rami fracture. SDH intrahemishere region on CT head. Repeat CTH stable. Neuro intact.     Plan:    Neuro:   - q4h neuro checks. NSX signed off 5/24.  - CTH showed stable parafalcine SDH  - Delirium precautions  - Optimize sleep/wake cycle    CV:   - Continue non-invasive hemodynamic monitoring  - Maintain MAP > 65    Pulm:   - Room air  - Pulmonary toilet    GI/Nutrition:   - Regular diet  - Monitor bowel movements    /Renal:   - Voiding spontaneously  - Monitor kidney fxn  - Monitor UOP    ID:  - Monitor fever curve  - Monitor Leukocytosis    Endo:  - Monitor blood glucose    Skin:   - Repositioning for DTI prevention while in bed    Heme/DVT Prophylaxis:  - SCDs  - Chemical ppx held due to chronic thrombocytopenia and increased bleeding risk  - goal platelet count 80-100K. s/p 3 units plt this admission.     Lines:  - Peripheral IV's    Dispo:  - Downgrade level of care today  - PT/OT recommend Acute rehab. PMR consulted for AR placement  - WBAT per ortho for L sup/inf pubic rami fx

## 2023-05-25 NOTE — PROGRESS NOTE ADULT - SUBJECTIVE AND OBJECTIVE BOX
Pt Name: ANITRA RINCON    MRN: [id number]      Patient is a 89 yo female being followed for fractures of the left superior & inferior pubic ramus. Patient reports participating with PT with minimal discomfort. Denies acute motor sensory changes. No new orthopedic complaints.       PAST MEDICAL & SURGICAL HISTORY:  Macular degeneration      HTN (hypertension)      HLD (hyperlipidemia)      Osteoarthritis      Anxiety      Anemia      MDS (myelodysplastic syndrome)      No significant past surgical history          Allergies: Proplene Glycol (Urticaria)  Levaquin (Other)  steroids - numbness to face (Unknown)  COBALT (Urticaria)  Balsam of [place] (Urticaria)      Medications: acetaminophen     Tablet .. 975 milliGRAM(s) Oral every 6 hours PRN  amitriptyline 25 milliGRAM(s) Oral every 12 hours  atorvastatin 40 milliGRAM(s) Oral at bedtime  chlorhexidine 2% Cloths 1 Application(s) Topical daily  enoxaparin Injectable 40 milliGRAM(s) SubCutaneous every 24 hours  lidocaine   4% Patch 1 Patch Transdermal daily  melatonin 5 milliGRAM(s) Oral at bedtime  metoprolol tartrate 12.5 milliGRAM(s) Oral every 12 hours                          7.1    25.73 )-----------( 60       ( 25 May 2023 04:05 )             21.5     05-25    132<L>  |  101  |  23.1<H>  ----------------------------<  104<H>  4.0   |  21.0<L>  |  0.73    Ca    9.4      25 May 2023 04:05  Phos  3.6     05-25  Mg     1.9     05-25    TPro  7.7  /  Alb  3.2<L>  /  TBili  0.3  /  DBili  x   /  AST  28  /  ALT  39  /  AlkPhos  105  05-23      PHYSICAL EXAM:    Vital Signs Last 24 Hrs  T(C): 36.7 (25 May 2023 07:05), Max: 37.5 (24 May 2023 11:00)  T(F): 98.1 (25 May 2023 07:05), Max: 99.5 (24 May 2023 11:00)  HR: 79 (25 May 2023 10:00) (73 - 93)  BP: 125/63 (25 May 2023 10:00) (85/74 - 164/83)  BP(mean): 76 (25 May 2023 10:00) (54 - 143)  RR: 22 (25 May 2023 10:00) (13 - 23)  SpO2: 98% (25 May 2023 10:00) (92% - 100%)    Parameters below as of 25 May 2023 07:05  Patient On (Oxygen Delivery Method): room air      Daily     Daily     Appearance: Alert, responsive, in no acute distress.    Musculoskeletal:         Bilateral ankle dorsi/plantar flexion. EHL/FHL intact. SILT. Calves supple/nontender. BCR. No renetta TTP.       A/P: Pt is a 89y Female with left pubic rami fractures.      PLAN:   -Pain control.  -PT.  -WBAT.  -DVT PPx per primary team.   -Ortho to continue to follow.

## 2023-05-25 NOTE — DIETITIAN INITIAL EVALUATION ADULT - PERTINENT LABORATORY DATA
05-25    132<L>  |  101  |  23.1<H>  ----------------------------<  104<H>  4.0   |  21.0<L>  |  0.73    Ca    9.4      25 May 2023 04:05  Phos  3.6     05-25  Mg     1.9     05-25

## 2023-05-25 NOTE — DIETITIAN INITIAL EVALUATION ADULT - OTHER INFO
89 year old F, PMH of HTN, HLD, history of leukemia, with persistent thrombocytopenia (Plt 60 on Feb 2023), presenting after a fall in the shower, c/o buttock pain, unknown headstrike/LOC. CT at [place] showed a Left Superior and inferior pubic rami fracture. SDH intrahemishere region on CT head. Repeat CTH stable. Neuro intact.     Nutrition assessment completed. Pt sleeping soundly at time of interview, unable to obtain nutrition hx. Currently on a regular diet. Spoke with 1:1 at bedside who reports pt consumed ~50% of breakfast and ate well for lunch. Reports pt is tolerating diet without issue. RD to follow up.

## 2023-05-25 NOTE — PROGRESS NOTE ADULT - ATTENDING COMMENTS
89 year old F, PMH of HTN, HLD, history of leukemia, with persistent thrombocytopenia (Plt 60 on Feb 2023), presenting after a fall in the shower, c/o buttock pain, unknown headstrike/LOC. CT at [place] showed a Left Superior and inferior pubic rami fracture. SDH intrahemisheric region on CT head. Repeat CTH stable. Neuro intact.   Awake alert and neurologic intact  MAP within parameters of CPP  Bilateral BS  Hemodynamic intact  Abdomen soft, active BS  Neurologic grossly unchanged  PLT 60K with stable hemorrhage is adequate and will not transfuse more at this point.    Plan:    Neuro:   - q4h neuro checks. NSX signed off 5/24.  - CTH showed stable parafalcine SDH  - Delirium precautions  - Optimize sleep/wake cycle

## 2023-05-25 NOTE — DIETITIAN INITIAL EVALUATION ADULT - PERTINENT MEDS FT
MEDICATIONS  (STANDING):  amitriptyline 25 milliGRAM(s) Oral every 12 hours  atorvastatin 40 milliGRAM(s) Oral at bedtime  chlorhexidine 2% Cloths 1 Application(s) Topical daily  enoxaparin Injectable 40 milliGRAM(s) SubCutaneous every 24 hours  lidocaine   4% Patch 1 Patch Transdermal daily  melatonin 5 milliGRAM(s) Oral at bedtime  metoprolol tartrate 12.5 milliGRAM(s) Oral every 12 hours    MEDICATIONS  (PRN):  acetaminophen     Tablet .. 975 milliGRAM(s) Oral every 6 hours PRN Temp greater or equal to 38C (100.4F), Mild Pain (1 - 3)

## 2023-05-25 NOTE — CONSULT NOTE ADULT - ASSESSMENT
89 yo F known patient of Dr Torres at [facility] , h/o MDS on Aranesp last on 5/11 ans supportive transfusions , presenting after a fall in the shower, c/o buttock pain, unknown headstrike/LOC. CT at [place] showed a Left Superior and inferior pubic rami fracture. SDH intrahemishere region on CT head. Repeat CTH stable.    1) MDS  recent flow cytometry on blood showed 13% blasts  ? transformation to leukemia  given age and comorbidities not a candidate for aggressive therapy  c/w supportive transfusions  keep hgb > 7, platelet count > 20k  palliative care evaluation    2) L hip fracture  ortho following  conservative measures    3) SDH  repeat CTH stable  neurosx following  goal plt > 7-80k, given BM involved this may not be achieved with transfusions   plt count 60,000 today    4) DVT ppx  SCD    further mx as per ICU team 89 yo F known patient of Dr Torres at [facility] , h/o MDS on Aranesp last on 5/11 ans supportive transfusions , presenting after a fall in the shower, c/o buttock pain, unknown headstrike/LOC. CT at [place] showed a Left Superior and inferior pubic rami fracture. SDH intrahemishere region on CT head. Repeat CTH stable.    1) MDS  recent flow cytometry on blood showed 13% blasts  ? transformation to leukemia  given age and comorbidities not a candidate for aggressive therapy  c/w supportive transfusions  keep hgb > 7, platelet count > 50k    2) L hip fracture  ortho following  conservative measures    3) SDH  repeat CTH stable  neurosx following  goal plt > 70-80k, given BM involved this may not be achieved with transfusions   plt count 60,000 today    4) DVT ppx  SCD    further mx as per ICU team

## 2023-05-25 NOTE — CONSULT NOTE ADULT - SUBJECTIVE AND OBJECTIVE BOX
89yF was admitted on 05-23    Patient is a 89y old  Female who presents with a chief complaint of SDH, Pelvic Fracture (25 May 2023 01:03)    HPI:  Ms. Spence is a 89 year old Female with PMH of HTN, HLD, history of leukemia, with persistent thrombocytopenia, presenting after a fall in the shower.  CT at [place] showed a Left Superior and inferior pubic rami fracture. SDH intrahemishere region on CT head.  This morning she denies any pain.  She denies any overt weakness.        Imaging reviewed showed:    ACC: [id number] EXAM:  CT CERVICAL SPINE   ORDERED BY: FAUSTO YAO     ACC: [id number] EXAM:  CT BRAIN   ORDERED BY: FAUSTO YAO     PROCEDURE DATE:  05/23/2023          INTERPRETATION:  CT HEAD, CT CERVICAL SPINE    Clinical information: SDH. Trauma    IMAGING TECHNIQUE:  Noncontrast CT.  Axial Acquisition.  Sagittal and coronal reformations.    COMPARISON:  Head CT is compared to prior study of earlier in the day.    HEAD CT FINDINGS:  HEMORRHAGE:  Thin parafalcine subdural hematoma appears stable measuring   up to 3 mm in thickness. No new areas of intracranial hemorrhage.  BRAIN PARENCHYMA:  Mild to moderate atrophy. Mild periventricular white   matter ischemic changes  No parenchymal mass or mass effect.  VENTRICLES / SHIFT:  No hydrocephalus. No midline shift.  BASAL CISTERNS:  Basal cisterns preserved.  Atherosclerotic   calcifications of the cavernous internal carotid arteries.  CALVARIUM AND EXTRACRANIAL SOFT TISSUES:  No depressed calvarial fracture.  SINUSES, ORBITS, MASTOIDS:  The visualized paranasal sinuses and mastoid   air cells are well aerated.    CERVICAL SPINE FINDINGS:  FRACTURES:  No evidence of an acute cervical spine  fracture.  ALIGNMENT:  Mild straightening of the normal cervical lordosis. No   subluxation.  SPINAL COLUMN:  Vertebral body heights are well-maintained. Moderate   multilevel degenerative disc disease with narrowing of the disc spaces   and endplate degenerative changes/osteophytes.  OTHER: No prevertebral soft tissue swelling.    IMPRESSION:  HEAD CT:  Thin parafalcine subdural hematoma is stable in size and   appearance.  CERVICAL SPINE CT:  No evidence of an acute cervical spine fracture.      ACC: [id number] EXAM:  CT PELVIS ONLY   ORDERED BY: DIEUDONNE ESTEBAN     PROCEDURE DATE:  05/23/2023          INTERPRETATION:  EXAMINATION: CT PELVIS    CLINICAL INDICATION:Fall with left hip pain    COMPARISON: Hip and femur radiographs, same day    TECHNIQUE: CT of the musculoskeletal pelvis was performed without   intravenous contrast.    INTERPRETATION:    : No additional findings.    Lower lumbar spine: Severe facet arthrosis and multilevel lumbar   spondylosis, partially imaged/evaluated.    Bones: There is osteopenia. There is an acute oblique fracture lucency   involving the left inferior pubic ramus with stellate fracture lines   extending into the left ischium. There is minimal step-off at the site of   fracture. There is a suspected nondisplaced fracture of the left superior   pubic ramus near the pubic symphysis.Both hips demonstrate periarticular   osteophytes and mild joint space narrowing. There is moderate osteitis   pubis.    Soft tissues: No large localized hematoma. There is edema and enlargement   of the adductor compartment musculature adjacent to the fracture. There   is extensive vascular atherosclerosis.      IMPRESSION:    1.  Acute fracture of the left inferior pubic ramus with stellate   fracture lines extending into the ischium. Suspect nondisplaced fracture   of the left superior pubic ramus, although evaluation is limited by   osteopenia.    2.  Moderate degenerative arthritis of the hips. Multilevel lower lumbar   spondylosis.    3.  Extensive vascular atherosclerosis.        REVIEW OF SYSTEMS  Constitutional - No fever, No weight loss, No fatigue  HEENT - No eye pain, No visual disturbances, No difficulty hearing, No tinnitus, No vertigo, No neck pain  Respiratory - No cough, No wheezing, No shortness of breath  Cardiovascular - No chest pain, No palpitations  Gastrointestinal - No abdominal pain, No nausea, No vomiting, No diarrhea, No constipation  Genitourinary - No dysuria, No frequency, No hematuria, No incontinence  Neurological - No headaches, No memory loss, No loss of strength, No numbness, No tremors  Skin - No itching, No rashes, No lesions   Endocrine - No temperature intolerance  Musculoskeletal - No joint pain, No joint swelling, No muscle pain  Psychiatric - No depression, No anxiety    VITALS  T(C): 36.7 (05-25-23 @ 07:05), Max: 37.5 (05-24-23 @ 11:00)  HR: 74 (05-25-23 @ 07:05) (73 - 93)  BP: 135/34 (05-25-23 @ 07:05) (85/74 - 164/83)  RR: 17 (05-25-23 @ 07:05) (13 - 23)  SpO2: 100% (05-25-23 @ 07:05) (92% - 100%)  Wt(kg): --    PAST MEDICAL & SURGICAL HISTORY  Macular degeneration    HTN (hypertension)    HLD (hyperlipidemia)    Osteoarthritis    Anxiety    Anemia    MDS (myelodysplastic syndrome)    No significant past surgical history        SOCIAL HISTORY - as per documentation/history  Smoking - None  EtOH - None  Drugs - None    FUNCTIONAL HISTORY  Previously independent, lives alone with 3 steps to enter.     CURRENT FUNCTIONAL STATUS    Bed Mobility: Supine to Sit:     · Level of [place]	minimum assist (75% patients effort)  · Physical Assist/Nonphysical Assist	1 person assist; nonverbal cues (demo/gestures); verbal cues    Bed Mobility Analysis:     · Bed Mobility Limitations	decreased ability to use legs for bridging/pushing  · Impairments Contributing to Impaired Bed Mobility	pain; decreased ROM; decreased strength    Transfer: Sit to Stand:     · Level of [place]	minimum assist (75% patients effort)  · Physical Assist/Nonphysical Assist	1 person assist; nonverbal cues (demo/gestures); verbal cues  · Weight-Bearing Restrictions	weight-bearing as tolerated  · Assistive Device	rolling walker    Transfer: Stand to Sit:     · Level of [place]	minimum assist (75% patients effort)  · Physical Assist/Nonphysical Assist	nonverbal cues (demo/gestures)  · Weight-Bearing Restrictions	weight-bearing as tolerated  · Assistive Device	rolling walker    Sit/Stand Transfer Safety Analysis:     · Impairments Contributing to Impaired Transfers	pain; decreased ROM; decreased strength; impaired balance    Gait Skills:     · Level of [place]	minimum assist (75% patients effort)  · Physical Assist/Nonphysical Assist	1 person assist; nonverbal cues (demo/gestures); verbal cues  · Weight-Bearing Restrictions	weight-bearing as tolerated  · Assistive Device	rolling walker  · Gait Distance	20ft    Gait Analysis:     · Gait Pattern Used	swing-to gait  · Gait Deviations Noted	decreased kim; decreased step length; decreased stride length  · Impairments Contributing to Gait Deviations	pain; decreased ROM; decreased strength; impaired balance      FAMILY HISTORY   FH: CAD (coronary artery disease)    Family history of diabetes mellitus (DM)        RECENT LABS - Reviewed  CBC Full  -  ( 25 May 2023 04:05 )  WBC Count : 25.73 K/uL  RBC Count : 2.58 M/uL  Hemoglobin : 7.1 g/dL  Hematocrit : 21.5 %  Platelet Count - Automated : 60 K/uL  Mean Cell Volume : 83.3 fl  Mean Cell Hemoglobin : 27.5 pg  Mean Cell Hemoglobin Concentration : 33.0 gm/dL  Auto Neutrophil # : 19.35 K/uL  Auto Lymphocyte # : 2.03 K/uL  Auto Monocyte # : 0.23 K/uL  Auto Eosinophil # : 0.00 K/uL  Auto Basophil # : 0.46 K/uL  Auto Neutrophil % : 72.6 %  Auto Lymphocyte % : 7.9 %  Auto Monocyte % : 0.9 %  Auto Eosinophil % : 0.0 %  Auto Basophil % : 1.8 %    05-25    132<L>  |  101  |  23.1<H>  ----------------------------<  104<H>  4.0   |  21.0<L>  |  0.73    Ca    9.4      25 May 2023 04:05  Phos  3.6     05-25  Mg     1.9     05-25    TPro  7.7  /  Alb  3.2<L>  /  TBili  0.3  /  DBili  x   /  AST  28  /  ALT  39  /  AlkPhos  105  05-23        ALLERGIES  Proplene Glycol (Urticaria)  Levaquin (Other)  steroids - numbness to face (Unknown)  COBALT (Urticaria)  Balsam of [place] (Urticaria)      MEDICATIONS   acetaminophen     Tablet .. 975 milliGRAM(s) Oral every 6 hours PRN  amitriptyline 25 milliGRAM(s) Oral every 12 hours  atorvastatin 40 milliGRAM(s) Oral at bedtime  chlorhexidine 2% Cloths 1 Application(s) Topical daily  lidocaine   4% Patch 1 Patch Transdermal daily  melatonin 5 milliGRAM(s) Oral at bedtime  metoprolol tartrate 12.5 milliGRAM(s) Oral every 12 hours      ----------------------------------------------------------------------------------------  PHYSICAL EXAM  Constitutional - NAD, Comfortable  HEENT - NCAT, EOMI  Neck - Supple, No limited ROM  Chest - Breathing comfortably, No wheezing  Cardiovascular - No cyanosis    Abdomen - Soft   Extremities - No C/C/E, No calf tenderness   Neurologic Exam -                    Cognitive - Awake, Alert, AAO to self, place, date, year, situation     Communication - Fluent, No dysarthria     Cranial Nerves - CN 2-12 intact     Motor - 4+/5 throughout         Sensory - Intact to LT     Reflexes - No clonus   Psychiatric - Mood stable, Affect WNL  ----------------------------------------------------------------------------------------  ASSESSMENT/PLAN  89yFemale with functional deficits after Fall  SDH -  evaluated by neurosurgery  Left inferior pubic ramus  - evaluated by orthopedics, WBAT   Pain - Tylenol  DVT PPX - SCDs  Rehab/Impaired mobility - Continue PT/OT.  Monitor thrombocytopenia, precautions with high resistance, fall precautions.  Recommend ACUTE inpatient rehabilitation for the functional deficits consisting of 3 hours of therapy/day & 24 hour RN/daily PMR physician for comorbid medical management. Patient will be able to tolerate 3 hours a day.    Will continue to follow. Functional progress will determine ongoing rehab dispo recommendations, which may change.    Continue bedside therapy as well as OOB throughout the day with mobilization by staff to maintain cardiopulmonary function and prevention of secondary complications related to debility.

## 2023-05-26 LAB
ANION GAP SERPL CALC-SCNC: 9 MMOL/L — SIGNIFICANT CHANGE UP (ref 5–17)
BASOPHILS # BLD AUTO: 0 K/UL — SIGNIFICANT CHANGE UP (ref 0–0.2)
BASOPHILS # BLD AUTO: 0.24 K/UL — HIGH (ref 0–0.2)
BASOPHILS NFR BLD AUTO: 0 % — SIGNIFICANT CHANGE UP (ref 0–2)
BASOPHILS NFR BLD AUTO: 0.9 % — SIGNIFICANT CHANGE UP (ref 0–2)
BLASTS # FLD: 3.5 % — HIGH (ref 0–0)
BUN SERPL-MCNC: 15.6 MG/DL — SIGNIFICANT CHANGE UP (ref 8–20)
CALCIUM SERPL-MCNC: 9.6 MG/DL — SIGNIFICANT CHANGE UP (ref 8.4–10.5)
CHLORIDE SERPL-SCNC: 100 MMOL/L — SIGNIFICANT CHANGE UP (ref 96–108)
CO2 SERPL-SCNC: 23 MMOL/L — SIGNIFICANT CHANGE UP (ref 22–29)
CREAT SERPL-MCNC: 0.66 MG/DL — SIGNIFICANT CHANGE UP (ref 0.5–1.3)
EGFR: 83 ML/MIN/1.73M2 — SIGNIFICANT CHANGE UP
EOSINOPHIL # BLD AUTO: 0 K/UL — SIGNIFICANT CHANGE UP (ref 0–0.5)
EOSINOPHIL # BLD AUTO: 0.25 K/UL — SIGNIFICANT CHANGE UP (ref 0–0.5)
EOSINOPHIL NFR BLD AUTO: 0 % — SIGNIFICANT CHANGE UP (ref 0–6)
EOSINOPHIL NFR BLD AUTO: 1 % — SIGNIFICANT CHANGE UP (ref 0–6)
GLUCOSE SERPL-MCNC: 95 MG/DL — SIGNIFICANT CHANGE UP (ref 70–99)
HCT VFR BLD CALC: 23.3 % — LOW (ref 34.5–45)
HGB BLD-MCNC: 7.7 G/DL — LOW (ref 11.5–15.5)
LYMPHOCYTES # BLD AUTO: 17 % — SIGNIFICANT CHANGE UP (ref 13–44)
LYMPHOCYTES # BLD AUTO: 20.2 % — SIGNIFICANT CHANGE UP (ref 13–44)
LYMPHOCYTES # BLD AUTO: 4.2 K/UL — HIGH (ref 1–3.3)
LYMPHOCYTES # BLD AUTO: 5.38 K/UL — HIGH (ref 1–3.3)
MAGNESIUM SERPL-MCNC: 2 MG/DL — SIGNIFICANT CHANGE UP (ref 1.6–2.6)
MCHC RBC-ENTMCNC: 27.9 PG — SIGNIFICANT CHANGE UP (ref 27–34)
MCHC RBC-ENTMCNC: 33 GM/DL — SIGNIFICANT CHANGE UP (ref 32–36)
MCV RBC AUTO: 84.4 FL — SIGNIFICANT CHANGE UP (ref 80–100)
METAMYELOCYTES # FLD: 4.4 % — HIGH (ref 0–0)
MONOCYTES # BLD AUTO: 0.45 K/UL — SIGNIFICANT CHANGE UP (ref 0–0.9)
MONOCYTES # BLD AUTO: 1.24 K/UL — HIGH (ref 0–0.9)
MONOCYTES NFR BLD AUTO: 1.7 % — LOW (ref 2–14)
MONOCYTES NFR BLD AUTO: 5 % — SIGNIFICANT CHANGE UP (ref 2–14)
MYELOCYTES NFR BLD: 2.6 % — HIGH (ref 0–0)
NEUTROPHILS # BLD AUTO: 16.82 K/UL — HIGH (ref 1.8–7.4)
NEUTROPHILS # BLD AUTO: 17.75 K/UL — HIGH (ref 1.8–7.4)
NEUTROPHILS NFR BLD AUTO: 58 % — SIGNIFICANT CHANGE UP (ref 43–77)
NEUTROPHILS NFR BLD AUTO: 65.8 % — SIGNIFICANT CHANGE UP (ref 43–77)
NEUTS BAND # BLD: 0.9 % — SIGNIFICANT CHANGE UP (ref 0–8)
NRBC # BLD: 1 /100 — HIGH (ref 0–0)
PHOSPHATE SERPL-MCNC: 3.8 MG/DL — SIGNIFICANT CHANGE UP (ref 2.4–4.7)
PLAT MORPH BLD: NORMAL — SIGNIFICANT CHANGE UP
PLATELET # BLD AUTO: 56 K/UL — LOW (ref 150–400)
POTASSIUM SERPL-MCNC: 3.9 MMOL/L — SIGNIFICANT CHANGE UP (ref 3.5–5.3)
POTASSIUM SERPL-SCNC: 3.9 MMOL/L — SIGNIFICANT CHANGE UP (ref 3.5–5.3)
RBC # BLD: 2.76 M/UL — LOW (ref 3.8–5.2)
RBC # FLD: 15.7 % — HIGH (ref 10.3–14.5)
RBC BLD AUTO: ABNORMAL
SODIUM SERPL-SCNC: 132 MMOL/L — LOW (ref 135–145)
WBC # BLD: 26.61 K/UL — HIGH (ref 3.8–10.5)
WBC # FLD AUTO: 26.61 K/UL — HIGH (ref 3.8–10.5)

## 2023-05-26 PROCEDURE — 99232 SBSQ HOSP IP/OBS MODERATE 35: CPT

## 2023-05-26 PROCEDURE — 99497 ADVNCD CARE PLAN 30 MIN: CPT | Mod: 25

## 2023-05-26 PROCEDURE — 99223 1ST HOSP IP/OBS HIGH 75: CPT

## 2023-05-26 PROCEDURE — 99233 SBSQ HOSP IP/OBS HIGH 50: CPT

## 2023-05-26 RX ORDER — QUETIAPINE FUMARATE 200 MG/1
12.5 TABLET, FILM COATED ORAL AT BEDTIME
Refills: 0 | Status: DISCONTINUED | OUTPATIENT
Start: 2023-05-26 | End: 2023-06-08

## 2023-05-26 RX ADMIN — Medication 12.5 MILLIGRAM(S): at 04:55

## 2023-05-26 RX ADMIN — ENOXAPARIN SODIUM 40 MILLIGRAM(S): 100 INJECTION SUBCUTANEOUS at 20:48

## 2023-05-26 RX ADMIN — QUETIAPINE FUMARATE 12.5 MILLIGRAM(S): 200 TABLET, FILM COATED ORAL at 20:44

## 2023-05-26 RX ADMIN — Medication 25 MILLIGRAM(S): at 04:55

## 2023-05-26 RX ADMIN — Medication 5 MILLIGRAM(S): at 20:44

## 2023-05-26 RX ADMIN — Medication 25 MILLIGRAM(S): at 18:42

## 2023-05-26 RX ADMIN — ATORVASTATIN CALCIUM 40 MILLIGRAM(S): 80 TABLET, FILM COATED ORAL at 20:44

## 2023-05-26 RX ADMIN — Medication 12.5 MILLIGRAM(S): at 18:42

## 2023-05-26 NOTE — CONSULT NOTE ADULT - CONVERSATION DETAILS
Daughter Izzy states she is the HCP and has forms indicating so.    She was informed by mother's oncologist that mother is not a candidate for txs for leukemia.  She would like to continue transfusions, which she gets about every other week.  She was given a number for Palliative Services.  Informed Izzy if they decide transfusions are not helping or becoming a burden, then can consider hospice services.   Izzy does not feels they are ready for that.   She was receptive to being given information for future reference.    Izzy states mother with paperwork done years ago stating she is a DNR.  Informed her mother is currently Full Code.  Can put in effect and complete paperwork ( MOLST) .  She wishes to hold off, does not feel it is appropriate yet.   Recommend she review mother's Living Will and discuss  her  wishes regarding LST

## 2023-05-26 NOTE — PROGRESS NOTE ADULT - ATTENDING COMMENTS
Patient seen and examined on AM rounds  Awake, alert  Mildly Confused  Hemodynamically stable  afebrile    - Anemia (preexisting) stable over last 24 hours  - Mild hyponatremia stable over last 24 hours  - Palliative care consult given underlying chronic medical issues  - PT consult for safe discharge planning

## 2023-05-26 NOTE — PROGRESS NOTE ADULT - ASSESSMENT
89 year old F, PMH of HTN, HLD, history of leukemia, with persistent thrombocytopenia (Plt 60 on Feb 2023), presenting after a fall in the shower, c/o buttock pain, unknown headstrike/LOC. CT at [place] showed a Left Superior and inferior pubic rami fracture. SDH intrahemishere region on CT head. Repeat CTH stable. Neuro intact.     dvt prophylaxis   reg diet  WBAT  PT/OT - Acute Rehab candidate  Pain is controlled  f/u Neurosurgery as outpatient  CBC in AM  Monitor delirium- maintain sleep/wake cycle, avoid deliriogenic medications  d/c planning.

## 2023-05-26 NOTE — CONSULT NOTE ADULT - SUBJECTIVE AND OBJECTIVE BOX
HPI:  89 year old F, PMH of HTN, HLD, history of leukemia, remission? with persistent thrombocytopenia, presenting after a fall in the shower, c/o buttock pain, unsure if she had headstrike, unsure if she had loss of consciousness denies any additional complaints, no fever no chills no chest pain.  Denies falling frequently and denies palpitations or syncope.   (23 May 2023 18:48)      PERTINENT PMH REVIEWED: Yes     PAST MEDICAL & SURGICAL HISTORY:  Macular degeneration      HTN (hypertension)      HLD (hyperlipidemia)      Osteoarthritis      Anxiety      Anemia      MDS (myelodysplastic syndrome)      No significant past surgical history      SOCIAL HISTORY:                      Substance history:  no hx                     Admitted from:  home                       Mosque/spirituality: UNK                    Cultural concerns:    Baseline ADLs (prior to admission):  Independent/ Dependent                        Surrogate/HCP/Guardian: Daughter Izzy Spence    FAMILY HISTORY:  FH: CAD (coronary artery disease)    Family history of diabetes mellitus (DM)        Allergies    Proplene Glycol (Urticaria)  steroids - numbness to face (Unknown)  COBALT (Urticaria)  Balsam of [place] (Urticaria)    Intolerances    Levaquin (Other)      http://npcrc.org/files/news/palliative_performance_scale_ppsv2.pdf    Present Symptoms:   Dyspnea:  No Mild Moderate Severe  Nausea/Vomiting:  No  Yes  Anxiety:   No  Yes  Depression: No Yes Unable  Fatigue: Yes No Unable  Loss of appetite: Yes No  N/A  NPO  Constipation:  Not reported   Yes    Pain:  No  No signs            Location            Character            Duration            Factors            Severity            Effect    Pain AD Score:  [email or web address]/[email or web address].pdf (press ctrl + left click to view)    Review of Systems: Reviewed    All other ROS negative  Unable to obtain due to poor mentation historian      MEDICATIONS  (STANDING):  amitriptyline 25 milliGRAM(s) Oral every 12 hours  atorvastatin 40 milliGRAM(s) Oral at bedtime  enoxaparin Injectable 40 milliGRAM(s) SubCutaneous every 24 hours  lidocaine   4% Patch 1 Patch Transdermal daily  melatonin 5 milliGRAM(s) Oral at bedtime  metoprolol tartrate 12.5 milliGRAM(s) Oral every 12 hours  QUEtiapine 12.5 milliGRAM(s) Oral at bedtime    MEDICATIONS  (PRN):  acetaminophen     Tablet .. 975 milliGRAM(s) Oral every 6 hours PRN Temp greater or equal to 38C (100.4F), Mild Pain (1 - 3)      PHYSICAL EXAM:    Vital Signs Last 24 Hrs  T(C): 36.8 (26 May 2023 04:43), Max: 37.1 (25 May 2023 23:00)  T(F): 98.2 (26 May 2023 04:43), Max: 98.8 (25 May 2023 23:00)  HR: 74 (26 May 2023 04:43) (72 - 83)  BP: 130/69 (26 May 2023 04:43) (113/60 - 155/115)  BP(mean): 78 (25 May 2023 18:00) (74 - 129)  RR: 18 (26 May 2023 04:43) (15 - 23)  SpO2: 99% (26 May 2023 04:43) (93% - 100%)    Parameters below as of 26 May 2023 04:43  Patient On (Oxygen Delivery Method): room air        Karnofsky     %  General:    HEENT: NCAT      (  )  ET tube   (    ) NGT  Lungs: comfortable  CV:   GI:           (  )  PEG  MSK: normal   weak  chair/bedbound  Neuro:   Skin: warm dry  Psych:    LABS:                        7.7    26.61 )-----------( 56       ( 26 May 2023 05:49 )             23.3     05-26    132<L>  |  100  |  15.6  ----------------------------<  95  3.9   |  23.0  |  0.66    Ca    9.6      26 May 2023 05:49  Phos  3.8     05-26  Mg     2.0     05-26          I&O's Summary    25 May 2023 07:01  -  26 May 2023 07:00  --------------------------------------------------------  IN: 360 mL / OUT: 1450 mL / NET: -1090 mL        RADIOLOGY & ADDITIONAL STUDIES:  < from: CT Head No Cont (05.23.23 @ 12:41) >  ACC: [id number] EXAM:  CT BRAIN   ORDERED BY: DIEUDONNE ESTEBAN     PROCEDURE DATE:  05/23/2023          INTERPRETATION:  Clinical indication: Dizziness and fall.    Multiple axial sections were performed from the base of vertex without   contrast enhancement. Coronal and sagittal reconstructions were performed    This exam is compared with prior head CT performed on February 25, 2013.    Increased parenchymal volume loss and chronic microvascular ischemic   changes are identified when compared with the prior exam.    Extra-axial high attenuation is identified along the interhemispheric   region. This is likely compatible with a small acute subdural hematoma.   This finding measures approximately 0.5 cm widest diameter..    Evaluation of theosseous structures with the appropriate window appears   unremarkable    The visualized paranasal sinuses mastoid and middle ear regions appear   clear.    Small extra-axial sebaceous cyst is seen involving the high left parietal   region.    IMPRESSION: Small acute subdural hematoma suspected as described above.    Findings discussed with Dr. Esteban on May 23, 2023 at 12:33 PM  with   read back.    < end of copied text >    < from: CT Pelvis No Cont (05.23.23 @ 12:43) >      IMPRESSION:    1.  Acute fracture of the left inferior pubic ramus with stellate   fracture lines extending into the ischium. Suspect nondisplaced fracture   of the left superior pubic ramus, although evaluation is limited by   osteopenia.    2.  Moderate degenerative arthritis of the hips. Multilevel lower lumbar   spondylosis.    3.  Extensivevascular atherosclerosis.    --- End of Report ---    < end of copied text >            ADVANCE DIRECTIVES/TREATMENT PREFERENCES:  Currently Full code, all aggressive measures desired          HPI:  89 year old F, PMH of HTN, HLD, history of leukemia, remission? with persistent thrombocytopenia, presenting after a fall in the shower, c/o buttock pain, unsure if she had headstrike, unsure if she had loss of consciousness denies any additional complaints, no fever no chills no chest pain.  Denies falling frequently and denies palpitations or syncope.   (23 May 2023 18:48)      PERTINENT PMH REVIEWED: Yes     PAST MEDICAL & SURGICAL HISTORY:  Macular degeneration      HTN (hypertension)      HLD (hyperlipidemia)      Osteoarthritis      Anxiety      Anemia      MDS (myelodysplastic syndrome)      No significant past surgical history      SOCIAL HISTORY:                      Substance history:  no hx                     Admitted from:  home                       Buddhism/spirituality: UNK                    Cultural concerns:    Baseline ADLs (prior to admission):  Independent                        Surrogate/HCP/Guardian: Daughter Izzy Spence    FAMILY HISTORY:  FH: CAD (coronary artery disease)    Family history of diabetes mellitus (DM)        Allergies    Proplene Glycol (Urticaria)  steroids - numbness to face (Unknown)  COBALT (Urticaria)  Balsam of [place] (Urticaria)    Intolerances    Levaquin (Other)      http://npcrc.org/files/news/palliative_performance_scale_ppsv2.pdf    Present Symptoms:   Dyspnea:  No   Nausea/Vomiting:  No  Anxiety:   No    Depression: No  Fatigue: Yes No Unable  Loss of appetite: No   Constipation:  No    Pain:  No              Location            Character            Duration            Factors            Severity            Effect    Pain AD Score:  [email or web address].[email or web address].[email or web address]/cog/painad.pdf (press ctrl + left click to view)    Review of Systems: Reviewed    All other ROS negative      MEDICATIONS  (STANDING):  amitriptyline 25 milliGRAM(s) Oral every 12 hours  atorvastatin 40 milliGRAM(s) Oral at bedtime  enoxaparin Injectable 40 milliGRAM(s) SubCutaneous every 24 hours  lidocaine   4% Patch 1 Patch Transdermal daily  melatonin 5 milliGRAM(s) Oral at bedtime  metoprolol tartrate 12.5 milliGRAM(s) Oral every 12 hours  QUEtiapine 12.5 milliGRAM(s) Oral at bedtime    MEDICATIONS  (PRN):  acetaminophen     Tablet .. 975 milliGRAM(s) Oral every 6 hours PRN Temp greater or equal to 38C (100.4F), Mild Pain (1 - 3)      PHYSICAL EXAM:    Vital Signs Last 24 Hrs  T(C): 36.8 (26 May 2023 04:43), Max: 37.1 (25 May 2023 23:00)  T(F): 98.2 (26 May 2023 04:43), Max: 98.8 (25 May 2023 23:00)  HR: 74 (26 May 2023 04:43) (72 - 83)  BP: 130/69 (26 May 2023 04:43) (113/60 - 155/115)  BP(mean): 78 (25 May 2023 18:00) (74 - 129)  RR: 18 (26 May 2023 04:43) (15 - 23)  SpO2: 99% (26 May 2023 04:43) (93% - 100%)    Parameters below as of 26 May 2023 04:43  Patient On (Oxygen Delivery Method): room air    Karnofsky 40   %  General:  Elderly woman awake alert NAD  HEENT: NCAT     mmm  Lungs: comfortable  CV: RR  GI: soft NTND  MSK:  no contractures  Neuro: no focal deficits  Skin: warm dry  Psych: calm pleasant    LABS:                        7.7    26.61 )-----------( 56       ( 26 May 2023 05:49 )             23.3     05-26    132<L>  |  100  |  15.6  ----------------------------<  95  3.9   |  23.0  |  0.66    Ca    9.6      26 May 2023 05:49  Phos  3.8     05-26  Mg     2.0     05-26          I&O's Summary    25 May 2023 07:01  -  26 May 2023 07:00  --------------------------------------------------------  IN: 360 mL / OUT: 1450 mL / NET: -1090 mL        RADIOLOGY & ADDITIONAL STUDIES:  < from: CT Head No Cont (05.23.23 @ 12:41) >  ACC: [id number] EXAM:  CT BRAIN   ORDERED BY: DIEUDONNE ESTEBAN     PROCEDURE DATE:  05/23/2023          INTERPRETATION:  Clinical indication: Dizziness and fall.    Multiple axial sections were performed from the base of vertex without   contrast enhancement. Coronal and sagittal reconstructions were performed    This exam is compared with prior head CT performed on February 25, 2013.    Increased parenchymal volume loss and chronic microvascular ischemic   changes are identified when compared with the prior exam.    Extra-axial high attenuation is identified along the interhemispheric   region. This is likely compatible with a small acute subdural hematoma.   This finding measures approximately 0.5 cm widest diameter..    Evaluation of theosseous structures with the appropriate window appears   unremarkable    The visualized paranasal sinuses mastoid and middle ear regions appear   clear.    Small extra-axial sebaceous cyst is seen involving the high left parietal   region.    IMPRESSION: Small acute subdural hematoma suspected as described above.    Findings discussed with Dr. Esteban on May 23, 2023 at 12:33 PM  with   read back.    < end of copied text >    < from: CT Pelvis No Cont (05.23.23 @ 12:43) >      IMPRESSION:    1.  Acute fracture of the left inferior pubic ramus with stellate   fracture lines extending into the ischium. Suspect nondisplaced fracture   of the left superior pubic ramus, although evaluation is limited by   osteopenia.    2.  Moderate degenerative arthritis of the hips. Multilevel lower lumbar   spondylosis.    3.  Extensivevascular atherosclerosis.    --- End of Report ---    < end of copied text >      ADVANCE DIRECTIVES/TREATMENT PREFERENCES:  Currently Full code, all aggressive measures desired

## 2023-05-26 NOTE — PROGRESS NOTE ADULT - SUBJECTIVE AND OBJECTIVE BOX
ANITRA RINCON    [id number]    History:      Patient seen and examined  being followed for non complicated Left pubic rami fx   Downgraded from the SICU  No acute overnight events reported  Reportedly is participating in physical therapy.   No reports of nausea, vomiting, chest pain, shortness of breath, abdominal pain or fever. No new complaints. No acute motor or sensory changes are reported.    Vital Signs Last 24 Hrs  T(C): 36.8 (26 May 2023 04:43), Max: 37.1 (25 May 2023 23:00)  T(F): 98.2 (26 May 2023 04:43), Max: 98.8 (25 May 2023 23:00)  HR: 74 (26 May 2023 04:43) (68 - 83)  BP: 130/69 (26 May 2023 04:43) (113/60 - 155/115)  BP(mean): 78 (25 May 2023 18:00) (72 - 129)  RR: 18 (26 May 2023 04:43) (15 - 23)  SpO2: 99% (26 May 2023 04:43) (93% - 100%)    Parameters below as of 26 May 2023 04:43  Patient On (Oxygen Delivery Method): room air      I&O's Summary    25 May 2023 07:01  -  26 May 2023 07:00  --------------------------------------------------------  IN: 360 mL / OUT: 1450 mL / NET: -1090 mL                              7.7    26.61 )-----------( 56       ( 26 May 2023 05:49 )             23.3     05-26    132<L>  |  100  |  15.6  ----------------------------<  95  3.9   |  23.0  |  0.66    Ca    9.6      26 May 2023 05:49  Phos  3.8     05-26  Mg     2.0     05-26        MEDICATIONS  (STANDING):  amitriptyline 25 milliGRAM(s) Oral every 12 hours  atorvastatin 40 milliGRAM(s) Oral at bedtime  enoxaparin Injectable 40 milliGRAM(s) SubCutaneous every 24 hours  lidocaine   4% Patch 1 Patch Transdermal daily  melatonin 5 milliGRAM(s) Oral at bedtime  metoprolol tartrate 12.5 milliGRAM(s) Oral every 12 hours    MEDICATIONS  (PRN):  acetaminophen     Tablet .. 975 milliGRAM(s) Oral every 6 hours PRN Temp greater or equal to 38C (100.4F), Mild Pain (1 - 3)      Physical exam:     No distress  Alert  oriented to self/place  non labored breathing   abdomen is soft/non tender  extremities are warm, scd in place, no gross edema/deformity, no gross motor or sensory deficits  Sensation to light touch is grossly intact without focal deficit and is symmetric bilaterally. No calf tenderness.   Motor function distally is 5/5. No foot drop. Capillary refill is less than 2 seconds. No cyanosis.    Primary Orthopedic Assessment:  • uncomplicated left pubic rami fracture        •   Plan:   • WBAt  - No need for operative intervention  - Orthopedics to sign off, patient welcomed to follow up with Dr Sykes ([facility]) post discharge

## 2023-05-26 NOTE — CONSULT NOTE ADULT - TIME BILLING
Time spent with review of chart documents, labs, imaging. Direct patient assessment,  formulation of care plan. Discussion with  Interdisciplinary  team    including ACP  __18___minutes

## 2023-05-26 NOTE — PROGRESS NOTE ADULT - SUBJECTIVE AND OBJECTIVE BOX
Subjective: Patient downgraded from SICU yesterday evening. VSS. No acute events overnight. Denies chest pain, sob, nausea, vomiting, diarrhea.       MEDICATIONS  (STANDING):  amitriptyline 25 milliGRAM(s) Oral every 12 hours  atorvastatin 40 milliGRAM(s) Oral at bedtime  enoxaparin Injectable 40 milliGRAM(s) SubCutaneous every 24 hours  lidocaine   4% Patch 1 Patch Transdermal daily  melatonin 5 milliGRAM(s) Oral at bedtime  metoprolol tartrate 12.5 milliGRAM(s) Oral every 12 hours    MEDICATIONS  (PRN):  acetaminophen     Tablet .. 975 milliGRAM(s) Oral every 6 hours PRN Temp greater or equal to 38C (100.4F), Mild Pain (1 - 3)      Proplene Glycol (Urticaria)  Levaquin (Other)  steroids - numbness to face (Unknown)  COBALT (Urticaria)  Balsam of [place] (Urticaria)        Objective:     ICU Vital Signs Last 24 Hrs  T(C): 36.8 (26 May 2023 04:43), Max: 37.1 (25 May 2023 23:00)  T(F): 98.2 (26 May 2023 04:43), Max: 98.8 (25 May 2023 23:00)  HR: 74 (26 May 2023 04:43) (68 - 83)  BP: 130/69 (26 May 2023 04:43) (113/60 - 155/115)  BP(mean): 78 (25 May 2023 18:00) (54 - 129)  ABP: --  ABP(mean): --  RR: 18 (26 May 2023 04:43) (13 - 23)  SpO2: 99% (26 May 2023 04:43) (93% - 100%)    O2 Parameters below as of 26 May 2023 04:43  Patient On (Oxygen Delivery Method): room air            I&O's Detail    24 May 2023 07:01  -  25 May 2023 07:00  --------------------------------------------------------  IN:    Oral Fluid: 500 mL    Platelets - Single Donor: 427 mL    PRBCs (Packed Red Blood Cells): 364 mL    sodium chloride 0.9%: 120 mL  Total IN: 1411 mL    OUT:    Voided (mL): 1150 mL  Total OUT: 1150 mL    Total NET: 261 mL      25 May 2023 07:01  -  26 May 2023 04:54  --------------------------------------------------------  IN:    Oral Fluid: 360 mL  Total IN: 360 mL    OUT:    Voided (mL): 750 mL  Total OUT: 750 mL    Total NET: -390 mL          Physical Exam:  General: NAD. Lying comfortably in bed.   HEENT: NCAT. Neck supple. EOMI.   Respiratory: Non labored breathing.   Cardio: RRR  Abdomen: Soft, non-tender, non-distended. No guarding or rebound.   Extremities: No clubbing or cyanosis.   Neuro: A&O x 3. CNs II-XII grossly intact.                           7.1    25.73 )-----------( 60       ( 25 May 2023 04:05 )             21.5       05-25    132<L>  |  101  |  23.1<H>  ----------------------------<  104<H>  4.0   |  21.0<L>  |  0.73    Ca    9.4      25 May 2023 04:05  Phos  3.6     05-25  Mg     1.9     05-25

## 2023-05-26 NOTE — PROGRESS NOTE ADULT - ASSESSMENT
89 yo F known patient of Dr Torres at [facility] , h/o MDS on Aranesp last on 5/11 ans supportive transfusions , presenting after a fall in the shower, c/o buttock pain, unknown headstrike/LOC. CT at [place] showed a Left Superior and inferior pubic rami fracture. SDH intrahemishere region on CT head. Repeat CTH stable.    1) MDS  recent flow cytometry on blood showed 13% blasts  ? transformation to leukemia  given age and comorbidities not a candidate for aggressive therapy  c/w supportive transfusions  keep hgb > 7, platelet count > 50k    2) L hip fracture  ortho following  conservative measures  plan for rehab    3) SDH  repeat CTH stable  neurosx following  goal plt > 70-80k, given BM involved this may not be achieved with transfusions   plt count 56,000 today    4) DVT ppx  lovenox as long as plt count > 30,000    further mx as per primary team

## 2023-05-26 NOTE — CONSULT NOTE ADULT - ASSESSMENT
89yr woman hx MDS, HTN, HLD admitted with L pubic rami fracture with leukemia.  Palliative Care consulted to assist wit [facility]    L Pubic Rami fracture  no sx  pain control  PMR rec Acute Rehab    MDS  possible progression to Leukemia  noted Oncology input - not a candidate for  aggressive tx  supportive transfusions    SDH  Neursx - no sx    Encounter for Palliative Care     89yr woman hx MDS, HTN, HLD admitted with L pubic rami fracture with leukemia.  Palliative Care consulted to assist wit [facility]    L Pubic Rami fracture  pain control - denies pain  PMR rec Acute Rehab    MDS  possible progression to Leukemia  noted Oncology input - not a candidate for  aggressive tx  Daughter wishes to continue with  transfusions    SDH  Neursx - no sx    Encounter for Palliative Care  Palliative Care consulted to assist in goals of care  Patient alert with fair understanding of her condition.  She states she has all paperwork in her safe regarding her advance directives but did not elaborate.    Spoke to daughter Izzy.  See [facility] above for details.   1.  Izzy states she is the HCP - has paperwork to that effect  2. Wishes information on outpatient Palliative Services.   3. Though mother is not a candidate for txs for her leukemia, she wishes for her to continue with transfusions. Hence not ready for hospice  4  She states mother has a Living Will stating that she is a DNR, but does not feel it is currently the time to put into effect    Will request Palliative SW to forward outpatient Palliative Services to daughter via email.  Daughter was also recepive to hospice services if they decide to stop transfusions.   Daughter appreciative of information.

## 2023-05-26 NOTE — PROGRESS NOTE ADULT - SUBJECTIVE AND OBJECTIVE BOX
89 yo F known patient of Dr Torres at [facility] , h/o MDS on Aranesp last on 5/11 ans supportive transfusions , presenting after a fall in the shower, c/o buttock pain, unknown headstrike/LOC. CT at [place] showed a Left Superior and inferior pubic rami fracture. SDH intrahemishere region on CT head. Repeat CTH stable.    now on floors, afebrile  confused, delirium on 1:1 obv      MEDICATIONS  (STANDING):  amitriptyline 25 milliGRAM(s) Oral every 12 hours  atorvastatin 40 milliGRAM(s) Oral at bedtime  enoxaparin Injectable 40 milliGRAM(s) SubCutaneous every 24 hours  lidocaine   4% Patch 1 Patch Transdermal daily  melatonin 5 milliGRAM(s) Oral at bedtime  metoprolol tartrate 12.5 milliGRAM(s) Oral every 12 hours  QUEtiapine 12.5 milliGRAM(s) Oral at bedtime    ICU Vital Signs Last 24 Hrs  T(C): 36.8 (26 May 2023 04:43), Max: 37.1 (25 May 2023 23:00)  T(F): 98.2 (26 May 2023 04:43), Max: 98.8 (25 May 2023 23:00)  HR: 74 (26 May 2023 04:43) (68 - 83)  BP: 130/69 (26 May 2023 04:43) (113/60 - 155/115)  BP(mean): 78 (25 May 2023 18:00) (72 - 129)  ABP: --  ABP(mean): --  RR: 18 (26 May 2023 04:43) (15 - 23)  SpO2: 99% (26 May 2023 04:43) (93% - 100%)    O2 Parameters below as of 26 May 2023 04:43  Patient On (Oxygen Delivery Method): room air    Gen - alert and oriented  Heart - S1S2 RRR  Lung - Dec BS B/L  Abd - soft ND NT  eXT - NO EDEMA                          7.7    26.61 )-----------( 56       ( 26 May 2023 05:49 )             23.3                         7.1    25.73 )-----------( 60       ( 25 May 2023 04:05 )             21.5     05-26    132<L>  |  100  |  15.6  ----------------------------<  95  3.9   |  23.0  |  0.66    Ca    9.6      26 May 2023 05:49  Phos  3.8     05-26  Mg     2.0     05-26 05-25    132<L>  |  101  |  23.1<H>  ----------------------------<  104<H>  4.0   |  21.0<L>  |  0.73    Ca    9.4      25 May 2023 04:05  Phos  3.6     05-25  Mg     1.9     05-25    TPro  7.7  /  Alb  3.2<L>  /  TBili  0.3  /  DBili  x   /  AST  28  /  ALT  39  /  AlkPhos  105  05-23

## 2023-05-26 NOTE — CONSULT NOTE ADULT - CONSULT REASON
SDH, Fracture
SDH
History of Leukemia - Outpatient services were being set up.
left pubic rami fracture, left ischium fracture
Anemia

## 2023-05-26 NOTE — PROGRESS NOTE ADULT - SUBJECTIVE AND OBJECTIVE BOX
Patient confused, on 1:1.  Has no insight into hospitalization.     FUNCTIONAL PROGRESS  5/24 PT  Bed Mobility: Supine to Sit:   · Level of [place]	minimum assist (75% patients effort)  · Physical Assist/Nonphysical Assist	1 person assist; nonverbal cues (demo/gestures); verbal cues    Bed Mobility Analysis:   · Bed Mobility Limitations	decreased ability to use legs for bridging/pushing  · Impairments Contributing to Impaired Bed Mobility	pain; decreased ROM; decreased strength    Transfer: Sit to Stand:   · Level of [place]	minimum assist (75% patients effort)  · Physical Assist/Nonphysical Assist	1 person assist; nonverbal cues (demo/gestures); verbal cues  · Weight-Bearing Restrictions	weight-bearing as tolerated  · Assistive Device	rolling walker    Transfer: Stand to Sit:   · Level of [place]	minimum assist (75% patients effort)  · Physical Assist/Nonphysical Assist	nonverbal cues (demo/gestures)  · Weight-Bearing Restrictions	weight-bearing as tolerated  · Assistive Device	rolling walker    Sit/Stand Transfer Safety Analysis:   · Impairments Contributing to Impaired Transfers	pain; decreased ROM; decreased strength; impaired balance    Gait Skills:   · Level of [place]	minimum assist (75% patients effort)  · Physical Assist/Nonphysical Assist	1 person assist; nonverbal cues (demo/gestures); verbal cues  · Weight-Bearing Restrictions	weight-bearing as tolerated  · Assistive Device	rolling walker  · Gait Distance	20ft    Gait Analysis:     · Gait Pattern Used	swing-to gait  · Gait Deviations Noted	decreased kim; decreased step length; decreased stride length  · Impairments Contributing to Gait Deviations	pain; decreased ROM; decreased strength; impaired balance    Stair Negotiation:   · Level of [place]	TBA    5/24 OT  Bathing Training:   · Level of [place]	minimum assist (75% patients effort)  · Physical Assist/Nonphysical Assist	1 person assist    Upper Body Dressing Training:   · Level of [place]	minimum assist (75% patients effort); to don gown  · Physical Assist/Nonphysical Assist	1 person assist    Lower Body Dressing Training:   · Level of [place]	maximum assist (25% patients effort); to don socks  · Physical Assist/Nonphysical Assist	1 person assist    Toilet Hygiene Training:   · Level of [place]	maximum assist (25% patients effort); secondary to purewick  · Physical Assist/Nonphysical Assist	1 person assist    Grooming Training:   · Level of [place]	supervision    Eating/Self-Feeding Training:   · Level of [place]	supervision  · Physical Assist/Nonphysical Assist	supervision      VITALS  T(C): 36.8 (05-26-23 @ 04:43), Max: 37.1 (05-25-23 @ 23:00)  HR: 74 (05-26-23 @ 04:43) (68 - 83)  BP: 130/69 (05-26-23 @ 04:43) (113/60 - 155/115)  RR: 18 (05-26-23 @ 04:43) (15 - 23)  SpO2: 99% (05-26-23 @ 04:43) (93% - 100%)  Wt(kg): --    MEDICATIONS   acetaminophen     Tablet .. 975 milliGRAM(s) every 6 hours PRN  amitriptyline 25 milliGRAM(s) every 12 hours  atorvastatin 40 milliGRAM(s) at bedtime  enoxaparin Injectable 40 milliGRAM(s) every 24 hours  lidocaine   4% Patch 1 Patch daily  melatonin 5 milliGRAM(s) at bedtime  metoprolol tartrate 12.5 milliGRAM(s) every 12 hours      RECENT LABS/IMAGING  - Reviewed Today                        7.7    26.61 )-----------( 56       ( 26 May 2023 05:49 )             23.3     05-26    132<L>  |  100  |  15.6  ----------------------------<  95  3.9   |  23.0  |  0.66    Ca    9.6      26 May 2023 05:49  Phos  3.8     05-26  Mg     2.0     05-26    HEAD CT:  Thin parafalcine subdural hematoma is stable in size and appearance.    CERVICAL SPINE CT:  No evidence of an acute cervical spine fracture.      CT PELVIS ONLY - 1.  Acute fracture of the left inferior pubic ramus with stellate fracture lines extending into the ischium. Suspect nondisplaced fracture of the left superior pubic ramus, although evaluation is limited by osteopenia. 2.  Moderate degenerative arthritis of the hips. Multilevel lower lumbar spondylosis. 3.  Extensive vascular atherosclerosis.    ----------------------------------------------------------------------------------------  PHYSICAL EXAM  Constitutional - NAD, Comfortable  Extremities - No C/C/E, No calf tenderness   Neurologic Exam -                    Cognitive - AAOx self, NOT situation      Communication - Expressive deficits     Motor - No focal deficits  Psychiatric - Mood stable, Affect WNL  ----------------------------------------------------------------------------------------  ASSESSMENT/PLAN  89yFemale with functional deficits after mechanical fall sustaining trauma  Traumatic SDH -  Stable  Left inferior pubic ramus Fracture - WBAT   CAD - Lipitor   HTN - Lopressor  Mood - Elavil   Pain - Tylenol  DVT PPX - SCDs, Lovenox  Rehab/Impaired mobility and function - Patient continues to require hospitalization for the above diagnoses and ongoing active management of comorbid complications (memory impairments, SDH and pelvic fracture) that are substantially impairing functional ability and impairing quality of life requiring acute inpatient rehab.    Based on the patient's diagnosis, functional status and potential for progress, recommend ACUTE inpatient rehabilitation for the functional deficits consisting of 3 hours of therapy/day & 24 hour RN/daily PMR physician for comorbid medical management. Patient will be able to tolerate 3 hours a day.    Goals for acute inpatient rehab are to achieve modified independence with bed mobility, modified independence with transfers and modified independence with ambulation of 100' over an LOS of 7-10 days.    Will continue to follow. Rehab recommendations are dependent on how functional progress changes as well as how patient continues to participate and tolerate therapeutic interventions, which may change. Recommend ongoing mobilization by staff to maintain cardiopulmonary function and prevention of secondary complications related to debility. Discussed the specific management and recommendations above with rehab clinical care team/rehab liaison.    Patient confused, on 1:1.  Has no insight into hospitalization.     FUNCTIONAL PROGRESS  5/24 PT  Bed Mobility: Supine to Sit:   · Level of [employer]	minimum assist (75% patients effort)  · Physical Assist/Nonphysical Assist	1 person assist; nonverbal cues (demo/gestures); verbal cues    Bed Mobility Analysis:   · Bed Mobility Limitations	decreased ability to use legs for bridging/pushing  · Impairments Contributing to Impaired Bed Mobility	pain; decreased ROM; decreased strength    Transfer: Sit to Stand:   · Level of [employer] [place]	minimum assist (75% patients effort)  · Physical Assist/Nonphysical Assist	1 person assist; nonverbal cues (demo/gestures); verbal cues  · Weight-Bearing Restrictions	weight-bearing as tolerated  · Assistive Device	rolling walker    Transfer: Stand to Sit:   · Level of [employer] [place]	minimum assist (75% patients effort)  · Physical Assist/Nonphysical Assist	nonverbal cues (demo/gestures)  · Weight-Bearing Restrictions	weight-bearing as tolerated  · Assistive Device	rolling walker    Sit/Stand Transfer Safety Analysis:   · Impairments Contributing to Impaired Transfers	pain; decreased ROM; decreased strength; impaired balance    Gait Skills:   · Level of [employer] [place]	minimum assist (75% patients effort)  · Physical Assist/Nonphysical Assist	1 person assist; nonverbal cues (demo/gestures); verbal cues  · Weight-Bearing Restrictions	weight-bearing as tolerated  · Assistive Device	rolling walker  · Gait Distance	20ft    Gait Analysis:     · Gait Pattern Used	swing-to gait  · Gait Deviations Noted	decreased kim; decreased step length; decreased stride length  · Impairments Contributing to Gait Deviations	pain; decreased ROM; decreased strength; impaired balance    Stair Negotiation:   · Level of [employer] [place]	TBA    5/24 OT  Bathing Training:   · Level of [employer] [place]	minimum assist (75% patients effort)  · Physical Assist/Nonphysical Assist	1 person assist    Upper Body Dressing Training:   · Level of [employer] [place]	minimum assist (75% patients effort); to don gown  · Physical Assist/Nonphysical Assist	1 person assist    Lower Body Dressing Training:   · Level of [employer] [place]	maximum assist (25% patients effort); to don socks  · Physical Assist/Nonphysical Assist	1 person assist    Toilet Hygiene Training:   · Level of [employer] [place]	maximum assist (25% patients effort); secondary to purewick  · Physical Assist/Nonphysical Assist	1 person assist    Grooming Training:   · Level of [employer] [place]	supervision    Eating/Self-Feeding Training:   · Level of [employer] [place]	supervision  · Physical Assist/Nonphysical Assist	supervision      VITALS  T(C): 36.8 (05-26-23 @ 04:43), Max: 37.1 (05-25-23 @ 23:00)  HR: 74 (05-26-23 @ 04:43) (68 - 83)  BP: 130/69 (05-26-23 @ 04:43) (113/60 - 155/115)  RR: 18 (05-26-23 @ 04:43) (15 - 23)  SpO2: 99% (05-26-23 @ 04:43) (93% - 100%)  Wt(kg): --    MEDICATIONS   acetaminophen     Tablet .. 975 milliGRAM(s) every 6 hours PRN  amitriptyline 25 milliGRAM(s) every 12 hours  atorvastatin 40 milliGRAM(s) at bedtime  enoxaparin Injectable 40 milliGRAM(s) every 24 hours  lidocaine   4% Patch 1 Patch daily  melatonin 5 milliGRAM(s) at bedtime  metoprolol tartrate 12.5 milliGRAM(s) every 12 hours      RECENT LABS/IMAGING  - Reviewed Today                        7.7    26.61 )-----------( 56       ( 26 May 2023 05:49 )             23.3     05-26    132<L>  |  100  |  15.6  ----------------------------<  95  3.9   |  23.0  |  0.66    Ca    9.6      26 May 2023 05:49  Phos  3.8     05-26  Mg     2.0     05-26    HEAD CT:  Thin parafalcine subdural hematoma is stable in size and appearance.    CERVICAL SPINE CT:  No evidence of an acute cervical spine fracture.      CT PELVIS ONLY - 1.  Acute fracture of the left inferior pubic ramus with stellate fracture lines extending into the ischium. Suspect nondisplaced fracture of the left superior pubic ramus, although evaluation is limited by osteopenia. 2.  Moderate degenerative arthritis of the hips. Multilevel lower lumbar spondylosis. 3.  Extensive vascular atherosclerosis.    ----------------------------------------------------------------------------------------  PHYSICAL EXAM  Constitutional - NAD, Comfortable  Extremities - No C/C/E, No calf tenderness   Neurologic Exam -                    Cognitive - AAOx self, NOT situation      Communication - Expressive deficits     Motor - No focal deficits  Psychiatric - Mood stable, Affect WNL  ----------------------------------------------------------------------------------------  ASSESSMENT/PLAN  89yFemale with functional deficits after mechanical fall sustaining trauma  Traumatic SDH -  Stable  Left inferior pubic ramus Fracture - WBAT   CAD - Lipitor   HTN - Lopressor  Mood - Elavil, Seroquel 12.5mg HS (5/26)  Pain - Tylenol  DVT PPX - SCDs, Lovenox  Rehab/Impaired mobility and function - Patient continues to require hospitalization for the above diagnoses and ongoing active management of comorbid complications (memory impairments on 1:1 observation, SDH and pelvic fracture) that are substantially impairing functional ability and impairing quality of life requiring acute inpatient rehab. Will need 1:1 to be discontinued.     Based on the patient's diagnosis, functional status and potential for progress, recommend ACUTE inpatient rehabilitation for the functional deficits consisting of 3 hours of therapy/day & 24 hour RN/daily PMR physician for comorbid medical management. Patient will be able to tolerate 3 hours a day.    Goals for acute inpatient rehab are to achieve modified independence with bed mobility, modified independence with transfers and modified independence with ambulation of 100' over an LOS of 7-10 days.    Will continue to follow. Rehab recommendations are dependent on how functional progress changes as well as how patient continues to participate and tolerate therapeutic interventions, which may change. Recommend ongoing mobilization by staff to maintain cardiopulmonary function and prevention of secondary complications related to debility. Discussed the specific management and recommendations above with rehab clinical care team/rehab liaison.

## 2023-05-27 LAB
ANION GAP SERPL CALC-SCNC: 11 MMOL/L — SIGNIFICANT CHANGE UP (ref 5–17)
BASOPHILS # BLD AUTO: 0.22 K/UL — HIGH (ref 0–0.2)
BASOPHILS NFR BLD AUTO: 0.9 % — SIGNIFICANT CHANGE UP (ref 0–2)
BLASTS # FLD: 2.6 % — HIGH (ref 0–0)
BUN SERPL-MCNC: 21 MG/DL — HIGH (ref 8–20)
CALCIUM SERPL-MCNC: 9.6 MG/DL — SIGNIFICANT CHANGE UP (ref 8.4–10.5)
CHLORIDE SERPL-SCNC: 100 MMOL/L — SIGNIFICANT CHANGE UP (ref 96–108)
CO2 SERPL-SCNC: 22 MMOL/L — SIGNIFICANT CHANGE UP (ref 22–29)
CREAT SERPL-MCNC: 0.77 MG/DL — SIGNIFICANT CHANGE UP (ref 0.5–1.3)
EGFR: 73 ML/MIN/1.73M2 — SIGNIFICANT CHANGE UP
EOSINOPHIL # BLD AUTO: 0 K/UL — SIGNIFICANT CHANGE UP (ref 0–0.5)
EOSINOPHIL NFR BLD AUTO: 0 % — SIGNIFICANT CHANGE UP (ref 0–6)
GLUCOSE SERPL-MCNC: 93 MG/DL — SIGNIFICANT CHANGE UP (ref 70–99)
HCT VFR BLD CALC: 23.4 % — LOW (ref 34.5–45)
HGB BLD-MCNC: 7.7 G/DL — LOW (ref 11.5–15.5)
LYMPHOCYTES # BLD AUTO: 12.3 % — LOW (ref 13–44)
LYMPHOCYTES # BLD AUTO: 2.95 K/UL — SIGNIFICANT CHANGE UP (ref 1–3.3)
MAGNESIUM SERPL-MCNC: 2 MG/DL — SIGNIFICANT CHANGE UP (ref 1.6–2.6)
MANUAL SMEAR VERIFICATION: SIGNIFICANT CHANGE UP
MCHC RBC-ENTMCNC: 27.7 PG — SIGNIFICANT CHANGE UP (ref 27–34)
MCHC RBC-ENTMCNC: 32.9 GM/DL — SIGNIFICANT CHANGE UP (ref 32–36)
MCV RBC AUTO: 84.2 FL — SIGNIFICANT CHANGE UP (ref 80–100)
METAMYELOCYTES # FLD: 4.4 % — HIGH (ref 0–0)
MONOCYTES # BLD AUTO: 0 K/UL — SIGNIFICANT CHANGE UP (ref 0–0.9)
MONOCYTES NFR BLD AUTO: 0 % — LOW (ref 2–14)
MYELOCYTES NFR BLD: 7 % — HIGH (ref 0–0)
NEUTROPHILS # BLD AUTO: 17.25 K/UL — HIGH (ref 1.8–7.4)
NEUTROPHILS NFR BLD AUTO: 70.2 % — SIGNIFICANT CHANGE UP (ref 43–77)
NEUTS BAND # BLD: 1.7 % — SIGNIFICANT CHANGE UP (ref 0–8)
NRBC # BLD: 1 /100 — HIGH (ref 0–0)
PHOSPHATE SERPL-MCNC: 4.1 MG/DL — SIGNIFICANT CHANGE UP (ref 2.4–4.7)
PLAT MORPH BLD: NORMAL — SIGNIFICANT CHANGE UP
PLATELET # BLD AUTO: 49 K/UL — LOW (ref 150–400)
POTASSIUM SERPL-MCNC: 4.2 MMOL/L — SIGNIFICANT CHANGE UP (ref 3.5–5.3)
POTASSIUM SERPL-SCNC: 4.2 MMOL/L — SIGNIFICANT CHANGE UP (ref 3.5–5.3)
PROMYELOCYTES # FLD: 0.9 % — HIGH (ref 0–0)
RBC # BLD: 2.78 M/UL — LOW (ref 3.8–5.2)
RBC # FLD: 15.6 % — HIGH (ref 10.3–14.5)
RBC BLD AUTO: NORMAL — SIGNIFICANT CHANGE UP
SODIUM SERPL-SCNC: 133 MMOL/L — LOW (ref 135–145)
WBC # BLD: 23.99 K/UL — HIGH (ref 3.8–10.5)
WBC # FLD AUTO: 23.99 K/UL — HIGH (ref 3.8–10.5)

## 2023-05-27 PROCEDURE — 99231 SBSQ HOSP IP/OBS SF/LOW 25: CPT

## 2023-05-27 RX ADMIN — Medication 25 MILLIGRAM(S): at 17:27

## 2023-05-27 RX ADMIN — ATORVASTATIN CALCIUM 40 MILLIGRAM(S): 80 TABLET, FILM COATED ORAL at 20:26

## 2023-05-27 RX ADMIN — QUETIAPINE FUMARATE 12.5 MILLIGRAM(S): 200 TABLET, FILM COATED ORAL at 20:26

## 2023-05-27 RX ADMIN — Medication 12.5 MILLIGRAM(S): at 05:40

## 2023-05-27 RX ADMIN — Medication 12.5 MILLIGRAM(S): at 17:27

## 2023-05-27 RX ADMIN — Medication 25 MILLIGRAM(S): at 05:40

## 2023-05-27 RX ADMIN — Medication 5 MILLIGRAM(S): at 20:26

## 2023-05-27 RX ADMIN — ENOXAPARIN SODIUM 40 MILLIGRAM(S): 100 INJECTION SUBCUTANEOUS at 20:27

## 2023-05-27 NOTE — PROGRESS NOTE ADULT - SUBJECTIVE AND OBJECTIVE BOX
Subjective: Patient seen and examined at bedside, no complaints, no events overnight.     MEDICATIONS  (STANDING):  amitriptyline 25 milliGRAM(s) Oral every 12 hours  atorvastatin 40 milliGRAM(s) Oral at bedtime  enoxaparin Injectable 40 milliGRAM(s) SubCutaneous every 24 hours  lidocaine   4% Patch 1 Patch Transdermal daily  melatonin 5 milliGRAM(s) Oral at bedtime  metoprolol tartrate 12.5 milliGRAM(s) Oral every 12 hours  QUEtiapine 12.5 milliGRAM(s) Oral at bedtime  MEDICATIONS  (PRN):  acetaminophen     Tablet .. 975 milliGRAM(s) Oral every 6 hours PRN Temp greater or equal to 38C (100.4F), Mild Pain (1 - 3)    Vital Signs Last 24 Hrs  T(C): 37.3 (26 May 2023 19:15), Max: 37.3 (26 May 2023 15:34)  T(F): 99.1 (26 May 2023 19:15), Max: 99.1 (26 May 2023 15:34)  HR: 88 (26 May 2023 19:15) (74 - 88)  BP: 124/65 (26 May 2023 19:15) (104/53 - 130/69)  BP(mean): --  RR: 18 (26 May 2023 19:15) (18 - 18)  SpO2: 96% (26 May 2023 19:15) (96% - 99%)  Parameters below as of 26 May 2023 19:15  Patient On (Oxygen Delivery Method): room air    Physical Exam:  Constitutional: NAD  HEENT: PERRL, EOMI  Neck: No JVD, FROM without pain  Respiratory: Respirations non-labored, no accessory muscle use  Gastrointestinal: Soft, non-tender, non-distended, + R posterior back ecchymosis   Extremities: No peripheral edema, No cyanosis  Neurological: A&O x 3; without gross deficit    LABS:  pending     A: Patient is a 89 yo F s/p fall with SDH, L sup/inf rami fx, gluteal hematoma, s/p 1 plt and 2u prbc transfusions, hemodynamically stable, afebrile.     Plan:   PMR for acute rehab   Palliative following- not ready for hospice   DVT ppx   Hem/onc following   Regular diet   Ortho- WBAT   PT/OT - Acute Rehab candidate  Pain control   F/u Neurosurgery as outpatient  CBC in AM  Monitor delirium- maintain sleep/wake cycle   DC planning to acute rehab

## 2023-05-28 LAB
ANION GAP SERPL CALC-SCNC: 9 MMOL/L — SIGNIFICANT CHANGE UP (ref 5–17)
BUN SERPL-MCNC: 16.4 MG/DL — SIGNIFICANT CHANGE UP (ref 8–20)
CALCIUM SERPL-MCNC: 9.6 MG/DL — SIGNIFICANT CHANGE UP (ref 8.4–10.5)
CHLORIDE SERPL-SCNC: 96 MMOL/L — SIGNIFICANT CHANGE UP (ref 96–108)
CO2 SERPL-SCNC: 23 MMOL/L — SIGNIFICANT CHANGE UP (ref 22–29)
CREAT SERPL-MCNC: 0.68 MG/DL — SIGNIFICANT CHANGE UP (ref 0.5–1.3)
EGFR: 83 ML/MIN/1.73M2 — SIGNIFICANT CHANGE UP
GLUCOSE SERPL-MCNC: 96 MG/DL — SIGNIFICANT CHANGE UP (ref 70–99)
HCT VFR BLD CALC: 22.1 % — LOW (ref 34.5–45)
HGB BLD-MCNC: 7.4 G/DL — LOW (ref 11.5–15.5)
MAGNESIUM SERPL-MCNC: 2 MG/DL — SIGNIFICANT CHANGE UP (ref 1.6–2.6)
MCHC RBC-ENTMCNC: 28.5 PG — SIGNIFICANT CHANGE UP (ref 27–34)
MCHC RBC-ENTMCNC: 33.5 GM/DL — SIGNIFICANT CHANGE UP (ref 32–36)
MCV RBC AUTO: 85 FL — SIGNIFICANT CHANGE UP (ref 80–100)
NRBC # BLD: 1 /100 WBCS — HIGH (ref 0–0)
PHOSPHATE SERPL-MCNC: 3.9 MG/DL — SIGNIFICANT CHANGE UP (ref 2.4–4.7)
PLATELET # BLD AUTO: 48 K/UL — LOW (ref 150–400)
POTASSIUM SERPL-MCNC: 3.8 MMOL/L — SIGNIFICANT CHANGE UP (ref 3.5–5.3)
POTASSIUM SERPL-SCNC: 3.8 MMOL/L — SIGNIFICANT CHANGE UP (ref 3.5–5.3)
RBC # BLD: 2.6 M/UL — LOW (ref 3.8–5.2)
RBC # FLD: 15.9 % — HIGH (ref 10.3–14.5)
SODIUM SERPL-SCNC: 128 MMOL/L — LOW (ref 135–145)
WBC # BLD: 27.44 K/UL — HIGH (ref 3.8–10.5)
WBC # FLD AUTO: 27.44 K/UL — HIGH (ref 3.8–10.5)

## 2023-05-28 PROCEDURE — 99231 SBSQ HOSP IP/OBS SF/LOW 25: CPT | Mod: GC

## 2023-05-28 RX ORDER — SODIUM CHLORIDE 9 MG/ML
1 INJECTION INTRAMUSCULAR; INTRAVENOUS; SUBCUTANEOUS
Refills: 0 | Status: DISCONTINUED | OUTPATIENT
Start: 2023-05-28 | End: 2023-05-29

## 2023-05-28 RX ADMIN — SODIUM CHLORIDE 1 GRAM(S): 9 INJECTION INTRAMUSCULAR; INTRAVENOUS; SUBCUTANEOUS at 17:54

## 2023-05-28 RX ADMIN — ATORVASTATIN CALCIUM 40 MILLIGRAM(S): 80 TABLET, FILM COATED ORAL at 21:50

## 2023-05-28 RX ADMIN — Medication 12.5 MILLIGRAM(S): at 17:53

## 2023-05-28 RX ADMIN — Medication 5 MILLIGRAM(S): at 21:50

## 2023-05-28 RX ADMIN — Medication 12.5 MILLIGRAM(S): at 04:41

## 2023-05-28 RX ADMIN — Medication 25 MILLIGRAM(S): at 04:41

## 2023-05-28 RX ADMIN — QUETIAPINE FUMARATE 12.5 MILLIGRAM(S): 200 TABLET, FILM COATED ORAL at 21:50

## 2023-05-28 RX ADMIN — ENOXAPARIN SODIUM 40 MILLIGRAM(S): 100 INJECTION SUBCUTANEOUS at 21:49

## 2023-05-28 RX ADMIN — Medication 25 MILLIGRAM(S): at 17:54

## 2023-05-28 NOTE — PROGRESS NOTE ADULT - SUBJECTIVE AND OBJECTIVE BOX
Subjective: Patient seen and examined at bedside, no complaints, no events overnight.     MEDICATIONS  (STANDING):  amitriptyline 25 milliGRAM(s) Oral every 12 hours  atorvastatin 40 milliGRAM(s) Oral at bedtime  enoxaparin Injectable 40 milliGRAM(s) SubCutaneous every 24 hours  lidocaine   4% Patch 1 Patch Transdermal daily  melatonin 5 milliGRAM(s) Oral at bedtime  metoprolol tartrate 12.5 milliGRAM(s) Oral every 12 hours  QUEtiapine 12.5 milliGRAM(s) Oral at bedtime  MEDICATIONS  (PRN):  acetaminophen     Tablet .. 975 milliGRAM(s) Oral every 6 hours PRN Temp greater or equal to 38C (100.4F), Mild Pain (1 - 3)    Vital Signs Last 24 Hrs  T(C): 37.3 (27 May 2023 20:53), Max: 37.3 (27 May 2023 20:53)  T(F): 99.1 (27 May 2023 20:53), Max: 99.1 (27 May 2023 20:53)  HR: 80 (27 May 2023 20:53) (73 - 95)  BP: 120/64 (27 May 2023 20:53) (120/64 - 136/66)  BP(mean): --  RR: 18 (27 May 2023 20:53) (18 - 19)  SpO2: 95% (27 May 2023 20:53) (90% - 99%)    Parameters below as of 27 May 2023 20:53  Patient On (Oxygen Delivery Method): room air        Physical Exam:  Constitutional: NAD  HEENT: PERRL, EOMI  Neck: No JVD, FROM without pain  Respiratory: Respirations non-labored, no accessory muscle use  Gastrointestinal: Soft, non-tender, non-distended, + R posterior back ecchymosis   Extremities: No peripheral edema, No cyanosis  Neurological: A&O x 3; without gross deficit    LABS:  None    A: Patient is a 89 yo F s/p fall with SDH, L sup/inf rami fx, gluteal hematoma, s/p 1 plt and 2u prbc transfusions, hemodynamically stable, afebrile.     Plan:   Palliative following- not ready for hospice   DVT ppx   Hem/onc following   Regular diet   Ortho- WBAT   Pain control   F/u Neurosurgery as outpatient  Monitor delirium- maintain sleep/wake cycle   DC planning to acute rehab  Subjective: Patient seen and examined at bedside, no complaints, no events overnight.     MEDICATIONS  (STANDING):  amitriptyline 25 milliGRAM(s) Oral every 12 hours  atorvastatin 40 milliGRAM(s) Oral at bedtime  enoxaparin Injectable 40 milliGRAM(s) SubCutaneous every 24 hours  lidocaine   4% Patch 1 Patch Transdermal daily  melatonin 5 milliGRAM(s) Oral at bedtime  metoprolol tartrate 12.5 milliGRAM(s) Oral every 12 hours  QUEtiapine 12.5 milliGRAM(s) Oral at bedtime  MEDICATIONS  (PRN):  acetaminophen     Tablet .. 975 milliGRAM(s) Oral every 6 hours PRN Temp greater or equal to 38C (100.4F), Mild Pain (1 - 3)    Vital Signs Last 24 Hrs  T(C): 37.3 (27 May 2023 20:53), Max: 37.3 (27 May 2023 20:53)  T(F): 99.1 (27 May 2023 20:53), Max: 99.1 (27 May 2023 20:53)  HR: 80 (27 May 2023 20:53) (73 - 95)  BP: 120/64 (27 May 2023 20:53) (120/64 - 136/66)  BP(mean): --  RR: 18 (27 May 2023 20:53) (18 - 19)  SpO2: 95% (27 May 2023 20:53) (90% - 99%)    Parameters below as of 27 May 2023 20:53  Patient On (Oxygen Delivery Method): room air        Physical Exam:  Constitutional: NAD  HEENT: PERRL, EOMI  Neck: No JVD, FROM without pain  Respiratory: Respirations non-labored, no accessory muscle use  Gastrointestinal: Soft, non-tender, non-distended, + R posterior back ecchymosis   Extremities: No peripheral edema, No cyanosis  Neurological: A&O x 3; without gross deficit    LABS:  None    A: Patient is a 89 yo F s/p fall with SDH, L sup/inf rami fx, gluteal hematoma, s/p 1 plt and 2u prbc transfusions, hemodynamically stable, afebrile.     Plan:   Palliative following- not ready for hospice   DVT ppx   Hem/onc following   Regular diet   Ortho- WBAT   Pain control   F/u Neurosurgery as outpatient  Monitor delirium- maintain sleep/wake cycle   DC planning to acute rehab - remains on 1:1. Must be off prior to discharge to rehab

## 2023-05-28 NOTE — PROGRESS NOTE ADULT - ATTENDING COMMENTS
Agree with above assessment.  The patient was seen and examined by myself with the surgical PA and resident. The patient is no new events or complaints.  She remains trauma stable for discharge planning.

## 2023-05-29 LAB
ANION GAP SERPL CALC-SCNC: 10 MMOL/L — SIGNIFICANT CHANGE UP (ref 5–17)
BLD GP AB SCN SERPL QL: SIGNIFICANT CHANGE UP
BUN SERPL-MCNC: 17.1 MG/DL — SIGNIFICANT CHANGE UP (ref 8–20)
CALCIUM SERPL-MCNC: 9.6 MG/DL — SIGNIFICANT CHANGE UP (ref 8.4–10.5)
CHLORIDE SERPL-SCNC: 96 MMOL/L — SIGNIFICANT CHANGE UP (ref 96–108)
CHLORIDE UR-SCNC: 38 MMOL/L — SIGNIFICANT CHANGE UP
CO2 SERPL-SCNC: 22 MMOL/L — SIGNIFICANT CHANGE UP (ref 22–29)
CREAT ?TM UR-MCNC: 56 MG/DL — SIGNIFICANT CHANGE UP
CREAT SERPL-MCNC: 0.71 MG/DL — SIGNIFICANT CHANGE UP (ref 0.5–1.3)
EGFR: 81 ML/MIN/1.73M2 — SIGNIFICANT CHANGE UP
GLUCOSE SERPL-MCNC: 92 MG/DL — SIGNIFICANT CHANGE UP (ref 70–99)
HCT VFR BLD CALC: 20.5 % — CRITICAL LOW (ref 34.5–45)
HCT VFR BLD CALC: 20.8 % — CRITICAL LOW (ref 34.5–45)
HCT VFR BLD CALC: 23.8 % — LOW (ref 34.5–45)
HGB BLD-MCNC: 6.9 G/DL — CRITICAL LOW (ref 11.5–15.5)
HGB BLD-MCNC: 7 G/DL — CRITICAL LOW (ref 11.5–15.5)
HGB BLD-MCNC: 7.8 G/DL — LOW (ref 11.5–15.5)
MAGNESIUM SERPL-MCNC: 2 MG/DL — SIGNIFICANT CHANGE UP (ref 1.8–2.6)
MCHC RBC-ENTMCNC: 27.6 PG — SIGNIFICANT CHANGE UP (ref 27–34)
MCHC RBC-ENTMCNC: 27.9 PG — SIGNIFICANT CHANGE UP (ref 27–34)
MCHC RBC-ENTMCNC: 32.8 GM/DL — SIGNIFICANT CHANGE UP (ref 32–36)
MCHC RBC-ENTMCNC: 33.2 GM/DL — SIGNIFICANT CHANGE UP (ref 32–36)
MCV RBC AUTO: 84.1 FL — SIGNIFICANT CHANGE UP (ref 80–100)
MCV RBC AUTO: 84.2 FL — SIGNIFICANT CHANGE UP (ref 80–100)
OSMOLALITY SERPL: 273 MOSMOL/KG — LOW (ref 280–301)
OSMOLALITY UR: 371 MOSM/KG — SIGNIFICANT CHANGE UP (ref 300–1000)
PHOSPHATE SERPL-MCNC: 3.9 MG/DL — SIGNIFICANT CHANGE UP (ref 2.4–4.7)
PLATELET # BLD AUTO: 38 K/UL — LOW (ref 150–400)
PLATELET # BLD AUTO: 42 K/UL — LOW (ref 150–400)
POTASSIUM SERPL-MCNC: 4.1 MMOL/L — SIGNIFICANT CHANGE UP (ref 3.5–5.3)
POTASSIUM SERPL-SCNC: 4.1 MMOL/L — SIGNIFICANT CHANGE UP (ref 3.5–5.3)
RBC # BLD: 2.47 M/UL — LOW (ref 3.8–5.2)
RBC # BLD: 2.83 M/UL — LOW (ref 3.8–5.2)
RBC # FLD: 16 % — HIGH (ref 10.3–14.5)
RBC # FLD: 16.7 % — HIGH (ref 10.3–14.5)
SODIUM SERPL-SCNC: 128 MMOL/L — LOW (ref 135–145)
SODIUM UR-SCNC: 42 MMOL/L — SIGNIFICANT CHANGE UP
WBC # BLD: 22.86 K/UL — HIGH (ref 3.8–10.5)
WBC # BLD: 24.04 K/UL — HIGH (ref 3.8–10.5)
WBC # FLD AUTO: 22.86 K/UL — HIGH (ref 3.8–10.5)
WBC # FLD AUTO: 24.04 K/UL — HIGH (ref 3.8–10.5)

## 2023-05-29 PROCEDURE — 73620 X-RAY EXAM OF FOOT: CPT | Mod: 26,LT

## 2023-05-29 PROCEDURE — 99231 SBSQ HOSP IP/OBS SF/LOW 25: CPT

## 2023-05-29 RX ORDER — SODIUM CHLORIDE 9 MG/ML
1 INJECTION INTRAMUSCULAR; INTRAVENOUS; SUBCUTANEOUS EVERY 8 HOURS
Refills: 0 | Status: DISCONTINUED | OUTPATIENT
Start: 2023-05-29 | End: 2023-06-08

## 2023-05-29 RX ADMIN — Medication 5 MILLIGRAM(S): at 21:22

## 2023-05-29 RX ADMIN — Medication 12.5 MILLIGRAM(S): at 05:21

## 2023-05-29 RX ADMIN — Medication 12.5 MILLIGRAM(S): at 17:03

## 2023-05-29 RX ADMIN — ATORVASTATIN CALCIUM 40 MILLIGRAM(S): 80 TABLET, FILM COATED ORAL at 21:22

## 2023-05-29 RX ADMIN — Medication 25 MILLIGRAM(S): at 17:02

## 2023-05-29 RX ADMIN — SODIUM CHLORIDE 1 GRAM(S): 9 INJECTION INTRAMUSCULAR; INTRAVENOUS; SUBCUTANEOUS at 05:21

## 2023-05-29 RX ADMIN — QUETIAPINE FUMARATE 12.5 MILLIGRAM(S): 200 TABLET, FILM COATED ORAL at 21:22

## 2023-05-29 RX ADMIN — SODIUM CHLORIDE 1 GRAM(S): 9 INJECTION INTRAMUSCULAR; INTRAVENOUS; SUBCUTANEOUS at 21:21

## 2023-05-29 RX ADMIN — SODIUM CHLORIDE 1 GRAM(S): 9 INJECTION INTRAMUSCULAR; INTRAVENOUS; SUBCUTANEOUS at 17:02

## 2023-05-29 RX ADMIN — ENOXAPARIN SODIUM 40 MILLIGRAM(S): 100 INJECTION SUBCUTANEOUS at 21:21

## 2023-05-29 RX ADMIN — Medication 25 MILLIGRAM(S): at 05:20

## 2023-05-29 NOTE — PROGRESS NOTE ADULT - ASSESSMENT
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 CT at [place] showed a Left Superior and inferior pubic rami fracture. SDH intrahemishere region on CT head. Repeat CTH stable.    1) MDS  recent flow cytometry on blood showed 13% blasts  ? transformation to leukemia  given age and comorbidities not a candidate for aggressive therapy  c/w supportive transfusions  keep hgb > 7, platelet count > 50k  Hgb 7 - would transfuse 1 u PRBC  Palliative care eval    2) L hip fracture  ortho following  conservative measures  plan for rehab    3) SDH  repeat CTH stable  neurosx following  goal plt > 70-80k, given BM involved this may not be achieved with transfusions   plt count 38,000 today    4) DVT ppx  lovenox as long as plt count > 30,000    further mx as per primary team

## 2023-05-29 NOTE — PROGRESS NOTE ADULT - SUBJECTIVE AND OBJECTIVE BOX
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 CT at [place] showed a Left Superior and inferior pubic rami fracture. SDH intrahemishere region on CT head. Repeat CTH stable.    now on floors, afebrile  confused, delirium on 1:1 obv  hgb 7, WBC 22,000, plt 38,000      MEDICATIONS  (STANDING):  amitriptyline 25 milliGRAM(s) Oral every 12 hours  atorvastatin 40 milliGRAM(s) Oral at bedtime  enoxaparin Injectable 40 milliGRAM(s) SubCutaneous every 24 hours  lidocaine   4% Patch 1 Patch Transdermal daily  melatonin 5 milliGRAM(s) Oral at bedtime  metoprolol tartrate 12.5 milliGRAM(s) Oral every 12 hours  QUEtiapine 12.5 milliGRAM(s) Oral at bedtime  sodium chloride 1 Gram(s) Oral two times a day    ICU Vital Signs Last 24 Hrs  T(C): 36.8 (29 May 2023 09:28), Max: 36.8 (29 May 2023 09:28)  T(F): 98.3 (29 May 2023 09:28), Max: 98.3 (29 May 2023 09:28)  HR: 77 (29 May 2023 09:28) (77 - 92)  BP: 116/65 (29 May 2023 09:28) (116/65 - 131/59)  BP(mean): --  ABP: --  ABP(mean): --  RR: 18 (29 May 2023 09:28) (18 - 18)  SpO2: 98% (29 May 2023 09:28) (95% - 98%)    O2 Parameters below as of 29 May 2023 09:28  Patient On (Oxygen Delivery Method): room air    Gen - confused  Heart - S1S2 RRR  Lung - Dec BS B/L  Abd - soft ND NT  eXT - NO EDEMA                              7.0    x     )-----------( x        ( 29 May 2023 07:33 )             20.5                     7.7    26.61 )-----------( 56       ( 26 May 2023 05:49 )             23.3                         7.1    25.73 )-----------( 60       ( 25 May 2023 04:05 )             21.5     05-29    128<L>  |  96  |  17.1  ----------------------------<  92  4.1   |  22.0  |  0.71    Ca    9.6      29 May 2023 05:52  Phos  3.9     05-29  Mg     2.0     05-29 05-26    132<L>  |  100  |  15.6  ----------------------------<  95  3.9   |  23.0  |  0.66    Ca    9.6      26 May 2023 05:49  Phos  3.8     05-26  Mg     2.0     05-26 05-25    132<L>  |  101  |  23.1<H>  ----------------------------<  104<H>  4.0   |  21.0<L>  |  0.73    Ca    9.4      25 May 2023 04:05  Phos  3.6     05-25  Mg     1.9     05-25    TPro  7.7  /  Alb  3.2<L>  /  TBili  0.3  /  DBili  x   /  AST  28  /  ALT  39  /  AlkPhos  105  05-23

## 2023-05-29 NOTE — PROGRESS NOTE ADULT - SUBJECTIVE AND OBJECTIVE BOX
Subjective: Patient seen and examined at bedside, no complaints, no events overnight.     MEDICATIONS  (STANDING):  amitriptyline 25 milliGRAM(s) Oral every 12 hours  atorvastatin 40 milliGRAM(s) Oral at bedtime  enoxaparin Injectable 40 milliGRAM(s) SubCutaneous every 24 hours  lidocaine   4% Patch 1 Patch Transdermal daily  melatonin 5 milliGRAM(s) Oral at bedtime  metoprolol tartrate 12.5 milliGRAM(s) Oral every 12 hours  QUEtiapine 12.5 milliGRAM(s) Oral at bedtime  MEDICATIONS  (PRN):  acetaminophen     Tablet .. 975 milliGRAM(s) Oral every 6 hours PRN Temp greater or equal to 38C (100.4F), Mild Pain (1 - 3)    Vital Signs Last 24 Hrs  T(C): 36.7 (28 May 2023 16:50), Max: 36.7 (28 May 2023 04:41)  T(F): 98 (28 May 2023 16:50), Max: 98 (28 May 2023 04:41)  HR: 92 (28 May 2023 16:50) (72 - 92)  BP: 131/59 (28 May 2023 16:50) (121/64 - 138/68)  BP(mean): --  RR: 18 (28 May 2023 16:50) (18 - 18)  SpO2: 95% (28 May 2023 16:50) (94% - 98%)    Parameters below as of 28 May 2023 16:50  Patient On (Oxygen Delivery Method): room air            Physical Exam:  Constitutional: NAD  HEENT: PERRL, EOMI  Neck: No JVD, FROM without pain  Respiratory: Respirations non-labored, no accessory muscle use  Gastrointestinal: Soft, non-tender, non-distended, + R posterior back ecchymosis   Extremities: No peripheral edema, No cyanosis  Neurological: A&O x 3; without gross deficit    LABS:  None    A: Patient is a 89 yo F s/p fall with SDH, L sup/inf rami fx, gluteal hematoma, s/p 1 plt and 2u prbc transfusions, hemodynamically stable, afebrile.     Plan:   Palliative following- not ready for hospice   DVT ppx   Hem/onc following   Regular diet   Ortho- WBAT   Pain control   F/u Neurosurgery as outpatient  Monitor delirium- maintain sleep/wake cycle   DC planning to acute rehab - remains on 1:1. Must be off prior to discharge to rehab Subjective: Patient seen and examined at bedside, no complaints, no events overnight. Started on Salt tabs during day for hyponatremia    MEDICATIONS  (STANDING):  amitriptyline 25 milliGRAM(s) Oral every 12 hours  atorvastatin 40 milliGRAM(s) Oral at bedtime  enoxaparin Injectable 40 milliGRAM(s) SubCutaneous every 24 hours  lidocaine   4% Patch 1 Patch Transdermal daily  melatonin 5 milliGRAM(s) Oral at bedtime  metoprolol tartrate 12.5 milliGRAM(s) Oral every 12 hours  QUEtiapine 12.5 milliGRAM(s) Oral at bedtime  MEDICATIONS  (PRN):  acetaminophen     Tablet .. 975 milliGRAM(s) Oral every 6 hours PRN Temp greater or equal to 38C (100.4F), Mild Pain (1 - 3)    Vital Signs Last 24 Hrs  T(C): 36.7 (28 May 2023 16:50), Max: 36.7 (28 May 2023 04:41)  T(F): 98 (28 May 2023 16:50), Max: 98 (28 May 2023 04:41)  HR: 92 (28 May 2023 16:50) (72 - 92)  BP: 131/59 (28 May 2023 16:50) (121/64 - 138/68)  BP(mean): --  RR: 18 (28 May 2023 16:50) (18 - 18)  SpO2: 95% (28 May 2023 16:50) (94% - 98%)    Parameters below as of 28 May 2023 16:50  Patient On (Oxygen Delivery Method): room air            Physical Exam:  Constitutional: NAD  HEENT: PERRL, EOMI  Neck: No JVD, FROM without pain  Respiratory: Respirations non-labored, no accessory muscle use  Gastrointestinal: Soft, non-tender, non-distended, + R posterior back ecchymosis   Extremities: No peripheral edema, No cyanosis  Neurological: A&O x 3; without gross deficit    LABS:  None    A: Patient is a 89 yo F s/p fall with SDH, L sup/inf rami fx, gluteal hematoma, s/p 1 plt and 2u prbc transfusions, hemodynamically stable, afebrile.     Plan:   Palliative following- not ready for hospice   Hyponatremia- salt tabs added. f/u AM BMP, Urine electrolytes ordered for further evaluation of hyponatremia  DVT ppx   Hem/onc following   Regular diet   Ortho- WBAT   Pain control   F/u Neurosurgery as outpatient  Monitor delirium- maintain sleep/wake cycle   DC planning to acute rehab - remains on 1:1. Must be off prior to discharge to rehab

## 2023-05-30 LAB
ANION GAP SERPL CALC-SCNC: 8 MMOL/L — SIGNIFICANT CHANGE UP (ref 5–17)
ANISOCYTOSIS BLD QL: SLIGHT — SIGNIFICANT CHANGE UP
ANISOCYTOSIS BLD QL: SLIGHT — SIGNIFICANT CHANGE UP
BASOPHILS # BLD AUTO: 0 K/UL — SIGNIFICANT CHANGE UP (ref 0–0.2)
BASOPHILS # BLD AUTO: 0 K/UL — SIGNIFICANT CHANGE UP (ref 0–0.2)
BASOPHILS NFR BLD AUTO: 0 % — SIGNIFICANT CHANGE UP (ref 0–2)
BASOPHILS NFR BLD AUTO: 0 % — SIGNIFICANT CHANGE UP (ref 0–2)
BLASTS # FLD: 3.4 % — HIGH (ref 0–0)
BLASTS # FLD: 6.1 % — HIGH (ref 0–0)
BUN SERPL-MCNC: 16.1 MG/DL — SIGNIFICANT CHANGE UP (ref 8–20)
CALCIUM SERPL-MCNC: 9.4 MG/DL — SIGNIFICANT CHANGE UP (ref 8.4–10.5)
CHLORIDE SERPL-SCNC: 100 MMOL/L — SIGNIFICANT CHANGE UP (ref 96–108)
CO2 SERPL-SCNC: 23 MMOL/L — SIGNIFICANT CHANGE UP (ref 22–29)
CREAT SERPL-MCNC: 0.69 MG/DL — SIGNIFICANT CHANGE UP (ref 0.5–1.3)
EGFR: 82 ML/MIN/1.73M2 — SIGNIFICANT CHANGE UP
EOSINOPHIL # BLD AUTO: 0 K/UL — SIGNIFICANT CHANGE UP (ref 0–0.5)
EOSINOPHIL # BLD AUTO: 0 K/UL — SIGNIFICANT CHANGE UP (ref 0–0.5)
EOSINOPHIL NFR BLD AUTO: 0 % — SIGNIFICANT CHANGE UP (ref 0–6)
EOSINOPHIL NFR BLD AUTO: 0 % — SIGNIFICANT CHANGE UP (ref 0–6)
GLUCOSE SERPL-MCNC: 89 MG/DL — SIGNIFICANT CHANGE UP (ref 70–99)
HCT VFR BLD CALC: 23.8 % — LOW (ref 34.5–45)
HGB BLD-MCNC: 7.8 G/DL — LOW (ref 11.5–15.5)
LYMPHOCYTES # BLD AUTO: 13 % — SIGNIFICANT CHANGE UP (ref 13–44)
LYMPHOCYTES # BLD AUTO: 2.97 K/UL — SIGNIFICANT CHANGE UP (ref 1–3.3)
LYMPHOCYTES # BLD AUTO: 21.6 % — SIGNIFICANT CHANGE UP (ref 13–44)
LYMPHOCYTES # BLD AUTO: 5.19 K/UL — HIGH (ref 1–3.3)
MAGNESIUM SERPL-MCNC: 2 MG/DL — SIGNIFICANT CHANGE UP (ref 1.6–2.6)
MANUAL SMEAR VERIFICATION: SIGNIFICANT CHANGE UP
MANUAL SMEAR VERIFICATION: SIGNIFICANT CHANGE UP
MCHC RBC-ENTMCNC: 27.1 PG — SIGNIFICANT CHANGE UP (ref 27–34)
MCHC RBC-ENTMCNC: 32.8 GM/DL — SIGNIFICANT CHANGE UP (ref 32–36)
MCV RBC AUTO: 82.6 FL — SIGNIFICANT CHANGE UP (ref 80–100)
METAMYELOCYTES # FLD: 5.2 % — HIGH (ref 0–0)
METAMYELOCYTES # FLD: 7.8 % — HIGH (ref 0–0)
MICROCYTES BLD QL: SLIGHT — SIGNIFICANT CHANGE UP
MICROCYTES BLD QL: SLIGHT — SIGNIFICANT CHANGE UP
MONOCYTES # BLD AUTO: 0 K/UL — SIGNIFICANT CHANGE UP (ref 0–0.9)
MONOCYTES # BLD AUTO: 0.8 K/UL — SIGNIFICANT CHANGE UP (ref 0–0.9)
MONOCYTES NFR BLD AUTO: 0 % — LOW (ref 2–14)
MONOCYTES NFR BLD AUTO: 3.5 % — SIGNIFICANT CHANGE UP (ref 2–14)
MYELOCYTES NFR BLD: 10.3 % — HIGH (ref 0–0)
MYELOCYTES NFR BLD: 9.6 % — HIGH (ref 0–0)
NEUTROPHILS # BLD AUTO: 13.72 K/UL — HIGH (ref 1.8–7.4)
NEUTROPHILS # BLD AUTO: 13.87 K/UL — HIGH (ref 1.8–7.4)
NEUTROPHILS NFR BLD AUTO: 54.3 % — SIGNIFICANT CHANGE UP (ref 43–77)
NEUTROPHILS NFR BLD AUTO: 59.1 % — SIGNIFICANT CHANGE UP (ref 43–77)
NEUTS BAND # BLD: 0.9 % — SIGNIFICANT CHANGE UP (ref 0–8)
NEUTS BAND # BLD: 3.4 % — SIGNIFICANT CHANGE UP (ref 0–8)
NRBC # BLD: 1 /100 — HIGH (ref 0–0)
NRBC # BLD: 2 /100 — HIGH (ref 0–0)
PHOSPHATE SERPL-MCNC: 3.7 MG/DL — SIGNIFICANT CHANGE UP (ref 2.4–4.7)
PLAT MORPH BLD: NORMAL — SIGNIFICANT CHANGE UP
PLAT MORPH BLD: NORMAL — SIGNIFICANT CHANGE UP
PLATELET # BLD AUTO: 32 K/UL — LOW (ref 150–400)
POIKILOCYTOSIS BLD QL AUTO: SLIGHT — SIGNIFICANT CHANGE UP
POIKILOCYTOSIS BLD QL AUTO: SLIGHT — SIGNIFICANT CHANGE UP
POLYCHROMASIA BLD QL SMEAR: SIGNIFICANT CHANGE UP
POLYCHROMASIA BLD QL SMEAR: SIGNIFICANT CHANGE UP
POTASSIUM SERPL-MCNC: 4.1 MMOL/L — SIGNIFICANT CHANGE UP (ref 3.5–5.3)
POTASSIUM SERPL-SCNC: 4.1 MMOL/L — SIGNIFICANT CHANGE UP (ref 3.5–5.3)
PROMYELOCYTES # FLD: 0.9 % — HIGH (ref 0–0)
RBC # BLD: 2.88 M/UL — LOW (ref 3.8–5.2)
RBC # FLD: 16.8 % — HIGH (ref 10.3–14.5)
RBC BLD AUTO: ABNORMAL
RBC BLD AUTO: ABNORMAL
SMUDGE CELLS # BLD: PRESENT — SIGNIFICANT CHANGE UP
SODIUM SERPL-SCNC: 131 MMOL/L — LOW (ref 135–145)
VARIANT LYMPHS # BLD: 0.9 % — SIGNIFICANT CHANGE UP (ref 0–6)
WBC # BLD: 24.76 K/UL — HIGH (ref 3.8–10.5)
WBC # FLD AUTO: 24.76 K/UL — HIGH (ref 3.8–10.5)

## 2023-05-30 PROCEDURE — 99233 SBSQ HOSP IP/OBS HIGH 50: CPT

## 2023-05-30 RX ORDER — SODIUM CHLORIDE 9 MG/ML
1 INJECTION INTRAMUSCULAR; INTRAVENOUS; SUBCUTANEOUS
Qty: 0 | Refills: 0 | DISCHARGE
Start: 2023-05-30

## 2023-05-30 RX ORDER — ENOXAPARIN SODIUM 100 MG/ML
40 INJECTION SUBCUTANEOUS
Qty: 0 | Refills: 0 | DISCHARGE
Start: 2023-05-30

## 2023-05-30 RX ORDER — QUETIAPINE FUMARATE 200 MG/1
12.5 TABLET, FILM COATED ORAL
Qty: 0 | Refills: 0 | DISCHARGE
Start: 2023-05-30

## 2023-05-30 RX ADMIN — Medication 5 MILLIGRAM(S): at 21:05

## 2023-05-30 RX ADMIN — SODIUM CHLORIDE 1 GRAM(S): 9 INJECTION INTRAMUSCULAR; INTRAVENOUS; SUBCUTANEOUS at 05:24

## 2023-05-30 RX ADMIN — QUETIAPINE FUMARATE 12.5 MILLIGRAM(S): 200 TABLET, FILM COATED ORAL at 21:05

## 2023-05-30 RX ADMIN — ENOXAPARIN SODIUM 40 MILLIGRAM(S): 100 INJECTION SUBCUTANEOUS at 21:05

## 2023-05-30 RX ADMIN — Medication 12.5 MILLIGRAM(S): at 17:51

## 2023-05-30 RX ADMIN — SODIUM CHLORIDE 1 GRAM(S): 9 INJECTION INTRAMUSCULAR; INTRAVENOUS; SUBCUTANEOUS at 21:06

## 2023-05-30 RX ADMIN — Medication 25 MILLIGRAM(S): at 05:24

## 2023-05-30 RX ADMIN — Medication 25 MILLIGRAM(S): at 17:51

## 2023-05-30 RX ADMIN — ATORVASTATIN CALCIUM 40 MILLIGRAM(S): 80 TABLET, FILM COATED ORAL at 21:06

## 2023-05-30 RX ADMIN — Medication 12.5 MILLIGRAM(S): at 05:24

## 2023-05-30 RX ADMIN — SODIUM CHLORIDE 1 GRAM(S): 9 INJECTION INTRAMUSCULAR; INTRAVENOUS; SUBCUTANEOUS at 13:46

## 2023-05-30 NOTE — PHYSICAL THERAPY INITIAL EVALUATION ADULT - LEVEL OF INDEPENDENCE: SUPINE/SIT, REHAB EVAL
This is a 39 y.o.  [id number] at 30w6d for routine OB visit. Her Estimated Due Date is 8/3/2023, by Last Menstrual Period    Denies leaking of fluid, vaginal bleeding, or regular contractions. Reports fetal movement.  has occasional [employer] however, not feeling regular contractions.  has had some heart palpitations that she noticed which started 2 weeks ago. States \"I could be sitting down or standing, it is random when it happens, but I have to stop what I am doing because it is uncomfortable. \"      Denies HA, blurred vision or RUQ pain. Taking Prenatal Vitamins: At least 3 times a week    FH: 31cm  FHTs: 150s      Current Outpatient Medications on File Prior to Visit   Medication Sig Dispense Refill    hydrocortisone (ANUSOL-HC) 25 MG suppository Place 1 suppository rectally 2 times daily as needed for Hemorrhoids 24 suppository 2    Docusate Sodium (COLACE PO) Take by mouth      calcium carbonate (TUMS) 500 MG chewable tablet Take 1 tablet by mouth daily      aspirin EC 81 MG EC tablet Take 2 tablets by mouth daily 60 tablet 5    Prenatal MV & Min w/FA-DHA (ONE A DAY PRENATAL PO) Take by mouth       No current facility-administered medications on file prior to visit.        No Known Allergies    OB History    Para Term  AB Living   4 2 2 0 1 2   SAB IAB Ectopic Molar Multiple Live Births   1 0 0 0 0 2       # 1 - Date: 10/16/17, Sex: Female, Weight: 7 lb 1.1 oz (3.205 kg), GA: 39w6d, Delivery: Vaginal, Spontaneous, Apgar1: 8, Apgar5: 9, Living: Living, Birth Comments: Jose    # 2 - Date: 2019, Sex: None, Weight: None, GA: None, Delivery: None, Apgar1: None, Apgar5: None, Living: None, Birth Comments: D&C    # 3 - Date: 21, Sex: Male, Weight: 8 lb 7.1 oz (3.83 kg), GA: 39w1d, Delivery: Vaginal, Spontaneous, Apgar1: 9, Apgar5: 9, Living: Living, Birth Comments: Sylvia Caceres    # 4 - Date: None, Sex: None, Weight: None, GA: None, Delivery: None, Apgar1: None, Apgar5: None, independent

## 2023-05-30 NOTE — PROGRESS NOTE ADULT - ASSESSMENT
89yr woman hx MDS, HTN, HLD admitted with L pubic rami fracture with leukemia.  Palliative Care consulted to assist wit GOC    L Pubic Rami fracture  pain control - denies pain  PMR rec TRAN Rehab    Left Heel Pain  Tylenol for pain  Consider XRay if persistent    MDS  possible progression to Leukemia  noted Oncology input - not a candidate for  aggressive tx  Daughter wishes to continue with  transfusions    SDH  Neursx - no sx    Encounter for Palliative Care  c/o of Left heel pain - see above,  consider Xray     No other symptoms to report  Daughter not ready for hospice, wishes mother to continue with transfusions  Information on outpatient Palliative Services sent by   Plan for TRAN  Palliative to sign off     89yr woman hx MDS, HTN, HLD admitted with L pubic rami fracture with leukemia.  Palliative Care consulted to assist wit GOC    L Pubic Rami fracture  pain control - denies pain  PMR rec TRAN Rehab    Left Heel Pain  Tylenol for pain  reported DTI - wound care per protocol  Consider XRay if persistent    MDS  possible progression to Leukemia  noted Oncology input - not a candidate for  aggressive tx  Daughter wishes to continue with  transfusions    SDH  Neursx - no sx    Encounter for Palliative Care  No other symptoms to report  Daughter not ready for hospice, wishes mother to continue with transfusions  Information on outpatient Palliative Services sent by STEPHANIE  Plan for TRAN  Palliative to sign off

## 2023-05-30 NOTE — PROGRESS NOTE ADULT - SUBJECTIVE AND OBJECTIVE BOX
Subjective: Patient seen and examined at bedside. VSS. No acute events overnight. Reports left heel pain. Denies chest pain, sob, n/v/d      MEDICATIONS  (STANDING):  amitriptyline 25 milliGRAM(s) Oral every 12 hours  atorvastatin 40 milliGRAM(s) Oral at bedtime  enoxaparin Injectable 40 milliGRAM(s) SubCutaneous every 24 hours  lidocaine   4% Patch 1 Patch Transdermal daily  melatonin 5 milliGRAM(s) Oral at bedtime  metoprolol tartrate 12.5 milliGRAM(s) Oral every 12 hours  QUEtiapine 12.5 milliGRAM(s) Oral at bedtime  sodium chloride 1 Gram(s) Oral every 8 hours    MEDICATIONS  (PRN):  acetaminophen     Tablet .. 975 milliGRAM(s) Oral every 6 hours PRN Temp greater or equal to 38C (100.4F), Mild Pain (1 - 3)      Proplene Glycol (Urticaria)  Levaquin (Other)  steroids - numbness to face (Unknown)  COBALT (Urticaria)  Balsam of [place] (Urticaria)        Objective:     ICU Vital Signs Last 24 Hrs  T(C): 37.1 (30 May 2023 11:23), Max: 37.1 (30 May 2023 11:23)  T(F): 98.7 (30 May 2023 11:23), Max: 98.7 (30 May 2023 11:23)  HR: 70 (30 May 2023 11:23) (70 - 82)  BP: 133/68 (30 May 2023 11:23) (113/64 - 141/69)  BP(mean): --  ABP: --  ABP(mean): --  RR: 18 (30 May 2023 11:23) (18 - 18)  SpO2: 96% (30 May 2023 11:23) (93% - 98%)    O2 Parameters below as of 30 May 2023 11:23  Patient On (Oxygen Delivery Method): room air            I&O's Detail    29 May 2023 07:01  -  30 May 2023 07:00  --------------------------------------------------------  IN:  Total IN: 0 mL    OUT:    Voided (mL): 1050 mL  Total OUT: 1050 mL    Total NET: -1050 mL      30 May 2023 07:01  -  30 May 2023 14:59  --------------------------------------------------------  IN:  Total IN: 0 mL    OUT:    Voided (mL): 700 mL  Total OUT: 700 mL    Total NET: -700 mL          Physical Exam:  General: NAD. Lying comfortably in bed.   HEENT: NCAT. Neck supple. EOMI.   Respiratory: Non labored breathing.   Cardio: RRR  Abdomen: Soft, non-tender, non-distended. No guarding or rebound.   Extremities: No clubbing or cyanosis. Significant tenderness to left heel wound   Neuro: A&O x 3. CNs II-XII grossly intact.                           7.8    24.76 )-----------( 32       ( 30 May 2023 04:30 )             23.8       05-30    131<L>  |  100  |  16.1  ----------------------------<  89  4.1   |  23.0  |  0.69    Ca    9.4      30 May 2023 04:30  Phos  3.7     05-30  Mg     2.0     05-30

## 2023-05-30 NOTE — PROGRESS NOTE ADULT - SUBJECTIVE AND OBJECTIVE BOX
CC:  Follow up GOC , Symptoms    OVERNIGHT EVENTS:  weekend events reviewed  c/o left heel pain    Present Symptoms:   Dyspnea:  No  Nausea/Vomiting:  No    Anxiety:  No    Depression:  No   Fatigue:  Yes     Loss of appetite:  No   Constipation: No    Pain:             Location   Left heel            Duration mainly occurs with pressure            Character  stab            Severity mod            Factors            Effect increased with     Pain AD Score:  [email or web address]/painad.pdf (press ctrl + left click to view)    Review of Systems: Reviewed as above  Further ROS unable to  obtain due to poor  historian      MEDICATIONS  (STANDING):  amitriptyline 25 milliGRAM(s) Oral every 12 hours  atorvastatin 40 milliGRAM(s) Oral at bedtime  enoxaparin Injectable 40 milliGRAM(s) SubCutaneous every 24 hours  lidocaine   4% Patch 1 Patch Transdermal daily  melatonin 5 milliGRAM(s) Oral at bedtime  metoprolol tartrate 12.5 milliGRAM(s) Oral every 12 hours  QUEtiapine 12.5 milliGRAM(s) Oral at bedtime  sodium chloride 1 Gram(s) Oral every 8 hours    MEDICATIONS  (PRN):  acetaminophen     Tablet .. 975 milliGRAM(s) Oral every 6 hours PRN Temp greater or equal to 38C (100.4F), Mild Pain (1 - 3)      PHYSICAL EXAM:    Vital Signs Last 24 Hrs  T(C): 37.1 (30 May 2023 11:23), Max: 37.1 (30 May 2023 11:23)  T(F): 98.7 (30 May 2023 11:23), Max: 98.7 (30 May 2023 11:23)  HR: 70 (30 May 2023 11:23) (70 - 82)  BP: 133/68 (30 May 2023 11:23) (113/64 - 141/69)  BP(mean): --  RR: 18 (30 May 2023 11:23) (18 - 18)  SpO2: 96% (30 May 2023 11:23) (93% - 98%)    Parameters below as of 30 May 2023 11:23  Patient On (Oxygen Delivery Method): room air      [place]: 40 %  General:   F awake alert NAD      HEENT:  NCAT     Lungs: comfortable  non labored  CV:  RR  GI:  soft NTND  MSK: no contractures.  Left heel - some tenderness on palpation,  NO swelling or skin breakdown. + pulses  Skin:  warm/dry  Neuro  no focal deficits  Psych calm    LABS:                          7.8    24.76 )-----------( 32       ( 30 May 2023 04:30 )             23.8     05-30    131<L>  |  100  |  16.1  ----------------------------<  89  4.1   |  23.0  |  0.69    Ca    9.4      30 May 2023 04:30  Phos  3.7     05-30  Mg     2.0     05-30          I&O's Summary    29 May 2023 07:01  -  30 May 2023 07:00  --------------------------------------------------------  IN: 0 mL / OUT: 1050 mL / NET: -1050 mL    30 May 2023 07:01  -  30 May 2023 15:02  --------------------------------------------------------  IN: 0 mL / OUT: 700 mL / NET: -700 mL        RADIOLOGY & ADDITIONAL STUDIES:  Imaging Reviewed  ( x  )     < from: Xray Knee 3 Views, Left (05.23.23 @ 12:56) >    ACC: [id number] EXAM:  XR FEMUR 2 VIEWS LT   ORDERED BY: DIEUDONNE ESTEBAN     ACC: [id number] EXAM:  XR KNEE AP LAT OBL 3 VIEWS LT   ORDERED BY: DIEUDONNE ESTEBAN     ACC: [id number] EXAM:  XR HIP 2-3V LT   ORDERED BY: DIEUDONNE ESTEBAN     PROCEDURE DATE:  05/23/2023          INTERPRETATION:  Left hip, left femur, and left knee. Patient fell with   local trauma.    Left hip. 2 views. Arterial calcifications noted.    There is mild degeneration diffusely around the left hip. No fracture.    Left femur. 2 views of the lower left femur show no fracture.    Left knee. AP and lateral views. Arterial calcifications. Minimal   effusion. Moderate to advanced degeneration.    IMPRESSION: Degenerative findings as above. No fracture.    --- End of Report ---      < end of copied text >        ADVANCE DIRECTIVE PREFERENCES    Full Code   CC:  Follow up GOC , Symptoms    OVERNIGHT EVENTS:  weekend events reviewed  c/o left heel pain    Present Symptoms:   Dyspnea:  No  Nausea/Vomiting:  No    Anxiety:  No    Depression:  No   Fatigue:  Yes     Loss of appetite:  No   Constipation: No    Pain:             Location   Left heel            Duration mainly occurs with pressure            Character  stab            Severity mod            Factors            Effect increased with     Pain AD Score:  http://geriatrictoolkit.[email or web address]/painad.pdf (press ctrl + left click to view)    Review of Systems: Reviewed as above  Further ROS unable to  obtain due to poor  historian      MEDICATIONS  (STANDING):  amitriptyline 25 milliGRAM(s) Oral every 12 hours  atorvastatin 40 milliGRAM(s) Oral at bedtime  enoxaparin Injectable 40 milliGRAM(s) SubCutaneous every 24 hours  lidocaine   4% Patch 1 Patch Transdermal daily  melatonin 5 milliGRAM(s) Oral at bedtime  metoprolol tartrate 12.5 milliGRAM(s) Oral every 12 hours  QUEtiapine 12.5 milliGRAM(s) Oral at bedtime  sodium chloride 1 Gram(s) Oral every 8 hours    MEDICATIONS  (PRN):  acetaminophen     Tablet .. 975 milliGRAM(s) Oral every 6 hours PRN Temp greater or equal to 38C (100.4F), Mild Pain (1 - 3)      PHYSICAL EXAM:    Vital Signs Last 24 Hrs  T(C): 37.1 (30 May 2023 11:23), Max: 37.1 (30 May 2023 11:23)  T(F): 98.7 (30 May 2023 11:23), Max: 98.7 (30 May 2023 11:23)  HR: 70 (30 May 2023 11:23) (70 - 82)  BP: 133/68 (30 May 2023 11:23) (113/64 - 141/69)  BP(mean): --  RR: 18 (30 May 2023 11:23) (18 - 18)  SpO2: 96% (30 May 2023 11:23) (93% - 98%)    Parameters below as of 30 May 2023 11:23  Patient On (Oxygen Delivery Method): room air      [place]: 40 %  General:   F awake alert NAD      HEENT:  NCAT     Lungs: comfortable  non labored  CV:  RR  GI:  soft NTND  MSK: no contractures.  Left heel - some tenderness on palpation, + pulses  Skin:  warm/dry  Neuro  no focal deficits  Psych calm    LABS:                          7.8    24.76 )-----------( 32       ( 30 May 2023 04:30 )             23.8     05-30    131<L>  |  100  |  16.1  ----------------------------<  89  4.1   |  23.0  |  0.69    Ca    9.4      30 May 2023 04:30  Phos  3.7     05-30  Mg     2.0     05-30          I&O's Summary    29 May 2023 07:01  -  30 May 2023 07:00  --------------------------------------------------------  IN: 0 mL / OUT: 1050 mL / NET: -1050 mL    30 May 2023 07:01  -  30 May 2023 15:02  --------------------------------------------------------  IN: 0 mL / OUT: 700 mL / NET: -700 mL        RADIOLOGY & ADDITIONAL STUDIES:  Imaging Reviewed  ( x  )     < from: Xray Knee 3 Views, Left (05.23.23 @ 12:56) >    ACC: [id number] EXAM:  XR FEMUR 2 VIEWS LT   ORDERED BY: DIEUDONNE ESTEBAN     ACC: [id number] EXAM:  XR KNEE AP LAT OBL 3 VIEWS LT   ORDERED BY: DIEUDONNE ESTEBAN     ACC: [id number] EXAM:  XR HIP 2-3V LT   ORDERED BY: DIEUDONNE ESTEBAN     PROCEDURE DATE:  05/23/2023          INTERPRETATION:  Left hip, left femur, and left knee. Patient fell with   local trauma.    Left hip. 2 views. Arterial calcifications noted.    There is mild degeneration diffusely around the left hip. No fracture.    Left femur. 2 views of the lower left femur show no fracture.    Left knee. AP and lateral views. Arterial calcifications. Minimal   effusion. Moderate to advanced degeneration.    IMPRESSION: Degenerative findings as above. No fracture.    --- End of Report ---      < end of copied text >        ADVANCE DIRECTIVE PREFERENCES    Full Code

## 2023-05-30 NOTE — PROGRESS NOTE ADULT - SUBJECTIVE AND OBJECTIVE BOX
Patient continues to be confused.  1:1 has been removed.     FUNCTIONAL PROGRESS  5/29 PT  Bed Mobility  Bed Mobility Training Sit-to-Supine: minimum assist (75% patient effort);  nonverbal cues (demo/gestures);  verbal cues;  1 person assist;  bed rails  Bed Mobility Training Supine-to-Sit: minimum assist (75% patient effort);  1 person assist;  nonverbal cues (demo/gestures);  verbal cues;  bed rails  Bed Mobility Training Limitations: decreased strength;  decreased ability to use legs for bridging/pushing;  pain    Sit-Stand Transfer Training  Transfer Training Sit-to-Stand Transfer: minimum assist (75% patient effort);  1 person assist;  nonverbal cues (demo/gestures);  verbal cues;  full weight-bearing   rolling walker  Transfer Training Stand-to-Sit Transfer: minimum assist (75% patient effort);  1 person assist;  nonverbal cues (demo/gestures);  verbal cues;  full weight-bearing   rolling walker  Sit-to-Stand Transfer Training Transfer Safety Analysis: decreased weight-shifting ability;  decreased strength;  pain    Gait Training  Gait Training Symptoms Noted During/After Treatment: unable due to 9/10 L heel pain    5/24 OT  Bathing Training:   · Level of [place]	minimum assist (75% patients effort)  · Physical Assist/Nonphysical Assist	1 person assist    Upper Body Dressing Training:   · Level of [place]	minimum assist (75% patients effort); to don gown  · Physical Assist/Nonphysical Assist	1 person assist    Lower Body Dressing Training:   · Level of [place]	maximum assist (25% patients effort); to don socks  · Physical Assist/Nonphysical Assist	1 person assist    Toilet Hygiene Training:   · Level of [place]	maximum assist (25% patients effort); secondary to purewick  · Physical Assist/Nonphysical Assist	1 person assist    Grooming Training:   · Level of [place]	supervision    Eating/Self-Feeding Training:   · Level of [place]	supervision  · Physical Assist/Nonphysical Assist	supervision      VITALS  T(C): 36.5 (05-30-23 @ 04:26), Max: 37 (05-29-23 @ 14:57)  HR: 71 (05-30-23 @ 04:26) (71 - 82)  BP: 135/61 (05-30-23 @ 04:26) (113/64 - 141/69)  RR: 18 (05-30-23 @ 04:26) (18 - 18)  SpO2: 93% (05-30-23 @ 04:26) (93% - 98%)  Wt(kg): --    MEDICATIONS   acetaminophen     Tablet .. 975 milliGRAM(s) every 6 hours PRN  amitriptyline 25 milliGRAM(s) every 12 hours  atorvastatin 40 milliGRAM(s) at bedtime  enoxaparin Injectable 40 milliGRAM(s) every 24 hours  lidocaine   4% Patch 1 Patch daily  melatonin 5 milliGRAM(s) at bedtime  metoprolol tartrate 12.5 milliGRAM(s) every 12 hours  QUEtiapine 12.5 milliGRAM(s) at bedtime  sodium chloride 1 Gram(s) every 8 hours      RECENT LABS/IMAGING  - Reviewed Today                        7.8    24.76 )-----------( 32       ( 30 May 2023 04:30 )             23.8     05-30    131<L>  |  100  |  16.1  ----------------------------<  89  4.1   |  23.0  |  0.69    Ca    9.4      30 May 2023 04:30  Phos  3.7     05-30  Mg     2.0     05-30                HEAD CT:  Thin parafalcine subdural hematoma is stable in size and appearance.    CERVICAL SPINE CT:  No evidence of an acute cervical spine fracture.    CT PELVIS ONLY - 1.  Acute fracture of the left inferior pubic ramus with stellate fracture lines extending into the ischium. Suspect nondisplaced fracture of the left superior pubic ramus, although evaluation is limited by osteopenia. 2.  Moderate degenerative arthritis of the hips. Multilevel lower lumbar spondylosis. 3.  Extensive vascular atherosclerosis.    ----------------------------------------------------------------------------------------  PHYSICAL EXAM  Constitutional - NAD, Comfortable  Extremities - No C/C/E, No calf tenderness   Neurologic Exam -                    Cognitive - AAOx self, NOT situation      Communication - Expressive deficits     Motor - No focal deficits  Psychiatric - Mood stable, Affect WNL  ----------------------------------------------------------------------------------------  ASSESSMENT/PLAN  89yFemale with functional deficits after mechanical fall sustaining trauma  Traumatic SDH -  Stable  Left inferior pubic ramus Fracture - WBAT   HypoNa+ - NaCl, 1L fluid restriction   CAD - Lipitor   HTN - Lopressor  Mood - Elavil, Seroquel 12.5mg HS (5/26)  Sleep - Melatonin   Pain - Tylenol, Lidoderm  DVT PPX - SCDs, Lovenox  Rehab/Impaired mobility and function - Patient continues to require hospitalization for the above diagnoses and ongoing active management of comorbid complications (memory impairments, hyponatremia) that are substantially impairing functional ability and impairing quality of life.      Rehab/Impaired mobility and function - Based on the patient's diagnosis, functional status and potential for progress, living alone with her current medical complications, recommend TRAN, patient DOES NOT meet acute inpatient rehabilitation criteria. Patient needs a more prolonged stay to achieve transition to community living and would not be able to tolerate a comprehensive/intense rehab program of 3hours/day.     Will sign off at this time. Thank you for allowing me to be part of your patient's care. Please reconsult PMR for additional rehab recommendations or dispo needs if functional status changes. Discussed the specific management and recommendations above with rehab clinical care team/rehab liaison.

## 2023-05-30 NOTE — PROGRESS NOTE ADULT - ATTENDING COMMENTS
Patient admitted s/p GLF w/ SDH (stable on repeat scans), L pubi rami frxs, gluteal hematoma.   Ortho recs appreciated (nonOp; WBAT).   NSG recs appreciated.   H/H stable in setting of gluteal hematoma.   Persistent leukocytosis in setting of leukemia (oncology recs appreciated).     Clinicall stable on exam this AM. Reported L heel pain; noted to have severe TTP. L foot xray with evidence of spur, no obvious frx. Patient denies hx of L foot/heel pain/TTP prior to injury.   --Will obtain MRI to evaluate potential occult fracture.     --MMPR. Patient admitted s/p GLF w/ SDH (stable on repeat scans), L pubi rami frxs, gluteal hematoma.   Ortho recs appreciated (nonOp; WBAT).   NSG recs appreciated.   H/H stable in setting of gluteal hematoma.   Persistent leukocytosis in setting of leukemia (oncology recs appreciated).     **Clinicall stable on exam this AM. Reported L heel pain; noted to have severe TTP. L foot xray with evidence of spur, no obvious frx. Patient denies hx of L foot/heel pain/TTP prior to injury.   --Will obtain MRI to evaluate potential occult fracture.     **Hyponatremia responding appropriately to salt tabs and fluid restriction. Continue to monitor BMP, strict I&Os.     --MMPR.

## 2023-05-30 NOTE — PROGRESS NOTE ADULT - ASSESSMENT
A: Patient is a 89 yo F s/p fall with SDH, L sup/inf rami fx, gluteal hematoma, s/p 1 plt and 2u prbc transfusions, hemodynamically stable, afebrile.     Plan:   Palliative following- not ready for hospice   Hyponatremia- salt tabs TID, fluid restriction  DVT ppx   Hem/onc following   Regular diet   Ortho- WBAT   Pain control   F/u Neurosurgery as outpatient  Monitor delirium- maintain sleep/wake cycle   DC planning to acute rehab - off 1:1

## 2023-05-31 LAB
ANION GAP SERPL CALC-SCNC: 12 MMOL/L — SIGNIFICANT CHANGE UP (ref 5–17)
ANISOCYTOSIS BLD QL: SLIGHT — SIGNIFICANT CHANGE UP
ANISOCYTOSIS BLD QL: SLIGHT — SIGNIFICANT CHANGE UP
BASOPHILS # BLD AUTO: 0 K/UL — SIGNIFICANT CHANGE UP (ref 0–0.2)
BASOPHILS # BLD AUTO: 0.89 K/UL — HIGH (ref 0–0.2)
BASOPHILS NFR BLD AUTO: 0 % — SIGNIFICANT CHANGE UP (ref 0–2)
BASOPHILS NFR BLD AUTO: 3.6 % — HIGH (ref 0–2)
BLASTS # FLD: 3.5 % — HIGH (ref 0–0)
BLASTS # FLD: 6.3 % — HIGH (ref 0–0)
BUN SERPL-MCNC: 15.1 MG/DL — SIGNIFICANT CHANGE UP (ref 8–20)
CALCIUM SERPL-MCNC: 9.3 MG/DL — SIGNIFICANT CHANGE UP (ref 8.4–10.5)
CHLORIDE SERPL-SCNC: 96 MMOL/L — SIGNIFICANT CHANGE UP (ref 96–108)
CO2 SERPL-SCNC: 20 MMOL/L — LOW (ref 22–29)
CREAT SERPL-MCNC: 0.72 MG/DL — SIGNIFICANT CHANGE UP (ref 0.5–1.3)
EGFR: 79 ML/MIN/1.73M2 — SIGNIFICANT CHANGE UP
EOSINOPHIL # BLD AUTO: 0.22 K/UL — SIGNIFICANT CHANGE UP (ref 0–0.5)
EOSINOPHIL # BLD AUTO: 1.42 K/UL — HIGH (ref 0–0.5)
EOSINOPHIL NFR BLD AUTO: 0.9 % — SIGNIFICANT CHANGE UP (ref 0–6)
EOSINOPHIL NFR BLD AUTO: 4.4 % — SIGNIFICANT CHANGE UP (ref 0–6)
GLUCOSE SERPL-MCNC: 97 MG/DL — SIGNIFICANT CHANGE UP (ref 70–99)
HCT VFR BLD CALC: 22.9 % — LOW (ref 34.5–45)
HGB BLD-MCNC: 7.6 G/DL — LOW (ref 11.5–15.5)
LYMPHOCYTES # BLD AUTO: 2.45 K/UL — SIGNIFICANT CHANGE UP (ref 1–3.3)
LYMPHOCYTES # BLD AUTO: 20.9 % — SIGNIFICANT CHANGE UP (ref 13–44)
LYMPHOCYTES # BLD AUTO: 6.73 K/UL — HIGH (ref 1–3.3)
LYMPHOCYTES # BLD AUTO: 9.9 % — LOW (ref 13–44)
LYMPHOCYTES # SPEC AUTO: 2.6 % — HIGH (ref 0–0)
MAGNESIUM SERPL-MCNC: 1.8 MG/DL — SIGNIFICANT CHANGE UP (ref 1.6–2.6)
MANUAL SMEAR VERIFICATION: SIGNIFICANT CHANGE UP
MANUAL SMEAR VERIFICATION: SIGNIFICANT CHANGE UP
MCHC RBC-ENTMCNC: 27.6 PG — SIGNIFICANT CHANGE UP (ref 27–34)
MCHC RBC-ENTMCNC: 33.2 GM/DL — SIGNIFICANT CHANGE UP (ref 32–36)
MCV RBC AUTO: 83.3 FL — SIGNIFICANT CHANGE UP (ref 80–100)
METAMYELOCYTES # FLD: 5.2 % — HIGH (ref 0–0)
METAMYELOCYTES # FLD: 5.4 % — HIGH (ref 0–0)
MICROCYTES BLD QL: SLIGHT — SIGNIFICANT CHANGE UP
MICROCYTES BLD QL: SLIGHT — SIGNIFICANT CHANGE UP
MONOCYTES # BLD AUTO: 0.22 K/UL — SIGNIFICANT CHANGE UP (ref 0–0.9)
MONOCYTES # BLD AUTO: 0.55 K/UL — SIGNIFICANT CHANGE UP (ref 0–0.9)
MONOCYTES NFR BLD AUTO: 0.9 % — LOW (ref 2–14)
MONOCYTES NFR BLD AUTO: 1.7 % — LOW (ref 2–14)
MYELOCYTES NFR BLD: 10.4 % — HIGH (ref 0–0)
MYELOCYTES NFR BLD: 10.8 % — HIGH (ref 0–0)
NEUTROPHILS # BLD AUTO: 14.73 K/UL — HIGH (ref 1.8–7.4)
NEUTROPHILS # BLD AUTO: 16.52 K/UL — HIGH (ref 1.8–7.4)
NEUTROPHILS NFR BLD AUTO: 47 % — SIGNIFICANT CHANGE UP (ref 43–77)
NEUTROPHILS NFR BLD AUTO: 53.2 % — SIGNIFICANT CHANGE UP (ref 43–77)
NEUTS BAND # BLD: 4.3 % — SIGNIFICANT CHANGE UP (ref 0–8)
NEUTS BAND # BLD: 6.3 % — SIGNIFICANT CHANGE UP (ref 0–8)
NRBC # BLD: 4 /100 — HIGH (ref 0–0)
NRBC # BLD: 5 /100 — HIGH (ref 0–0)
OVALOCYTES BLD QL SMEAR: SLIGHT — SIGNIFICANT CHANGE UP
OVALOCYTES BLD QL SMEAR: SLIGHT — SIGNIFICANT CHANGE UP
PHOSPHATE SERPL-MCNC: 3.2 MG/DL — SIGNIFICANT CHANGE UP (ref 2.4–4.7)
PLAT MORPH BLD: NORMAL — SIGNIFICANT CHANGE UP
PLAT MORPH BLD: NORMAL — SIGNIFICANT CHANGE UP
PLATELET # BLD AUTO: 46 K/UL — LOW (ref 150–400)
POIKILOCYTOSIS BLD QL AUTO: SLIGHT — SIGNIFICANT CHANGE UP
POIKILOCYTOSIS BLD QL AUTO: SLIGHT — SIGNIFICANT CHANGE UP
POLYCHROMASIA BLD QL SMEAR: SIGNIFICANT CHANGE UP
POLYCHROMASIA BLD QL SMEAR: SIGNIFICANT CHANGE UP
POTASSIUM SERPL-MCNC: 4.2 MMOL/L — SIGNIFICANT CHANGE UP (ref 3.5–5.3)
POTASSIUM SERPL-SCNC: 4.2 MMOL/L — SIGNIFICANT CHANGE UP (ref 3.5–5.3)
PROMYELOCYTES # FLD: 1.8 % — HIGH (ref 0–0)
RBC # BLD: 2.75 M/UL — LOW (ref 3.8–5.2)
RBC # FLD: 16.8 % — HIGH (ref 10.3–14.5)
RBC BLD AUTO: ABNORMAL
RBC BLD AUTO: ABNORMAL
SMUDGE CELLS # BLD: PRESENT — SIGNIFICANT CHANGE UP
SMUDGE CELLS # BLD: PRESENT — SIGNIFICANT CHANGE UP
SODIUM SERPL-SCNC: 128 MMOL/L — LOW (ref 135–145)
VARIANT LYMPHS # BLD: 0.9 % — SIGNIFICANT CHANGE UP (ref 0–6)
WBC # BLD: 32.21 K/UL — HIGH (ref 3.8–10.5)
WBC # FLD AUTO: 32.21 K/UL — HIGH (ref 3.8–10.5)

## 2023-05-31 PROCEDURE — 73718 MRI LOWER EXTREMITY W/O DYE: CPT | Mod: 26,LT

## 2023-05-31 RX ADMIN — SODIUM CHLORIDE 1 GRAM(S): 9 INJECTION INTRAMUSCULAR; INTRAVENOUS; SUBCUTANEOUS at 15:25

## 2023-05-31 RX ADMIN — SODIUM CHLORIDE 1 GRAM(S): 9 INJECTION INTRAMUSCULAR; INTRAVENOUS; SUBCUTANEOUS at 22:26

## 2023-05-31 RX ADMIN — Medication 25 MILLIGRAM(S): at 05:42

## 2023-05-31 RX ADMIN — ENOXAPARIN SODIUM 40 MILLIGRAM(S): 100 INJECTION SUBCUTANEOUS at 22:27

## 2023-05-31 RX ADMIN — Medication 5 MILLIGRAM(S): at 22:26

## 2023-05-31 RX ADMIN — Medication 12.5 MILLIGRAM(S): at 05:42

## 2023-05-31 RX ADMIN — Medication 12.5 MILLIGRAM(S): at 17:36

## 2023-05-31 RX ADMIN — SODIUM CHLORIDE 1 GRAM(S): 9 INJECTION INTRAMUSCULAR; INTRAVENOUS; SUBCUTANEOUS at 05:42

## 2023-05-31 RX ADMIN — QUETIAPINE FUMARATE 12.5 MILLIGRAM(S): 200 TABLET, FILM COATED ORAL at 22:26

## 2023-05-31 RX ADMIN — ATORVASTATIN CALCIUM 40 MILLIGRAM(S): 80 TABLET, FILM COATED ORAL at 22:26

## 2023-05-31 NOTE — PROGRESS NOTE ADULT - ASSESSMENT
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 CT at [place] showed a Left Superior and inferior pubic rami fracture. SDH intrahemishere region on CT head. Repeat CTH stable.    1) MDS  recent flow cytometry on blood showed 13% blasts  ? transformation to leukemia  given age and comorbidities not a candidate for aggressive therapy  c/w supportive transfusions  keep hgb > 7, platelet count > 50k  Patient should keep appointment with Hematology upon discharge (would recommend follow-up clinic appointment with Hematology within 1 week upon discharge)  If patient is discharged to rehab, should perform weekly CBC  Palliative care eval    2) L hip fracture  ortho following  conservative measures  plan for rehab    3) SDH  repeat CTH stable  neurosx following  goal plt > 70-80k, given BM involved this may not be achieved with transfusions   plt count 38,000 today    4) DVT ppx  lovenox as long as plt count > 30,000    further mx as per primary team

## 2023-05-31 NOTE — PROGRESS NOTE ADULT - SUBJECTIVE AND OBJECTIVE BOX
No acute ON events    Gen - confused  Heart - S1S2 RRR  Lung - Dec BS B/L  Abd - soft ND NT  eXT - NO EDEMA    Vital Signs Last 24 Hrs  T(C): 37.1 (31 May 2023 10:59), Max: 37.2 (30 May 2023 19:56)  T(F): 98.7 (31 May 2023 10:59), Max: 99 (30 May 2023 19:56)  HR: 78 (31 May 2023 10:59) (70 - 81)  BP: 154/64 (31 May 2023 10:59) (125/70 - 154/64)  BP(mean): --  RR: 18 (31 May 2023 10:59) (17 - 18)  SpO2: 98% (31 May 2023 10:59) (94% - 98%)  Parameters below as of 31 May 2023 10:59  Patient On (Oxygen Delivery Method): room air    MEDICATIONS  (STANDING):  atorvastatin 40 milliGRAM(s) Oral at bedtime  enoxaparin Injectable 40 milliGRAM(s) SubCutaneous every 24 hours  lidocaine   4% Patch 1 Patch Transdermal daily  melatonin 5 milliGRAM(s) Oral at bedtime  metoprolol tartrate 12.5 milliGRAM(s) Oral every 12 hours  QUEtiapine 12.5 milliGRAM(s) Oral at bedtime  sodium chloride 1 Gram(s) Oral every 8 hours    CBC:            7.6    32.21 )-----------( 46       ( 05-31-23 @ 04:30 )             22.9         Chem:         ( 05-31-23 @ 04:30 )    128<L>  |  96  |  15.1  ----------------------------<  97  4.2   |  20.0<L>  |  0.72        Liver Functions:     Type & Screen:

## 2023-05-31 NOTE — PROGRESS NOTE ADULT - SUBJECTIVE AND OBJECTIVE BOX
SUBJECTIVE/24 hour events:  Patient is a 89yFemale s/p fall sustaining SDH, non-op left superior/inferior rami fracture, and gluteal hematoma. Found to have hyponatremia on bmp, urine lytes sent and SIADH picture, placed on salt tabs and slowly resolving, now 131 from 128. Off the 1:1 . Heme-onc following for history of thrombocytopenia, would like platelets above 80, now in the 30's we are going to refrain for now with additional transfusions had 2 units of platelets transfused with minor improvement . Patient received 2 units PRBC in total for this hemoglobin of 7.0 now 7.8. Patient has been complaining of left posterior  heel pain, xray obtained showing Posterior superior calcaneal spur 2/2 to persistent c/o radiology recommending MRI of the Left foot to r/o calcaneal fx. , .Patient with no acute events overnight, remained calm, and slept through the night. Patient dispo to [facility] once medically stable       Vital Signs Last 24 Hrs  T(C): 37.2 (30 May 2023 19:56), Max: 37.2 (30 May 2023 19:56)  T(F): 99 (30 May 2023 19:56), Max: 99 (30 May 2023 19:56)  HR: 81 (30 May 2023 19:56) (70 - 81)  BP: 127/66 (30 May 2023 19:56) (127/66 - 135/61)  BP(mean): --  RR: 18 (30 May 2023 19:56) (18 - 18)  SpO2: 98% (30 May 2023 19:56) (93% - 98%)    Parameters below as of 30 May 2023 19:56  Patient On (Oxygen Delivery Method): room air      Drug Dosing Weight  Height (cm): 154.9 (23 May 2023 16:36)  Weight (kg): 55.8 (23 May 2023 16:36)  BMI (kg/m2): 23.3 (23 May 2023 16:36)  BSA (m2): 1.54 (23 May 2023 16:36)  I&O's Detail    29 May 2023 07:01  -  30 May 2023 07:00  --------------------------------------------------------  IN:  Total IN: 0 mL    OUT:    Voided (mL): 1050 mL  Total OUT: 1050 mL    Total NET: -1050 mL      30 May 2023 07:01  -  31 May 2023 00:36  --------------------------------------------------------  IN:  Total IN: 0 mL    OUT:    Voided (mL): 700 mL  Total OUT: 700 mL    Total NET: -700 mL        Allergies    Proplene Glycol (Urticaria)  steroids - numbness to face (Unknown)  COBALT (Urticaria)  Balsam of [place] (Urticaria)    Intolerances    Levaquin (Other)                            7.8    24.76 )-----------( 32       ( 30 May 2023 04:30 )             23.8   05-30    131<L>  |  100  |  16.1  ----------------------------<  89  4.1   |  23.0  |  0.69    Ca    9.4      30 May 2023 04:30  Phos  3.7     05-30  Mg     2.0     05-30        ROS:    Physical Exam:  General: resting  comfortably in bed.   HEENT: NCAT. Neck supple. EOMI.   Respiratory: Non labored breathing.   Cardio: RRR  Abdomen: Soft, non-tender, non-distended. No guarding or rebound.   Extremities: No clubbing or cyanosis. Significant tenderness to left heel wound   Neuro: A&O x 3. CNs II-XII grossly intact.         MEDICATIONS  (STANDING):  amitriptyline 25 milliGRAM(s) Oral every 12 hours  atorvastatin 40 milliGRAM(s) Oral at bedtime  enoxaparin Injectable 40 milliGRAM(s) SubCutaneous every 24 hours  lidocaine   4% Patch 1 Patch Transdermal daily  melatonin 5 milliGRAM(s) Oral at bedtime  metoprolol tartrate 12.5 milliGRAM(s) Oral every 12 hours  QUEtiapine 12.5 milliGRAM(s) Oral at bedtime  sodium chloride 1 Gram(s) Oral every 8 hours    MEDICATIONS  (PRN):  acetaminophen     Tablet .. 975 milliGRAM(s) Oral every 6 hours PRN Temp greater or equal to 38C (100.4F), Mild Pain (1 - 3)      RADIOLOGY STUDIES:    CULTURES:         SUBJECTIVE/24 hour events:  Patient is a 89yFemale s/p fall sustaining SDH, non-op left superior/inferior rami fracture, and gluteal hematoma. Found to have hyponatremia on bmp, urine lytes sent and SIADH picture, placed on salt tabs and slowly resolving, now 131 from 128. Off the 1:1 . Heme-onc following for history of thrombocytopenia, would like platelets above 80, now in the 30's we are going to refrain for now with additional transfusions had 2 units of platelets transfused with minor improvement . Patient received 2 units PRBC in total for this admission, hemoglobin stable at 7.8. Patient has been complaining of left posterior  heel pain, xray obtained showing Posterior superior calcaneal spur. 2/2 to persistent c/o pain radiology recommending MRI of the Left foot to r/o calcaneal fx. , .Patient with no acute events overnight, remained calm, and slept through the night. Patient dispo to [facility] once medically stable       Vital Signs Last 24 Hrs  T(C): 37.2 (30 May 2023 19:56), Max: 37.2 (30 May 2023 19:56)  T(F): 99 (30 May 2023 19:56), Max: 99 (30 May 2023 19:56)  HR: 81 (30 May 2023 19:56) (70 - 81)  BP: 127/66 (30 May 2023 19:56) (127/66 - 135/61)  BP(mean): --  RR: 18 (30 May 2023 19:56) (18 - 18)  SpO2: 98% (30 May 2023 19:56) (93% - 98%)    Parameters below as of 30 May 2023 19:56  Patient On (Oxygen Delivery Method): room air      Drug Dosing Weight  Height (cm): 154.9 (23 May 2023 16:36)  Weight (kg): 55.8 (23 May 2023 16:36)  BMI (kg/m2): 23.3 (23 May 2023 16:36)  BSA (m2): 1.54 (23 May 2023 16:36)  I&O's Detail    29 May 2023 07:01  -  30 May 2023 07:00  --------------------------------------------------------  IN:  Total IN: 0 mL    OUT:    Voided (mL): 1050 mL  Total OUT: 1050 mL    Total NET: -1050 mL      30 May 2023 07:01  -  31 May 2023 00:36  --------------------------------------------------------  IN:  Total IN: 0 mL    OUT:    Voided (mL): 700 mL  Total OUT: 700 mL    Total NET: -700 mL        Allergies    Proplene Glycol (Urticaria)  steroids - numbness to face (Unknown)  COBALT (Urticaria)  Balsam of [place] (Urticaria)    Intolerances    Levaquin (Other)                            7.8    24.76 )-----------( 32       ( 30 May 2023 04:30 )             23.8   05-30    131<L>  |  100  |  16.1  ----------------------------<  89  4.1   |  23.0  |  0.69    Ca    9.4      30 May 2023 04:30  Phos  3.7     05-30  Mg     2.0     05-30        ROS:    Physical Exam:  General: resting  comfortably in bed.   HEENT: NCAT. Neck supple. EOMI.   Respiratory: Non labored breathing.   Cardio: RRR  Abdomen: Soft, non-tender, non-distended. No guarding or rebound.   Extremities: No clubbing or cyanosis. Significant tenderness to left heel wound   Neuro: A&O x 3. CNs II-XII grossly intact.         MEDICATIONS  (STANDING):  amitriptyline 25 milliGRAM(s) Oral every 12 hours  atorvastatin 40 milliGRAM(s) Oral at bedtime  enoxaparin Injectable 40 milliGRAM(s) SubCutaneous every 24 hours  lidocaine   4% Patch 1 Patch Transdermal daily  melatonin 5 milliGRAM(s) Oral at bedtime  metoprolol tartrate 12.5 milliGRAM(s) Oral every 12 hours  QUEtiapine 12.5 milliGRAM(s) Oral at bedtime  sodium chloride 1 Gram(s) Oral every 8 hours    MEDICATIONS  (PRN):  acetaminophen     Tablet .. 975 milliGRAM(s) Oral every 6 hours PRN Temp greater or equal to 38C (100.4F), Mild Pain (1 - 3)      RADIOLOGY STUDIES:    CULTURES:

## 2023-05-31 NOTE — PROGRESS NOTE ADULT - ATTENDING COMMENTS
Patient is a 89yFemale s/p fall sustaining SDH, non-op left superior/inferior rami fracture, and gluteal hematoma. Found to have hyponatremia on bmp, urine lytes sent and SIADH picture, placed on salt tabs and slowly resolving, now 131 from 128. Off the 1:1 . Heme-onc following for history of thrombocytopenia, would like platelets above 80, now in the 30's we are going to refrain for now with additional transfusions had 2 units of platelets transfused with minor improvement . Patient received 2 units PRBC in total for this admission, hemoglobin stable at 7.8. Patient has been complaining of left posterior  heel pain, xray obtained showing Posterior superior calcaneal spur. 2/2 to persistent c/o pain radiology recommending MRI of the Left foot to r/o calcaneal fx. ,   Awake, unchanged  Bilateral BS  Hemodynamic intact  Abdomen soft, active BS, diet tolerated  Neurologic grossly intact

## 2023-06-01 LAB
ANION GAP SERPL CALC-SCNC: 11 MMOL/L — SIGNIFICANT CHANGE UP (ref 5–17)
BASOPHILS # BLD AUTO: 0.1 K/UL — SIGNIFICANT CHANGE UP (ref 0–0.2)
BASOPHILS NFR BLD AUTO: 0.3 % — SIGNIFICANT CHANGE UP (ref 0–2)
BUN SERPL-MCNC: 17.6 MG/DL — SIGNIFICANT CHANGE UP (ref 8–20)
CALCIUM SERPL-MCNC: 9.6 MG/DL — SIGNIFICANT CHANGE UP (ref 8.4–10.5)
CHLORIDE SERPL-SCNC: 96 MMOL/L — SIGNIFICANT CHANGE UP (ref 96–108)
CO2 SERPL-SCNC: 21 MMOL/L — LOW (ref 22–29)
CREAT SERPL-MCNC: 0.75 MG/DL — SIGNIFICANT CHANGE UP (ref 0.5–1.3)
EGFR: 76 ML/MIN/1.73M2 — SIGNIFICANT CHANGE UP
EOSINOPHIL # BLD AUTO: 0.04 K/UL — SIGNIFICANT CHANGE UP (ref 0–0.5)
EOSINOPHIL NFR BLD AUTO: 0.1 % — SIGNIFICANT CHANGE UP (ref 0–6)
GLUCOSE SERPL-MCNC: 91 MG/DL — SIGNIFICANT CHANGE UP (ref 70–99)
HCT VFR BLD CALC: 23 % — LOW (ref 34.5–45)
HGB BLD-MCNC: 7.7 G/DL — LOW (ref 11.5–15.5)
IMM GRANULOCYTES NFR BLD AUTO: 25.6 % — HIGH (ref 0–0.9)
LYMPHOCYTES # BLD AUTO: 13.9 % — SIGNIFICANT CHANGE UP (ref 13–44)
LYMPHOCYTES # BLD AUTO: 4.09 K/UL — HIGH (ref 1–3.3)
MAGNESIUM SERPL-MCNC: 2 MG/DL — SIGNIFICANT CHANGE UP (ref 1.8–2.6)
MCHC RBC-ENTMCNC: 27.7 PG — SIGNIFICANT CHANGE UP (ref 27–34)
MCHC RBC-ENTMCNC: 33.5 GM/DL — SIGNIFICANT CHANGE UP (ref 32–36)
MCV RBC AUTO: 82.7 FL — SIGNIFICANT CHANGE UP (ref 80–100)
MONOCYTES # BLD AUTO: 2.42 K/UL — HIGH (ref 0–0.9)
MONOCYTES NFR BLD AUTO: 8.3 % — SIGNIFICANT CHANGE UP (ref 2–14)
NEUTROPHILS # BLD AUTO: 15.15 K/UL — HIGH (ref 1.8–7.4)
NEUTROPHILS NFR BLD AUTO: 51.8 % — SIGNIFICANT CHANGE UP (ref 43–77)
NRBC # BLD: 1 /100 WBCS — HIGH (ref 0–0)
PHOSPHATE SERPL-MCNC: 3.7 MG/DL — SIGNIFICANT CHANGE UP (ref 2.4–4.7)
PLATELET # BLD AUTO: 41 K/UL — LOW (ref 150–400)
POTASSIUM SERPL-MCNC: 4.1 MMOL/L — SIGNIFICANT CHANGE UP (ref 3.5–5.3)
POTASSIUM SERPL-SCNC: 4.1 MMOL/L — SIGNIFICANT CHANGE UP (ref 3.5–5.3)
RBC # BLD: 2.78 M/UL — LOW (ref 3.8–5.2)
RBC # FLD: 16.5 % — HIGH (ref 10.3–14.5)
SARS-COV-2 RNA SPEC QL NAA+PROBE: SIGNIFICANT CHANGE UP
SODIUM SERPL-SCNC: 128 MMOL/L — LOW (ref 135–145)
WBC # BLD: 29.32 K/UL — HIGH (ref 3.8–10.5)
WBC # FLD AUTO: 29.32 K/UL — HIGH (ref 3.8–10.5)

## 2023-06-01 PROCEDURE — 99231 SBSQ HOSP IP/OBS SF/LOW 25: CPT

## 2023-06-01 RX ADMIN — ENOXAPARIN SODIUM 40 MILLIGRAM(S): 100 INJECTION SUBCUTANEOUS at 21:50

## 2023-06-01 RX ADMIN — Medication 12.5 MILLIGRAM(S): at 16:55

## 2023-06-01 RX ADMIN — ATORVASTATIN CALCIUM 40 MILLIGRAM(S): 80 TABLET, FILM COATED ORAL at 21:49

## 2023-06-01 RX ADMIN — SODIUM CHLORIDE 1 GRAM(S): 9 INJECTION INTRAMUSCULAR; INTRAVENOUS; SUBCUTANEOUS at 11:32

## 2023-06-01 RX ADMIN — Medication 5 MILLIGRAM(S): at 21:50

## 2023-06-01 RX ADMIN — SODIUM CHLORIDE 1 GRAM(S): 9 INJECTION INTRAMUSCULAR; INTRAVENOUS; SUBCUTANEOUS at 21:50

## 2023-06-01 RX ADMIN — QUETIAPINE FUMARATE 12.5 MILLIGRAM(S): 200 TABLET, FILM COATED ORAL at 21:49

## 2023-06-01 RX ADMIN — Medication 12.5 MILLIGRAM(S): at 05:36

## 2023-06-01 RX ADMIN — SODIUM CHLORIDE 1 GRAM(S): 9 INJECTION INTRAMUSCULAR; INTRAVENOUS; SUBCUTANEOUS at 05:36

## 2023-06-01 NOTE — PROGRESS NOTE ADULT - SUBJECTIVE AND OBJECTIVE BOX
SUBJECTIVE/24 hour events:   Patient is a 89yFemale s/p fall sustaining SDH, non-op left superior/inferior rami fracture, and gluteal hematoma. Found to have hyponatremia on bmp, urine lytes sent and siadh picture placed on salt tabs , amitriptotyline discontinued as it can cause hyponatremia as well  . Heme-onc following for history of thrombocytopenia, would like platelets above 80, now in the 30's  but now since BM is involved they feel transfusions will not improve platelet count.  Hemoglobin stable at in the high 7's will f/u with heme-onc as an outpatient.. Patient has been complaining of left posterior  MRI completed showing plantar fascitis and all chronic findings .Patient with no acute events overnight, remained calm, and slept through the night. Patient dispo to [facility]      Vital Signs Last 24 Hrs  T(C): 36.9 (31 May 2023 22:03), Max: 37.2 (31 May 2023 15:50)  T(F): 98.4 (31 May 2023 22:03), Max: 98.9 (31 May 2023 15:50)  HR: 70 (31 May 2023 22:03) (70 - 87)  BP: 135/67 (31 May 2023 22:03) (125/70 - 154/64)  BP(mean): --  RR: 18 (31 May 2023 22:03) (17 - 18)  SpO2: 94% (31 May 2023 22:03) (94% - 99%)    Parameters below as of 31 May 2023 22:03  Patient On (Oxygen Delivery Method): room air      Drug Dosing Weight  Height (cm): 154.9 (23 May 2023 16:36)  Weight (kg): 55.8 (23 May 2023 16:36)  BMI (kg/m2): 23.3 (23 May 2023 16:36)  BSA (m2): 1.54 (23 May 2023 16:36)  I&O's Detail    30 May 2023 07:01  -  31 May 2023 07:00  --------------------------------------------------------  IN:  Total IN: 0 mL    OUT:    Voided (mL): 1500 mL  Total OUT: 1500 mL    Total NET: -1500 mL        Allergies    Proplene Glycol (Urticaria)  steroids - numbness to face (Unknown)  COBALT (Urticaria)  Balsam of [place] (Urticaria)    Intolerances    Levaquin (Other)                            7.6    32.21 )-----------( 46       ( 31 May 2023 04:30 )             22.9   05-31    128<L>  |  96  |  15.1  ----------------------------<  97  4.2   |  20.0<L>  |  0.72    Ca    9.3      31 May 2023 04:30  Phos  3.2     05-31  Mg     1.8     05-31        ROS:    Physical Exam:  General: resting  comfortably in bed.   HEENT: NCAT. Neck supple. EOMI.   Respiratory: Non labored breathing.   Cardio: RRR  Abdomen: Soft, non-tender, non-distended. No guarding or rebound.   Extremities: No clubbing or cyanosis. Significant tenderness to left heel wound   Neuro: A&O x 3. CNs II-XII grossly intact.      MEDICATIONS  (STANDING):  atorvastatin 40 milliGRAM(s) Oral at bedtime  enoxaparin Injectable 40 milliGRAM(s) SubCutaneous every 24 hours  lidocaine   4% Patch 1 Patch Transdermal daily  melatonin 5 milliGRAM(s) Oral at bedtime  metoprolol tartrate 12.5 milliGRAM(s) Oral every 12 hours  QUEtiapine 12.5 milliGRAM(s) Oral at bedtime  sodium chloride 1 Gram(s) Oral every 8 hours    MEDICATIONS  (PRN):  acetaminophen     Tablet .. 975 milliGRAM(s) Oral every 6 hours PRN Temp greater or equal to 38C (100.4F), Mild Pain (1 - 3)      RADIOLOGY STUDIES:    CULTURES:

## 2023-06-01 NOTE — PROGRESS NOTE ADULT - ASSESSMENT
A: Patient is a 89 yo F s/p fall with SDH, L sup/inf rami fx, gluteal hematoma, s/p 2plt and 2u prbc transfusions, hemodynamically stable, afebrile.     Plan:   MRI showing all chronic findings  Palliative following- not ready for hospice   Hyponatremia- salt tabs TID, fluid restriction  DVT ppx   Hem/onc following   Regular diet   Ortho- WBAT   Pain control   F/u Neurosurgery as outpatient  Monitor delirium- maintain sleep/wake cycle   DC planning to [place]  - off 1:1

## 2023-06-02 ENCOUNTER — TRANSCRIPTION ENCOUNTER (OUTPATIENT)
Age: 88
End: 2023-06-02

## 2023-06-02 PROCEDURE — 99231 SBSQ HOSP IP/OBS SF/LOW 25: CPT

## 2023-06-02 RX ADMIN — Medication 12.5 MILLIGRAM(S): at 17:28

## 2023-06-02 RX ADMIN — Medication 5 MILLIGRAM(S): at 21:44

## 2023-06-02 RX ADMIN — QUETIAPINE FUMARATE 12.5 MILLIGRAM(S): 200 TABLET, FILM COATED ORAL at 21:43

## 2023-06-02 RX ADMIN — SODIUM CHLORIDE 1 GRAM(S): 9 INJECTION INTRAMUSCULAR; INTRAVENOUS; SUBCUTANEOUS at 05:41

## 2023-06-02 RX ADMIN — SODIUM CHLORIDE 1 GRAM(S): 9 INJECTION INTRAMUSCULAR; INTRAVENOUS; SUBCUTANEOUS at 21:44

## 2023-06-02 RX ADMIN — ENOXAPARIN SODIUM 40 MILLIGRAM(S): 100 INJECTION SUBCUTANEOUS at 21:44

## 2023-06-02 RX ADMIN — ATORVASTATIN CALCIUM 40 MILLIGRAM(S): 80 TABLET, FILM COATED ORAL at 21:43

## 2023-06-02 RX ADMIN — SODIUM CHLORIDE 1 GRAM(S): 9 INJECTION INTRAMUSCULAR; INTRAVENOUS; SUBCUTANEOUS at 12:12

## 2023-06-02 RX ADMIN — Medication 12.5 MILLIGRAM(S): at 05:41

## 2023-06-02 NOTE — PROGRESS NOTE ADULT - SUBJECTIVE AND OBJECTIVE BOX
No acute ON events  off 1:1  DC planning to rehab    Gen - confused  Heart - S1S2 RRR  Lung - Dec BS B/L  Abd - soft ND NT  eXT - NO EDEMA    ICU Vital Signs Last 24 Hrs  T(C): 36.6 (02 Jun 2023 04:21), Max: 37.6 (01 Jun 2023 19:40)  T(F): 97.8 (02 Jun 2023 04:21), Max: 99.6 (01 Jun 2023 19:40)  HR: 77 (02 Jun 2023 04:21) (70 - 77)  BP: 112/63 (02 Jun 2023 04:21) (106/60 - 124/64)  BP(mean): --  ABP: --  ABP(mean): --  RR: 18 (02 Jun 2023 04:21) (16 - 18)  SpO2: 95% (02 Jun 2023 04:21) (95% - 98%)    O2 Parameters below as of 02 Jun 2023 04:21  Patient On (Oxygen Delivery Method): room air        MEDICATIONS  (STANDING):  atorvastatin 40 milliGRAM(s) Oral at bedtime  enoxaparin Injectable 40 milliGRAM(s) SubCutaneous every 24 hours  lidocaine   4% Patch 1 Patch Transdermal daily  melatonin 5 milliGRAM(s) Oral at bedtime  metoprolol tartrate 12.5 milliGRAM(s) Oral every 12 hours  QUEtiapine 12.5 milliGRAM(s) Oral at bedtime  sodium chloride 1 Gram(s) Oral every 8 hours      CBC:                        7.7    29.32 )-----------( 41       ( 01 Jun 2023 04:36 )             23.0                     7.6    32.21 )-----------( 46       ( 05-31-23 @ 04:30 )             22.9     06-01    128<L>  |  96  |  17.6  ----------------------------<  91  4.1   |  21.0<L>  |  0.75    Ca    9.6      01 Jun 2023 04:36  Phos  3.7     06-01  Mg     2.0     06-01        Chem:         ( 05-31-23 @ 04:30 )    128<L>  |  96  |  15.1  ----------------------------<  97  4.2   |  20.0<L>  |  0.72        Liver Functions:     Type & Screen:

## 2023-06-02 NOTE — PROGRESS NOTE ADULT - ASSESSMENT
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 CT at [place] showed a Left Superior and inferior pubic rami fracture. SDH intrahemishere region on CT head. Repeat CTH stable.    1) MDS  recent flow cytometry on blood showed 13% blasts  ? transformation to leukemia  given age and comorbidities not a candidate for aggressive therapy  c/w supportive transfusions  keep hgb > 7, platelet count > 50k  Patient should keep appointment with Hematology upon discharge (would recommend follow-up clinic appointment with Hematology within 1 week upon discharge)  If patient is discharged to rehab, should perform weekly CBC  Palliative care eval    2) L hip fracture  ortho following  conservative measures  plan for rehab    3) SDH  repeat CTH stable  neurosx following  goal plt > 70-80k, given BM involved this may not be achieved with transfusions   plt count 38,000 today    4) DVT ppx  lovenox as long as plt count > 41,000    further mx as per primary team

## 2023-06-02 NOTE — DISCHARGE NOTE NURSING/CASE MANAGEMENT/SOCIAL WORK - NSDCPEFALRISK_GEN_ALL_CORE
For information on Fall & Injury Prevention, visit: [email or web address] OR  [email or web address] OR  https://www.cdc.gov/steadi/patient.html

## 2023-06-02 NOTE — PROGRESS NOTE ADULT - SUBJECTIVE AND OBJECTIVE BOX
SUBJECTIVE/24 hour events:   Patient is a 89yFemale s/p fall sustaining SDH, non-op left superior/inferior rami fracture, and gluteal hematoma. Found to have hyponatremia on bmp, urine lytes sent and siadh picture placed on salt tabs , amitriptotyline discontinued as it can cause hyponatremia as well  . Heme-onc following for history of thrombocytopenia, would like platelets above 80, now in the 30's  but now since BM is involved they feel transfusions will not improve platelet count.  Hemoglobin stable at in the high 7's will f/u with heme-onc as an outpatient.. Patient has been complaining of left posterior  MRI completed showing plantar fascitis and all chronic findings .Patient with no acute events overnight, remained calm, and slept through the night. Patient dispo to [facility]        Vital Signs Last 24 Hrs  T(C): 36.6 (02 Jun 2023 00:00), Max: 37.6 (01 Jun 2023 19:40)  T(F): 97.8 (02 Jun 2023 00:00), Max: 99.6 (01 Jun 2023 19:40)  HR: 76 (02 Jun 2023 00:00) (70 - 79)  BP: 110/62 (02 Jun 2023 00:00) (106/60 - 133/57)  BP(mean): --  RR: 18 (02 Jun 2023 00:00) (16 - 18)  SpO2: 95% (02 Jun 2023 00:00) (95% - 98%)    Parameters below as of 02 Jun 2023 00:00  Patient On (Oxygen Delivery Method): room air      Drug Dosing Weight  Height (cm): 154.9 (23 May 2023 16:36)  Weight (kg): 55.8 (23 May 2023 16:36)  BMI (kg/m2): 23.3 (23 May 2023 16:36)  BSA (m2): 1.54 (23 May 2023 16:36)  I&O's Detail    31 May 2023 07:01  -  01 Jun 2023 07:00  --------------------------------------------------------  IN:  Total IN: 0 mL    OUT:    Voided (mL): 800 mL  Total OUT: 800 mL    Total NET: -800 mL      01 Jun 2023 07:01  -  02 Jun 2023 01:54  --------------------------------------------------------  IN:  Total IN: 0 mL    OUT:    Voided (mL): 500 mL  Total OUT: 500 mL    Total NET: -500 mL        Allergies    Proplene Glycol (Urticaria)  steroids - numbness to face (Unknown)  COBALT (Urticaria)  Balsam of [place] (Urticaria)    Intolerances    Levaquin (Other)                            7.7    29.32 )-----------( 41       ( 01 Jun 2023 04:36 )             23.0   06-01    128<L>  |  96  |  17.6  ----------------------------<  91  4.1   |  21.0<L>  |  0.75    Ca    9.6      01 Jun 2023 04:36  Phos  3.7     06-01  Mg     2.0     06-01        ROS:    PHYSICAL EXAM:  Physical Exam:  General: resting  comfortably in bed.   HEENT: NCAT. Neck supple. EOMI.   Respiratory: Non labored breathing.   Cardio: RRR  Abdomen: Soft, non-tender, non-distended. No guarding or rebound.   Extremities: No clubbing or cyanosis. Significant tenderness to left heel wound   Neuro: A&O x 1.         MEDICATIONS  (STANDING):  atorvastatin 40 milliGRAM(s) Oral at bedtime  enoxaparin Injectable 40 milliGRAM(s) SubCutaneous every 24 hours  lidocaine   4% Patch 1 Patch Transdermal daily  melatonin 5 milliGRAM(s) Oral at bedtime  metoprolol tartrate 12.5 milliGRAM(s) Oral every 12 hours  QUEtiapine 12.5 milliGRAM(s) Oral at bedtime  sodium chloride 1 Gram(s) Oral every 8 hours    MEDICATIONS  (PRN):  acetaminophen     Tablet .. 975 milliGRAM(s) Oral every 6 hours PRN Temp greater or equal to 38C (100.4F), Mild Pain (1 - 3)      RADIOLOGY STUDIES:    CULTURES:

## 2023-06-02 NOTE — DISCHARGE NOTE NURSING/CASE MANAGEMENT/SOCIAL WORK - NSDCFUADDAPPT_GEN_ALL_CORE_FT
Patient will be discharged to the below facility for subacute rehabilitation:    [address] [address]  [phone]

## 2023-06-02 NOTE — DISCHARGE NOTE NURSING/CASE MANAGEMENT/SOCIAL WORK - PATIENT PORTAL LINK FT
You can access the FollowMyHealth Patient Portal offered by [facility] by registering at the following website: [email or web address]. By joining [employer]’s FollowMyHealth portal, you will also be able to view your health information using other applications (apps) compatible with our system.

## 2023-06-02 NOTE — DISCHARGE NOTE NURSING/CASE MANAGEMENT/SOCIAL WORK - CAREGIVER RELATION TO PATIENT
pt c/o rectal bleeding, sent by PMD for colonoscopy in the morning. denies any dizziness, use of blood thinners.
Daughter

## 2023-06-03 PROCEDURE — 99231 SBSQ HOSP IP/OBS SF/LOW 25: CPT | Mod: GC

## 2023-06-03 RX ORDER — OLANZAPINE 15 MG/1
2.5 TABLET, FILM COATED ORAL ONCE
Refills: 0 | Status: DISCONTINUED | OUTPATIENT
Start: 2023-06-03 | End: 2023-06-08

## 2023-06-03 RX ADMIN — ENOXAPARIN SODIUM 40 MILLIGRAM(S): 100 INJECTION SUBCUTANEOUS at 20:40

## 2023-06-03 RX ADMIN — QUETIAPINE FUMARATE 12.5 MILLIGRAM(S): 200 TABLET, FILM COATED ORAL at 20:40

## 2023-06-03 RX ADMIN — Medication 12.5 MILLIGRAM(S): at 05:17

## 2023-06-03 RX ADMIN — Medication 5 MILLIGRAM(S): at 20:40

## 2023-06-03 RX ADMIN — ATORVASTATIN CALCIUM 40 MILLIGRAM(S): 80 TABLET, FILM COATED ORAL at 20:40

## 2023-06-03 RX ADMIN — SODIUM CHLORIDE 1 GRAM(S): 9 INJECTION INTRAMUSCULAR; INTRAVENOUS; SUBCUTANEOUS at 20:40

## 2023-06-03 RX ADMIN — SODIUM CHLORIDE 1 GRAM(S): 9 INJECTION INTRAMUSCULAR; INTRAVENOUS; SUBCUTANEOUS at 05:17

## 2023-06-03 RX ADMIN — SODIUM CHLORIDE 1 GRAM(S): 9 INJECTION INTRAMUSCULAR; INTRAVENOUS; SUBCUTANEOUS at 17:37

## 2023-06-03 RX ADMIN — Medication 12.5 MILLIGRAM(S): at 17:37

## 2023-06-03 NOTE — PROGRESS NOTE ADULT - SUBJECTIVE AND OBJECTIVE BOX
Patient seen and examined at bedside. No acute events overnight, no new pain    Vitals:  Vital Signs Last 24 Hrs  T(C): 36.3 (03 Jun 2023 00:00), Max: 37.5 (02 Jun 2023 11:42)  T(F): 97.4 (03 Jun 2023 00:00), Max: 99.5 (02 Jun 2023 11:42)  HR: 86 (03 Jun 2023 00:00) (74 - 86)  BP: 111/64 (03 Jun 2023 00:00) (111/64 - 137/63)  BP(mean): --  RR: 18 (03 Jun 2023 00:00) (18 - 18)  SpO2: 93% (03 Jun 2023 00:00) (93% - 98%)    Parameters below as of 03 Jun 2023 00:00  Patient On (Oxygen Delivery Method): room air        Labs:    Exam:  Gen: pt lying in bed, alert, in NAD  Resp: unlabored  CVS: RRR  Abd: soft, NT, ND  Ext: moving all extremities spontaneously, sensation intact, pulses 2+

## 2023-06-03 NOTE — PROGRESS NOTE ADULT - ATTENDING COMMENTS
Agree with above assessment.  The patient was seen and examined by myself with the surgical resident.  The patient is without new events or complaints overnight.  She is trauma stable for discharge planning.

## 2023-06-03 NOTE — PROGRESS NOTE ADULT - ASSESSMENT
Patient is a 89 yo F s/p fall with SDH, L sup/inf rami fx, gluteal hematoma, s/p 2plt and 2u prbc transfusions, hemodynamically stable, afebrile.     Plan:   MRI showing all chronic findings  Palliative following- not ready for hospice   Hyponatremia- salt tabs TID, fluid restriction  DVT ppx   Hem/onc following   Regular diet   Ortho- WBAT   Pain control   F/u Neurosurgery as outpatient  Monitor delirium- maintain sleep/wake cycle   DC planning to [place]  - off 1:1

## 2023-06-04 PROCEDURE — 99231 SBSQ HOSP IP/OBS SF/LOW 25: CPT

## 2023-06-04 RX ADMIN — SODIUM CHLORIDE 1 GRAM(S): 9 INJECTION INTRAMUSCULAR; INTRAVENOUS; SUBCUTANEOUS at 21:48

## 2023-06-04 RX ADMIN — Medication 12.5 MILLIGRAM(S): at 17:17

## 2023-06-04 RX ADMIN — Medication 12.5 MILLIGRAM(S): at 05:55

## 2023-06-04 RX ADMIN — ENOXAPARIN SODIUM 40 MILLIGRAM(S): 100 INJECTION SUBCUTANEOUS at 21:48

## 2023-06-04 RX ADMIN — Medication 5 MILLIGRAM(S): at 21:48

## 2023-06-04 RX ADMIN — ATORVASTATIN CALCIUM 40 MILLIGRAM(S): 80 TABLET, FILM COATED ORAL at 21:48

## 2023-06-04 RX ADMIN — SODIUM CHLORIDE 1 GRAM(S): 9 INJECTION INTRAMUSCULAR; INTRAVENOUS; SUBCUTANEOUS at 05:55

## 2023-06-04 RX ADMIN — SODIUM CHLORIDE 1 GRAM(S): 9 INJECTION INTRAMUSCULAR; INTRAVENOUS; SUBCUTANEOUS at 11:47

## 2023-06-04 RX ADMIN — QUETIAPINE FUMARATE 12.5 MILLIGRAM(S): 200 TABLET, FILM COATED ORAL at 21:48

## 2023-06-04 NOTE — PROGRESS NOTE ADULT - ASSESSMENT
Patient is a 89 yo F s/p fall with SDH, L sup/inf rami fx, gluteal hematoma, s/p 2plt and 2u prbc transfusions, hemodynamically stable, afebrile.     Plan:   MRI showing all chronic findings  Palliative following- not ready for hospice   Hyponatremia- salt tabs TID, fluid restriction  DVT ppx   Regular diet   Ortho- WBAT   Pain control   F/u Neurosurgery as outpatient  Monitor delirium- maintain sleep/wake cycle   DC planning to [facility]  - off 1:1  89 yo F s/p fall with SDH, L sup/inf rami fx, gluteal hematoma, s/p 2plt and 2u prbc transfusions, hemodynamically stable, afebrile.     Plan:   Awaiting placement   MRI showing all chronic findings  Palliative following- not ready for hospice   Hyponatremia- salt tabs TID, fluid restriction  DVT ppx   Regular diet   Ortho- WBAT   Pain control   F/u Neurosurgery as outpatient  Monitor delirium- maintain sleep/wake cycle   DC planning to [facility]  - off 1:1

## 2023-06-04 NOTE — PROGRESS NOTE ADULT - SUBJECTIVE AND OBJECTIVE BOX
HPI/OVERNIGHT EVENTS: Patient seen and examined at bedside this AM. No overnight events. No complaints. Afebrile. VSS    Vital Signs Last 24 Hrs  T(C): 37.3 (03 Jun 2023 21:23), Max: 37.7 (03 Jun 2023 15:45)  T(F): 99.2 (03 Jun 2023 21:23), Max: 99.8 (03 Jun 2023 15:45)  HR: 87 (03 Jun 2023 21:23) (75 - 87)  BP: 106/60 (03 Jun 2023 21:23) (106/60 - 127/67)  BP(mean): --  RR: 18 (03 Jun 2023 21:23) (18 - 18)  SpO2: 97% (03 Jun 2023 21:23) (94% - 97%)    Parameters below as of 03 Jun 2023 21:23  Patient On (Oxygen Delivery Method): room air        I&O's Detail    02 Jun 2023 07:01  -  03 Jun 2023 07:00  --------------------------------------------------------  IN:  Total IN: 0 mL    OUT:    Voided (mL): 1200 mL  Total OUT: 1200 mL    Total NET: -1200 mL      03 Jun 2023 07:01  -  04 Jun 2023 05:02  --------------------------------------------------------  IN:  Total IN: 0 mL    OUT:    Voided (mL): 600 mL  Total OUT: 600 mL    Total NET: -600 mL          Exam:  Gen: pt lying in bed, alert, in NAD  Resp: unlabored  CVS: RRR  Abd: soft, NT, ND  Ext: moving all extremities spontaneously, sensation intact, pulses 2+       LABS:                MEDICATIONS  (STANDING):  atorvastatin 40 milliGRAM(s) Oral at bedtime  enoxaparin Injectable 40 milliGRAM(s) SubCutaneous every 24 hours  lidocaine   4% Patch 1 Patch Transdermal daily  melatonin 5 milliGRAM(s) Oral at bedtime  metoprolol tartrate 12.5 milliGRAM(s) Oral every 12 hours  OLANZapine 2.5 milliGRAM(s) Oral once  QUEtiapine 12.5 milliGRAM(s) Oral at bedtime  sodium chloride 1 Gram(s) Oral every 8 hours    MEDICATIONS  (PRN):  acetaminophen     Tablet .. 975 milliGRAM(s) Oral every 6 hours PRN Temp greater or equal to 38C (100.4F), Mild Pain (1 - 3)      MICRO:   Cultures     STUDIES:   EKG, CXR, U/S, CT, MRI    HPI/OVERNIGHT EVENTS: Patient seen and examined at bedside this AM. No overnight events. No complaints. Slept comfortably.     Vital Signs Last 24 Hrs  T(C): 37.3 (03 Jun 2023 21:23), Max: 37.7 (03 Jun 2023 15:45)  T(F): 99.2 (03 Jun 2023 21:23), Max: 99.8 (03 Jun 2023 15:45)  HR: 87 (03 Jun 2023 21:23) (75 - 87)  BP: 106/60 (03 Jun 2023 21:23) (106/60 - 127/67)  BP(mean): --  RR: 18 (03 Jun 2023 21:23) (18 - 18)  SpO2: 97% (03 Jun 2023 21:23) (94% - 97%)    Parameters below as of 03 Jun 2023 21:23  Patient On (Oxygen Delivery Method): room air        I&O's Detail    02 Jun 2023 07:01  -  03 Jun 2023 07:00  --------------------------------------------------------  IN:  Total IN: 0 mL    OUT:    Voided (mL): 1200 mL  Total OUT: 1200 mL    Total NET: -1200 mL      03 Jun 2023 07:01  -  04 Jun 2023 05:02  --------------------------------------------------------  IN:  Total IN: 0 mL    OUT:    Voided (mL): 600 mL  Total OUT: 600 mL    Total NET: -600 mL          Exam:  Gen: pt lying in bed, alert, in NAD  Resp: unlabored breathing  CVS: RRR  Abd: soft, NT, ND  Ext: moving all extremities spontaneously, sensation intact, pulses 2+   Neuro: AO x1       LABS:                MEDICATIONS  (STANDING):  atorvastatin 40 milliGRAM(s) Oral at bedtime  enoxaparin Injectable 40 milliGRAM(s) SubCutaneous every 24 hours  lidocaine   4% Patch 1 Patch Transdermal daily  melatonin 5 milliGRAM(s) Oral at bedtime  metoprolol tartrate 12.5 milliGRAM(s) Oral every 12 hours  OLANZapine 2.5 milliGRAM(s) Oral once  QUEtiapine 12.5 milliGRAM(s) Oral at bedtime  sodium chloride 1 Gram(s) Oral every 8 hours    MEDICATIONS  (PRN):  acetaminophen     Tablet .. 975 milliGRAM(s) Oral every 6 hours PRN Temp greater or equal to 38C (100.4F), Mild Pain (1 - 3)      MICRO:   Cultures     STUDIES:   EKG, CXR, U/S, CT, MRI

## 2023-06-05 RX ORDER — OLANZAPINE 15 MG/1
1 TABLET, FILM COATED ORAL
Qty: 0 | Refills: 0 | DISCHARGE
Start: 2023-06-05

## 2023-06-05 RX ADMIN — ENOXAPARIN SODIUM 40 MILLIGRAM(S): 100 INJECTION SUBCUTANEOUS at 21:24

## 2023-06-05 RX ADMIN — SODIUM CHLORIDE 1 GRAM(S): 9 INJECTION INTRAMUSCULAR; INTRAVENOUS; SUBCUTANEOUS at 11:13

## 2023-06-05 RX ADMIN — QUETIAPINE FUMARATE 12.5 MILLIGRAM(S): 200 TABLET, FILM COATED ORAL at 21:24

## 2023-06-05 RX ADMIN — SODIUM CHLORIDE 1 GRAM(S): 9 INJECTION INTRAMUSCULAR; INTRAVENOUS; SUBCUTANEOUS at 06:26

## 2023-06-05 RX ADMIN — Medication 5 MILLIGRAM(S): at 21:23

## 2023-06-05 RX ADMIN — Medication 12.5 MILLIGRAM(S): at 18:12

## 2023-06-05 RX ADMIN — ATORVASTATIN CALCIUM 40 MILLIGRAM(S): 80 TABLET, FILM COATED ORAL at 21:23

## 2023-06-05 RX ADMIN — SODIUM CHLORIDE 1 GRAM(S): 9 INJECTION INTRAMUSCULAR; INTRAVENOUS; SUBCUTANEOUS at 21:24

## 2023-06-05 RX ADMIN — Medication 12.5 MILLIGRAM(S): at 06:27

## 2023-06-05 NOTE — PROGRESS NOTE ADULT - ASSESSMENT
89 yo F known patient of Dr Torres at [facility] , h/o MDS on Aranesp last on 5/11 ans supportive transfusions , presenting after a fall in the shower, c/o buttock pain, unknown headstrike/LOC. CT at [place] showed a Left Superior and inferior pubic rami fracture. SDH intrahemishere region on CT head. Repeat CTH stable.    1) MDS  recent flow cytometry on blood showed 13% blasts  ? transformation to leukemia  given age and comorbidities not a candidate for aggressive therapy  c/w supportive transfusions  keep hgb > 7, platelet count > 50k  Patient should keep appointment with Hematology upon discharge (would recommend follow-up clinic appointment with Hematology within 1 week upon discharge)  If patient is discharged to rehab, should perform weekly CBC  Palliative care eval    2) L hip fracture  ortho following  conservative measures  plan for rehab    3) SDH  repeat CTH stable  neurosx following  goal plt > 70-80k, given BM involved this may not be achieved with transfusions   plt count today is pending    4) DVT ppx  lovenox as long as plt count > 30,000    further mx as per primary team

## 2023-06-05 NOTE — PROGRESS NOTE ADULT - SUBJECTIVE AND OBJECTIVE BOX
INTERVAL HPI: No acute events overnight. Pain is controlled. No worsening or new pains. Tolerating diet. Denies F/C/N/V/CP/SOB. Urinating.  Comfortable in bed.     MEDICATIONS  (STANDING):  atorvastatin 40 milliGRAM(s) Oral at bedtime  enoxaparin Injectable 40 milliGRAM(s) SubCutaneous every 24 hours  lidocaine   4% Patch 1 Patch Transdermal daily  melatonin 5 milliGRAM(s) Oral at bedtime  metoprolol tartrate 12.5 milliGRAM(s) Oral every 12 hours  OLANZapine 2.5 milliGRAM(s) Oral once  QUEtiapine 12.5 milliGRAM(s) Oral at bedtime  sodium chloride 1 Gram(s) Oral every 8 hours      MEDICATIONS  (PRN):  acetaminophen     Tablet .. 975 milliGRAM(s) Oral every 6 hours PRN Temp greater or equal to 38C (100.4F), Mild Pain (1 - 3)      Allergies:  Proplene Glycol (Urticaria)  Levaquin (Other)  steroids - numbness to face (Unknown)  COBALT (Urticaria)  Balsam of [place] (Urticaria)      PAST MEDICAL & SURGICAL HISTORY:  Macular degeneration      HTN (hypertension)      HLD (hyperlipidemia)      Osteoarthritis      Anxiety      Anemia      MDS (myelodysplastic syndrome)      No significant past surgical history          FAMILY HISTORY:  FH: CAD (coronary artery disease)    Family history of diabetes mellitus (DM)        Physical Exam:  General: NAD, resting comfortably in bed  Pulmonary: Nonlabored breathing, no respiratory distress  Cardiovascular: NSR  Abdominal: soft,  non tender and nondistended, no rigidity or peritonitic signs   Extremities: WWP, FROM, sensation intact       Vital Signs Last 24 Hrs  T(C): 37.1 (04 Jun 2023 19:10), Max: 37.2 (04 Jun 2023 09:09)  T(F): 98.8 (04 Jun 2023 19:10), Max: 98.9 (04 Jun 2023 09:09)  HR: 83 (04 Jun 2023 19:10) (49 - 83)  BP: 119/60 (04 Jun 2023 19:10) (110/66 - 130/74)  BP(mean): --  RR: 18 (04 Jun 2023 19:10) (18 - 18)  SpO2: 100% (04 Jun 2023 19:10) (95% - 100%)    Parameters below as of 04 Jun 2023 19:10  Patient On (Oxygen Delivery Method): room air        I&O's Summary    03 Jun 2023 07:01  -  04 Jun 2023 07:00  --------------------------------------------------------  IN: 0 mL / OUT: 600 mL / NET: -600 mL    04 Jun 2023 07:01  -  05 Jun 2023 03:27  --------------------------------------------------------  IN: 0 mL / OUT: 300 mL / NET: -300 mL        LABS:

## 2023-06-05 NOTE — PROGRESS NOTE ADULT - SUBJECTIVE AND OBJECTIVE BOX
No acute ON events  DC planning to rehab    Gen - in NAD  Heart - S1S2 RRR  Lung - Dec BS B/L  Abd - soft ND NT  eXT - NO EDEMA      MEDICATIONS  (STANDING):  atorvastatin 40 milliGRAM(s) Oral at bedtime  enoxaparin Injectable 40 milliGRAM(s) SubCutaneous every 24 hours  lidocaine   4% Patch 1 Patch Transdermal daily  melatonin 5 milliGRAM(s) Oral at bedtime  metoprolol tartrate 12.5 milliGRAM(s) Oral every 12 hours  QUEtiapine 12.5 milliGRAM(s) Oral at bedtime  sodium chloride 1 Gram(s) Oral every 8 hours    Vital Signs Last 24 Hrs  T(C): 37.1 (05 Jun 2023 04:05), Max: 37.2 (04 Jun 2023 09:09)  T(F): 98.8 (05 Jun 2023 04:05), Max: 98.9 (04 Jun 2023 09:09)  HR: 75 (05 Jun 2023 04:05) (49 - 83)  BP: 111/62 (05 Jun 2023 04:05) (111/62 - 130/74)  BP(mean): --  RR: 18 (05 Jun 2023 04:05) (18 - 18)  SpO2: 99% (05 Jun 2023 04:05) (95% - 100%)  Parameters below as of 05 Jun 2023 04:05  Patient On (Oxygen Delivery Method): room air

## 2023-06-06 LAB
BLD GP AB SCN SERPL QL: SIGNIFICANT CHANGE UP
HCT VFR BLD CALC: 17.2 % — CRITICAL LOW (ref 34.5–45)
HCT VFR BLD CALC: 17.9 % — CRITICAL LOW (ref 34.5–45)
HGB BLD-MCNC: 5.7 G/DL — CRITICAL LOW (ref 11.5–15.5)
HGB BLD-MCNC: 5.8 G/DL — CRITICAL LOW (ref 11.5–15.5)
MCHC RBC-ENTMCNC: 27.9 PG — SIGNIFICANT CHANGE UP (ref 27–34)
MCHC RBC-ENTMCNC: 33.1 GM/DL — SIGNIFICANT CHANGE UP (ref 32–36)
MCV RBC AUTO: 84.3 FL — SIGNIFICANT CHANGE UP (ref 80–100)
NRBC # BLD: 2 /100 WBCS — HIGH (ref 0–0)
PLATELET # BLD AUTO: 55 K/UL — LOW (ref 150–400)
RBC # BLD: 2.04 M/UL — LOW (ref 3.8–5.2)
RBC # FLD: 17 % — HIGH (ref 10.3–14.5)
WBC # BLD: 35.63 K/UL — HIGH (ref 3.8–10.5)
WBC # FLD AUTO: 35.63 K/UL — HIGH (ref 3.8–10.5)

## 2023-06-06 PROCEDURE — 99231 SBSQ HOSP IP/OBS SF/LOW 25: CPT

## 2023-06-06 RX ADMIN — Medication 12.5 MILLIGRAM(S): at 05:11

## 2023-06-06 RX ADMIN — SODIUM CHLORIDE 1 GRAM(S): 9 INJECTION INTRAMUSCULAR; INTRAVENOUS; SUBCUTANEOUS at 05:11

## 2023-06-06 RX ADMIN — SODIUM CHLORIDE 1 GRAM(S): 9 INJECTION INTRAMUSCULAR; INTRAVENOUS; SUBCUTANEOUS at 17:28

## 2023-06-06 RX ADMIN — SODIUM CHLORIDE 1 GRAM(S): 9 INJECTION INTRAMUSCULAR; INTRAVENOUS; SUBCUTANEOUS at 21:16

## 2023-06-06 RX ADMIN — Medication 12.5 MILLIGRAM(S): at 17:28

## 2023-06-06 RX ADMIN — Medication 5 MILLIGRAM(S): at 21:16

## 2023-06-06 RX ADMIN — QUETIAPINE FUMARATE 12.5 MILLIGRAM(S): 200 TABLET, FILM COATED ORAL at 21:16

## 2023-06-06 RX ADMIN — ATORVASTATIN CALCIUM 40 MILLIGRAM(S): 80 TABLET, FILM COATED ORAL at 21:16

## 2023-06-06 NOTE — PROGRESS NOTE ADULT - ASSESSMENT
89yF w/ PMHx of HTN, HLD, leukemia (remission), thrombocytopenia s/p fall. Injuries are identified: SDH, L sup/inf rami fx, gluteal hematoma, SIADH.     PLAN:    - awaiting placement to [facility], stable for discharge from a trauma perspective, conditional d/c in    - ortho: WBAT LLE   - PMR: [facility]   - F/u Neurosurgery as outpatient  - keep Hb >7, plt >50 per heme onc  - monitor vitals   - Lov dvt ppx  - continue home meds   - diet: fluid restriction 1000 mL, regular   - seroquel for agitation

## 2023-06-06 NOTE — PROGRESS NOTE ADULT - SUBJECTIVE AND OBJECTIVE BOX
SUBJECTIVE/24 hour events:  Patient is a 89yFemale s/p fall sustaining a sdh, left inferior/superior rami fx, gluteal hematoma and SIADH ( resolving) Patient with no acute events overnight, VSS, no signs and or symptoms of acute blood loss anemia, calm and cooperative. Patient  neurologically  stable and neurosurgery signed off. Heme-onc following, appreciate recommendations, needs to f/u with her outpatient hematologist within 1 week of discharge. Patient denied acute rehab, awaiting [facility] placement      Vital Signs Last 24 Hrs  T(C): 37.3 (05 Jun 2023 19:44), Max: 37.3 (05 Jun 2023 19:44)  T(F): 99.2 (05 Jun 2023 19:44), Max: 99.2 (05 Jun 2023 19:44)  HR: 78 (05 Jun 2023 19:44) (71 - 78)  BP: 116/56 (05 Jun 2023 19:44) (111/62 - 135/65)  BP(mean): --  RR: 18 (05 Jun 2023 19:44) (18 - 18)  SpO2: 98% (05 Jun 2023 19:44) (94% - 99%)    Parameters below as of 05 Jun 2023 19:44  Patient On (Oxygen Delivery Method): room air      Drug Dosing Weight  Height (cm): 154.9 (23 May 2023 16:36)  Weight (kg): 55.8 (23 May 2023 16:36)  BMI (kg/m2): 23.3 (23 May 2023 16:36)  BSA (m2): 1.54 (23 May 2023 16:36)  I&O's Detail    04 Jun 2023 07:01  -  05 Jun 2023 07:00  --------------------------------------------------------  IN:  Total IN: 0 mL    OUT:    Voided (mL): 300 mL  Total OUT: 300 mL    Total NET: -300 mL        Allergies    Proplene Glycol (Urticaria)  steroids - numbness to face (Unknown)  COBALT (Urticaria)  Balsam of [place] (Urticaria)    Intolerances    Levaquin (Other)              ROS:      Physical Exam:  General: NAD," I am okay I know I am safe here"  Pulmonary: Nonlabored breathing, no respiratory distress  Cardiovascular: NSR  Abdominal: soft,  non tender and nondistended, no rigidity or peritonitic signs   Extremities: WWP, FROM, sensation intact             MEDICATIONS  (STANDING):  atorvastatin 40 milliGRAM(s) Oral at bedtime  enoxaparin Injectable 40 milliGRAM(s) SubCutaneous every 24 hours  lidocaine   4% Patch 1 Patch Transdermal daily  melatonin 5 milliGRAM(s) Oral at bedtime  metoprolol tartrate 12.5 milliGRAM(s) Oral every 12 hours  OLANZapine 2.5 milliGRAM(s) Oral once  QUEtiapine 12.5 milliGRAM(s) Oral at bedtime  sodium chloride 1 Gram(s) Oral every 8 hours    MEDICATIONS  (PRN):  acetaminophen     Tablet .. 975 milliGRAM(s) Oral every 6 hours PRN Temp greater or equal to 38C (100.4F), Mild Pain (1 - 3)      RADIOLOGY STUDIES:    CULTURES:

## 2023-06-06 NOTE — PROGRESS NOTE ADULT - ATTENDING COMMENTS
Patient seen and examined on AM rounds  Doing well  No new complaints  Vitals stable  Hyponatremia stable (Na+= 128 -> 128 -> 131 -> 128 -> 128)    - Awaiting sub acute rehab placement

## 2023-06-07 LAB
ANION GAP SERPL CALC-SCNC: 8 MMOL/L — SIGNIFICANT CHANGE UP (ref 5–17)
ANISOCYTOSIS BLD QL: SLIGHT — SIGNIFICANT CHANGE UP
BASOPHILS # BLD AUTO: 0 K/UL — SIGNIFICANT CHANGE UP (ref 0–0.2)
BASOPHILS NFR BLD AUTO: 0 % — SIGNIFICANT CHANGE UP (ref 0–2)
BLASTS # FLD: 0.9 % — HIGH (ref 0–0)
BUN SERPL-MCNC: 13.6 MG/DL — SIGNIFICANT CHANGE UP (ref 8–20)
CALCIUM SERPL-MCNC: 9.3 MG/DL — SIGNIFICANT CHANGE UP (ref 8.4–10.5)
CHLORIDE SERPL-SCNC: 98 MMOL/L — SIGNIFICANT CHANGE UP (ref 96–108)
CO2 SERPL-SCNC: 23 MMOL/L — SIGNIFICANT CHANGE UP (ref 22–29)
CREAT SERPL-MCNC: 0.57 MG/DL — SIGNIFICANT CHANGE UP (ref 0.5–1.3)
EGFR: 86 ML/MIN/1.73M2 — SIGNIFICANT CHANGE UP
EOSINOPHIL # BLD AUTO: 0.31 K/UL — SIGNIFICANT CHANGE UP (ref 0–0.5)
EOSINOPHIL NFR BLD AUTO: 0.9 % — SIGNIFICANT CHANGE UP (ref 0–6)
GLUCOSE SERPL-MCNC: 105 MG/DL — HIGH (ref 70–99)
HCT VFR BLD CALC: 21 % — CRITICAL LOW (ref 34.5–45)
HCT VFR BLD CALC: 26.9 % — LOW (ref 34.5–45)
HGB BLD-MCNC: 7 G/DL — CRITICAL LOW (ref 11.5–15.5)
HGB BLD-MCNC: 9 G/DL — LOW (ref 11.5–15.5)
HYPOCHROMIA BLD QL: SLIGHT — SIGNIFICANT CHANGE UP
LYMPHOCYTES # BLD AUTO: 13.2 % — SIGNIFICANT CHANGE UP (ref 13–44)
LYMPHOCYTES # BLD AUTO: 4.58 K/UL — HIGH (ref 1–3.3)
MACROCYTES BLD QL: SLIGHT — SIGNIFICANT CHANGE UP
MAGNESIUM SERPL-MCNC: 2 MG/DL — SIGNIFICANT CHANGE UP (ref 1.6–2.6)
MANUAL SMEAR VERIFICATION: SIGNIFICANT CHANGE UP
MCHC RBC-ENTMCNC: 27.7 PG — SIGNIFICANT CHANGE UP (ref 27–34)
MCHC RBC-ENTMCNC: 33.3 GM/DL — SIGNIFICANT CHANGE UP (ref 32–36)
MCV RBC AUTO: 83 FL — SIGNIFICANT CHANGE UP (ref 80–100)
METAMYELOCYTES # FLD: 1.7 % — HIGH (ref 0–0)
MICROCYTES BLD QL: SLIGHT — SIGNIFICANT CHANGE UP
MONOCYTES # BLD AUTO: 0.59 K/UL — SIGNIFICANT CHANGE UP (ref 0–0.9)
MONOCYTES NFR BLD AUTO: 1.7 % — LOW (ref 2–14)
MYELOCYTES NFR BLD: 13.2 % — HIGH (ref 0–0)
NEUTROPHILS # BLD AUTO: 23.72 K/UL — HIGH (ref 1.8–7.4)
NEUTROPHILS NFR BLD AUTO: 61.4 % — SIGNIFICANT CHANGE UP (ref 43–77)
NEUTS BAND # BLD: 7 % — SIGNIFICANT CHANGE UP (ref 0–8)
NRBC # BLD: 4 /100 — HIGH (ref 0–0)
OVALOCYTES BLD QL SMEAR: SLIGHT — SIGNIFICANT CHANGE UP
PHOSPHATE SERPL-MCNC: 3.4 MG/DL — SIGNIFICANT CHANGE UP (ref 2.4–4.7)
PLAT MORPH BLD: NORMAL — SIGNIFICANT CHANGE UP
PLATELET # BLD AUTO: 45 K/UL — LOW (ref 150–400)
POIKILOCYTOSIS BLD QL AUTO: SLIGHT — SIGNIFICANT CHANGE UP
POLYCHROMASIA BLD QL SMEAR: SLIGHT — SIGNIFICANT CHANGE UP
POTASSIUM SERPL-MCNC: 3.7 MMOL/L — SIGNIFICANT CHANGE UP (ref 3.5–5.3)
POTASSIUM SERPL-SCNC: 3.7 MMOL/L — SIGNIFICANT CHANGE UP (ref 3.5–5.3)
RBC # BLD: 2.53 M/UL — LOW (ref 3.8–5.2)
RBC # FLD: 16.3 % — HIGH (ref 10.3–14.5)
RBC BLD AUTO: ABNORMAL
SARS-COV-2 RNA SPEC QL NAA+PROBE: SIGNIFICANT CHANGE UP
SCHISTOCYTES BLD QL AUTO: SLIGHT — SIGNIFICANT CHANGE UP
SODIUM SERPL-SCNC: 129 MMOL/L — LOW (ref 135–145)
WBC # BLD: 34.68 K/UL — HIGH (ref 3.8–10.5)
WBC # FLD AUTO: 34.68 K/UL — HIGH (ref 3.8–10.5)

## 2023-06-07 PROCEDURE — 99232 SBSQ HOSP IP/OBS MODERATE 35: CPT

## 2023-06-07 RX ADMIN — SODIUM CHLORIDE 1 GRAM(S): 9 INJECTION INTRAMUSCULAR; INTRAVENOUS; SUBCUTANEOUS at 22:02

## 2023-06-07 RX ADMIN — SODIUM CHLORIDE 1 GRAM(S): 9 INJECTION INTRAMUSCULAR; INTRAVENOUS; SUBCUTANEOUS at 14:42

## 2023-06-07 RX ADMIN — QUETIAPINE FUMARATE 12.5 MILLIGRAM(S): 200 TABLET, FILM COATED ORAL at 22:03

## 2023-06-07 RX ADMIN — SODIUM CHLORIDE 1 GRAM(S): 9 INJECTION INTRAMUSCULAR; INTRAVENOUS; SUBCUTANEOUS at 05:16

## 2023-06-07 RX ADMIN — ATORVASTATIN CALCIUM 40 MILLIGRAM(S): 80 TABLET, FILM COATED ORAL at 22:02

## 2023-06-07 RX ADMIN — Medication 12.5 MILLIGRAM(S): at 17:46

## 2023-06-07 RX ADMIN — Medication 5 MILLIGRAM(S): at 22:02

## 2023-06-07 RX ADMIN — Medication 12.5 MILLIGRAM(S): at 05:16

## 2023-06-07 RX ADMIN — ENOXAPARIN SODIUM 40 MILLIGRAM(S): 100 INJECTION SUBCUTANEOUS at 22:03

## 2023-06-07 NOTE — PROVIDER CONTACT NOTE (CRITICAL VALUE NOTIFICATION) - ASSESSMENT
pt denies any dizziness or lightheadedness. denies any chest pain or SOB.
patient alert and responsive, family at bedside. no distress vitals stable
pt is asymptomatic

## 2023-06-07 NOTE — CHART NOTE - NSCHARTNOTEFT_GEN_A_CORE
FLOOR ACCEPTANCE NOTE  -----------------------------  ICU Admission Date: 5/23  Transfer Date: 05-25-23 @ 14:36    Admission Diagnosis: [id number]    Active Problems/injuries: Traumatic SDH, L Superior/Inferior pubic rami fx/ Gluteal Hematoma    Procedures: None    Consultants:  [ ] Cardiology  [ ] Endocrine  [ ] Infectious Disease  [ ] Medicine  [x ]Neurosurgery  [x ] Ortho       [x] Weight Bearing Status: WBAT  [ ] Palliative       [ ] Advanced Directives:    [ ] Physical Medicine and Rehab       [ ] Disposition :   [ ] Plastics  [ ] Pulmonary    Medications  acetaminophen     Tablet .. 975 milliGRAM(s) Oral every 6 hours PRN  amitriptyline 25 milliGRAM(s) Oral every 12 hours  atorvastatin 40 milliGRAM(s) Oral at bedtime  chlorhexidine 2% Cloths 1 Application(s) Topical daily  enoxaparin Injectable 40 milliGRAM(s) SubCutaneous every 24 hours  lidocaine   4% Patch 1 Patch Transdermal daily  melatonin 5 milliGRAM(s) Oral at bedtime  metoprolol tartrate 12.5 milliGRAM(s) Oral every 12 hours      [ x] I attest I have reviewed and reconciled all medications prior to transfer    IV Fluids  IVL    Antibiotics: None    I have discussed this case with OCTAVIO Arciniega upon transfer and all questions regarding ICU course were answered.  The following items are to be followed up:    1. WBAT  2. PT/OT - Acute Rehab candidate  3. Pain is controlled  4. f/u Neurosurgery as outpatient  5. CBC in AM  6. Monitor delirium- maintain sleep/wake cycle, avoid deliriogenic medications\  7. d/c planning
Palliative care social work note.    SW provided palliative care resource list to daughter. SW also provided contact information at her request for outpatient palliative care programs and Hospice for later if family decides up on services.
SICU TRANSFER NOTE  -----------------------------  ICU Admission Date: 5/23/2023  Transfer Date: 05-25-23 @ 12:00    Admission Diagnosis:   1. SDH, interhemisphere  2. L superior/inferior pubic rami fx  3. R gluteal hematoma    Active Problems/injuries:   1. L sup/inferior pubic rami fx  2. chronic thrombocytopenia  3. SDH, R gluteal hematoma    Procedures: none    Consultants:  [x] Heme/Onc  [x] Neurosurgery  [x] Ortho       [x] Weight Bearing Status: WBAT  [x] Physical Medicine and Rehab       [x] Disposition : Acute rehab     Medications  acetaminophen     Tablet .. 975 milliGRAM(s) Oral every 6 hours PRN  amitriptyline 25 milliGRAM(s) Oral every 12 hours  atorvastatin 40 milliGRAM(s) Oral at bedtime  chlorhexidine 2% Cloths 1 Application(s) Topical daily  enoxaparin Injectable 40 milliGRAM(s) SubCutaneous every 24 hours  lidocaine   4% Patch 1 Patch Transdermal daily  melatonin 5 milliGRAM(s) Oral at bedtime  metoprolol tartrate 12.5 milliGRAM(s) Oral every 12 hours      [x] I attest I have reviewed and reconciled all medications prior to transfer    IV Fluids  sodium chloride 0.9%.: Solution, 1000 milliLiter(s) infuse at 40 mL/Hr  Provider's Contact #: [phone]    Indication: XXXXXX    Antibiotics: none      I have discussed this case with trauma team upon transfer and all questions regarding ICU course were answered.  The following items are to be followed up:
Source: Patient [ x]  Family [ ]   other [x ]    Current Diet: Diet, Regular:   1000mL Fluid Restriction ([id number]) (05-29-23 @ 19:41)      Patient reports [ ] nausea  [ ] vomiting [ ] diarrhea [ ] constipation  [ ]chewing problems [ ] swallowing issues  [ ] other:     PO intake:  < 50% [ ]   50-75%  [ ]   %  [x ]  other :    Current Weight:   (5/30) 137.5 lbs  (5/24) 173.9 lbs  (5/23) 174.3 lbs  (5/23) 123 lbs  ? accuracy of weights, no edema documented, continue to trend    Pertinent Medications: MEDICATIONS  (STANDING):  atorvastatin 40 milliGRAM(s) Oral at bedtime  enoxaparin Injectable 40 milliGRAM(s) SubCutaneous every 24 hours  lidocaine   4% Patch 1 Patch Transdermal daily  melatonin 5 milliGRAM(s) Oral at bedtime  metoprolol tartrate 12.5 milliGRAM(s) Oral every 12 hours  QUEtiapine 12.5 milliGRAM(s) Oral at bedtime  sodium chloride 1 Gram(s) Oral every 8 hours    MEDICATIONS  (PRN):  acetaminophen     Tablet .. 975 milliGRAM(s) Oral every 6 hours PRN Temp greater or equal to 38C (100.4F), Mild Pain (1 - 3)    Pertinent Labs: CBC Full  -  ( 31 May 2023 04:30 )  WBC Count : 32.21 K/uL  RBC Count : 2.75 M/uL  Hemoglobin : 7.6 g/dL  Hematocrit : 22.9 %  Platelet Count - Automated : 46 K/uL  Mean Cell Volume : 83.3 fl  Mean Cell Hemoglobin : 27.6 pg  Mean Cell Hemoglobin Concentration : 33.2 gm/dL  Auto Neutrophil # : 16.52 K/uL  Auto Lymphocyte # : 6.73 K/uL  Auto Monocyte # : 0.55 K/uL  Auto Eosinophil # : 1.42 K/uL  Auto Basophil # : 0.00 K/uL  Auto Neutrophil % : 47.0 %  Auto Lymphocyte % : 20.9 %  Auto Monocyte % : 1.7 %  Auto Eosinophil % : 4.4 %  Auto Basophil % : 0.0 %    05-31 Na128 mmol/L<L> Glu 97 mg/dL K+ 4.2 mmol/L Cr  0.72 mg/dL BUN 15.1 mg/dL Phos 3.2 mg/dL Alb n/a   PAB n/a       Skin: Suspected DTI to b/l heels per documentation  Adrián: 18    Nutrition focused physical exam deferred at this time - found signs of malnutrition [ ]absent [ x]present    Subcutaneous fat loss: [ ] Orbital fat pads region, [ ]Buccal fat region, [ ]Triceps region,  [ ]Ribs region    Muscle wasting: [ ]Temples region, [ ]Clavicle region, [ ]Shoulder region, [ ]Scapula region, [ ]Interosseous region,  [ ]thigh region, [ ]Calf region    Estimated Needs:   [x ] no change since previous assessment  [ ] recalculated:     Current Nutrition Diagnosis: Pt remains at nutrition risk secondary to increased nutrient needs related to increased physiological demand for nutrient as evidenced by L superior + inferior public rami fx, SDH, R gluteal hematoma. Pt confused at times. Pt currently with good appetite/po intake. RD to follow up.     Recommendations:   1) Continue diet as tolerated; fluid restriction per MD discretion.   2) Rx: MVI daily.  3) Encourage po intake, monitor diet tolerance, and provide assistance at meals as needed.  4) Obtain daily weights to monitor trends.     Monitoring and Evaluation:   [ x] PO intake [ x] Tolerance to diet prescription [X] Weights  [X] Follow up per protocol [X] Labs: chem 8, mg, phos, H/H
Source: Patient [x ]  Family [ ]   other [ x]    Current Diet: Diet, Regular:   1000mL Fluid Restriction ([id number]) (05-29-23 @ 19:41)      Patient reports [ ] nausea  [ ] vomiting [ ] diarrhea [ ] constipation  [ ]chewing problems [ ] swallowing issues  [ ] other:     PO intake:  < 50% [ ]   50-75%  [ x]   %  [ ]  other :    Source for PO intake [ x] Patient [ ] family [x ] chart [ ] staff [ x] other: visual observation    Current Weight:   (6/7) 123.8 lbs  (5/30) 137.5 lbs  (5/24) 173.9 lbs  (5/23) 174.3 lbs  (5/23) 123 lbs  ? accuracy of weights, noted with 2+ left leg edema, continue to trend and maintain strict Is&Os     Pertinent Medications: MEDICATIONS  (STANDING):  atorvastatin 40 milliGRAM(s) Oral at bedtime  enoxaparin Injectable 40 milliGRAM(s) SubCutaneous every 24 hours  lidocaine   4% Patch 1 Patch Transdermal daily  melatonin 5 milliGRAM(s) Oral at bedtime  metoprolol tartrate 12.5 milliGRAM(s) Oral every 12 hours  OLANZapine 2.5 milliGRAM(s) Oral once  QUEtiapine 12.5 milliGRAM(s) Oral at bedtime  sodium chloride 1 Gram(s) Oral every 8 hours    MEDICATIONS  (PRN):  acetaminophen     Tablet .. 975 milliGRAM(s) Oral every 6 hours PRN Temp greater or equal to 38C (100.4F), Mild Pain (1 - 3)    Pertinent Labs: CBC Full  -  ( 07 Jun 2023 07:10 )  WBC Count : 34.68 K/uL  RBC Count : 2.53 M/uL  Hemoglobin : 7.0 g/dL  Hematocrit : 21.0 %  Platelet Count - Automated : 45 K/uL  Mean Cell Volume : 83.0 fl  Mean Cell Hemoglobin : 27.7 pg  Mean Cell Hemoglobin Concentration : 33.3 gm/dL  Auto Neutrophil # : x  Auto Lymphocyte # : x  Auto Monocyte # : x  Auto Eosinophil # : x  Auto Basophil # : x  Auto Neutrophil % : x  Auto Lymphocyte % : x  Auto Monocyte % : x  Auto Eosinophil % : x  Auto Basophil % : x    06-07 Na129 mmol/L<L> Glu 105 mg/dL<H> K+ 3.7 mmol/L Cr  0.57 mg/dL BUN 13.6 mg/dL Phos 3.4 mg/dL Alb n/a   PAB n/a       Skin: Wound to left heel per documentation  Adrián: 17    Nutrition focused physical exam conducted - found signs of malnutrition [ ]absent [ ]present    Subcutaneous fat loss: [ x] Orbital fat pads region, [ x]Buccal fat region, [ ]Triceps region,  [x ]Ribs region    Muscle wasting: [x ]Temples region, [ x]Clavicle region, [x ]Shoulder region, [ ]Scapula region, [ ]Interosseous region,  [ ]thigh region, [ ]Calf region    Estimated Needs:   [x ] no change since previous assessment  [ ] recalculated:     Current Nutrition Diagnosis: Pt remains at high nutrition risk secondary to malnutrition (severe, chronic) related to inability to meet sufficient protein-energy in setting of advanced age and multiple comorbidities as evidenced by moderate muscle loss of temples, severe muscle loss of clavicles and shoulders, severe fat loss of orbitals, buccal pads, rib region, and 14% wt loss x ~3 months. Pt alert and oriented at time of interview. Reports fair appetite; states po intake varies from fair to good. Pt does report a 20 lb wt loss prior to admission (~3 months). States she drinks Ensure at home. RD to follow up.     Recommendations:   1) Continue diet as tolerated; fluid restriction per MD discretion.  2) Add Ensure Enlive BID (350 kcal, 20g protein per serving).  3) Rx: MVI daily.  4) Encourage po intake, monitor diet tolerance, and provide assistance at meals as needed.  5) Obtain daily weights to monitor trends.     Monitoring and Evaluation:   [ x] PO intake [x ] Tolerance to diet prescription [X] Weights  [X] Follow up per protocol [X] Labs: chem 8, mg, phos, H/H
Spoke to . Rehab is requesting how often patient needs transfusions.   Spoke to oncologist Dr. Sumner at [facility], who stated patient will need labs once a week. Based on the results of the labs, please follow up with the patient's oncologist to determine if transfusion is indicated.
Tertiary Trauma Survey (TTS)    Date of TTS: 05-24-23 @ 15:00                             Admit Date: 05-23-23 @ 18:35      Trauma Activation:    List Injuries Identified to Date:  1. SDH @ interhemispheric region  2. Left Superior and inferior pubic rami fracture  3. Right gluteal hematoma    List Operative and Interventional Radiological Procedures:       Physical Exam:  NEURO: alert, mentating appropriately, no facial asymmetry. gross sensation, motor, coordination are intact    HEENT: NC/AT, PERRL, EOMI, anicteric sclerae, no conjunctival pallor, oral mucus membranes moist, trachea midline, no JVD, neck is supple    Pulm/Chest: lungs CTA with equal breath sounds bilaterally, chest wall nontender and atraumatic    Cardiac: heart with reg rhythm S1, S2, no murmur; distal pulses intact and symmetric bilaterally    GI / Abdomen: normoactive bowel sounds, soft, nondistended, nontender, no palpable masses    Musculoskeletal / Extremities: extremities atraumatic and nontender, no edema, no asymmetry. +L inguinal ttp, +bruise ttp on R back hip. the neck has no midline tenderness, deformity, or stepoff, and is ranged painlessly.    Integumentary: warm, dry, no rash, no cyanosis.      RADIOLOGICAL FINDINGS REVIEW:  Head CT:  05/23/2023  @12:41  Increased parenchymal volume loss and chronic microvascular ischemic   changes are identified when compared with the prior exam.  Extra-axial high attenuation is identified along the interhemispheric   region. This is likely compatible with a small acute subdural hematoma.   This finding measures approximately 0.5 cm widest diameter..  Evaluation of the osseous structures with the appropriate window appears   unremarkable  The visualized paranasal sinuses mastoid and middle ear regions appear   clear.  Small extra-axial sebaceous cyst is seen involving the high left parietal   region.  IMPRESSION: Small acute subdural hematoma suspected as described above.    CT Lumbar spine: 05/23/2023  @12:41  Demineralization of the bones is seen.  Scoliosis convex to the left is identified  Disc space narrowing endplate sclerotic changes and osteophytes are seen   as well as vacuum disc changes. These findings are compatible with   degenerative changes most prominent at the L2-3 level.  No acute fractures or dislocations are seen.  T11-12: Hypertrophic changes involving both facet joints. Mild to   moderate narrowing of the right neural foramen is seen.  T12-L1: Normal  L1-2: Disc bulge and bilateral hypertrophic facet joint changes. Mild   narrowing of the spinal canal.  L2-3: Disc osteophyte complex is seen with bilateral hypertrophic facet   joint changes. Moderate narrowing of the spinal canal. Moderate to severe   narrowing of the left neural foramen.  L3-4: Disc bulge and bilateral hypertrophic facet joint changes. Mild to   moderate narrowing of the spinal canal. Moderate narrowing of both neural   foramen  L4-5: Disc bulge and bilateral hypertrophic facet joint. Moderate to   severe narrowing of the spinal canal. Mild narrowing of both neural   foramen.  L5-S1: Disc bulge and bilateral hypertrophic facet joint changes.   Moderate narrowing of the left neural foramen  Evaluation of the paraspinal soft tissues is limited by lack of IV   contrast though grossly normal  Dilated bladder is identified. Please correlate clinically.  IMPRESSION: Degenerative changes as described above.    CT pelvis: 05/23/2023  @12:41  : No additional findings.  Lower lumbar spine: Severe facet arthrosis and multilevel lumbar   spondylosis, partially imaged/evaluated.  Bones: There is osteopenia. There is an acute oblique fracture lucency   involving the left inferior pubic ramus with stellate fracture lines   extending into the left ischium. There is minimal step-off at the site of   fracture. There is a suspected nondisplaced fracture of the left superior   pubic ramus near the pubic symphysis.Both hips demonstrate periarticular   osteophytes and mild joint space narrowing. There is moderate osteitis   pubis.  Soft tissues: No large localized hematoma. There is edema and enlargement   of the adductor compartment musculature adjacent to the fracture. There   is extensive vascular atherosclerosis.  IMPRESSION:  1.  Acute fracture of the left inferior pubic ramus with stellate   fracture lines extending into the ischium. Suspect nondisplaced fracture   of the left superior pubic ramus, although evaluation is limited by   osteopenia.  2.  Moderate degenerative arthritis of the hips. Multilevel lower lumbar   spondylosis.  3.  Extensive vascular atherosclerosis.    CT head and C-spine: 05/23/2023 @21:50  HEAD CT FINDINGS:  HEMORRHAGE:  Thin parafalcine subdural hematoma appears stable measuring   up to 3 mm in thickness. No new areas of intracranial hemorrhage.  BRAIN PARENCHYMA:  Mild to moderate atrophy. Mild periventricular white   matter ischemic changes  No parenchymal mass or mass effect.  VENTRICLES / SHIFT:  No hydrocephalus. No midline shift.  BASAL CISTERNS:  Basal cisterns preserved.  Atherosclerotic   calcifications of the cavernous internal carotid arteries.  CALVARIUM AND EXTRACRANIAL SOFT TISSUES:  No depressed calvarial fracture.  SINUSES, ORBITS, MASTOIDS:  The visualized paranasal sinuses and mastoid   air cells are well aerated.  CERVICAL SPINE FINDINGS:  FRACTURES:  No evidence of an acute cervical spine  fracture.  ALIGNMENT:  Mild straightening of the normal cervical lordosis. No subluxation.  SPINAL COLUMN:  Vertebral body heights are well-maintained. Moderate   multilevel degenerative disc disease with narrowing of the disc spaces   and endplate degenerative changes/osteophytes.  OTHER: No prevertebral soft tissue swelling.  IMPRESSION:  HEAD CT:  Thin parafalcine subdural hematoma is stable in size and   appearance.  CERVICAL SPINE CT:  No evidence of an acute cervical spine fracture.    CT chest/abd/pelvis with IV con: 05/23/2023 @21:51  CHEST:  LUNGS AND LARGE AIRWAYS: Patent central airways. A 3 mm left upper lobe   nodule (3:35) and a 4 mm peripheral right upper lobe nodule (3:43). Mild   interlobular septal thickening with patchy areas of ground glass opacities.  PLEURA: No pleural effusion.  VESSELS: Atherosclerotic changes of the aorta and coronary arteries.  HEART: Heart size is normal. No pericardial effusion.  MEDIASTINUM AND KENNY: No lymphadenopathy.  CHEST WALL AND LOWER NECK: Within normal limits.  ABDOMEN AND PELVIS:  LIVER: Left hepatic lobe cyst. Additional subcentimeter scattered   indeterminant hypodense foci, too small to characterize..  BILE DUCTS: Normal caliber.  GALLBLADDER: Within normal limits.  SPLEEN: Prominent spleen.  PANCREAS: Within normal limits.  ADRENALS: Within normal limits.  KIDNEYS/URETERS: Within normal limits.  BLADDER: Marked urinary bladder distention.  REPRODUCTIVE ORGANS: Uterus and adnexa within normal limits.  BOWEL: No bowel obstruction.  PERITONEUM: No ascites.  VESSELS: Atherosclerotic changes.  RETROPERITONEUM/LYMPH NODES: No lymphadenopathy.  ABDOMINAL WALL: Right gluteal soft tissue contusion with asymmetric   enlargement of the right gluteus emy muscle, likely due to   intramuscular hematoma. Tiny fat-containing umbilical hernia.  BONES: Degenerative changes of the spine with S-shaped scoliosis.  IMPRESSION:  Right gluteal soft tissue contusion with probable small intramuscular   right gluteus emy hematoma.  No evidence for acute thoracic or abdominal visceral injury.      INCIDENTAL FINDINGS:    [x] No    [ ] Yes, Findings are:      [x] Tertiary exam complete, there are no new injuries identified    [ ] Tertiary exam done, new injuries identified are:                [ ] Imaging ordered:

## 2023-06-07 NOTE — PROGRESS NOTE ADULT - SUBJECTIVE AND OBJECTIVE BOX
INTERVAL HPI: No acute events overnight. Pain is controlled. No worsening or new pains. Tolerating diet. Denies F/C/N/V/CP/SOB. Urinating.      MEDICATIONS  (STANDING):  atorvastatin 40 milliGRAM(s) Oral at bedtime  enoxaparin Injectable 40 milliGRAM(s) SubCutaneous every 24 hours  lidocaine   4% Patch 1 Patch Transdermal daily  melatonin 5 milliGRAM(s) Oral at bedtime  metoprolol tartrate 12.5 milliGRAM(s) Oral every 12 hours  OLANZapine 2.5 milliGRAM(s) Oral once  QUEtiapine 12.5 milliGRAM(s) Oral at bedtime  sodium chloride 1 Gram(s) Oral every 8 hours      MEDICATIONS  (PRN):  acetaminophen     Tablet .. 975 milliGRAM(s) Oral every 6 hours PRN Temp greater or equal to 38C (100.4F), Mild Pain (1 - 3)      Allergies:  Proplene Glycol (Urticaria)  Levaquin (Other)  steroids - numbness to face (Unknown)  COBALT (Urticaria)  Balsam of [place] (Urticaria)      PAST MEDICAL & SURGICAL HISTORY:  Macular degeneration      HTN (hypertension)      HLD (hyperlipidemia)      Osteoarthritis      Anxiety      Anemia      MDS (myelodysplastic syndrome)      No significant past surgical history          FAMILY HISTORY:  FH: CAD (coronary artery disease)    Family history of diabetes mellitus (DM)        Physical Exam:  General: NAD, resting comfortably in bed  Pulmonary: Nonlabored breathing, no respiratory distress  Cardiovascular: NSR  Abdominal: soft,  non tender and nondistended, no rigidity or peritonitic signs   Extremities: WWP, FROM, motor and sensation intact       Vital Signs Last 24 Hrs  T(C): 36.9 (07 Jun 2023 01:36), Max: 37.2 (06 Jun 2023 11:41)  T(F): 98.5 (07 Jun 2023 01:36), Max: 99 (06 Jun 2023 11:41)  HR: 75 (07 Jun 2023 01:36) (71 - 78)  BP: 117/65 (07 Jun 2023 01:36) (110/64 - 149/70)  BP(mean): --  RR: 18 (07 Jun 2023 01:36) (16 - 18)  SpO2: 94% (07 Jun 2023 01:36) (94% - 99%)    Parameters below as of 07 Jun 2023 01:36  Patient On (Oxygen Delivery Method): room air        I&O's Summary    05 Jun 2023 07:01  -  06 Jun 2023 07:00  --------------------------------------------------------  IN: 0 mL / OUT: 300 mL / NET: -300 mL        LABS:                        5.7    35.63 )-----------( 55       ( 06 Jun 2023 17:27 )             17.2

## 2023-06-07 NOTE — PROGRESS NOTE ADULT - ASSESSMENT
1yF w/ PMHx of HTN, HLD, leukemia (remission), thrombocytopenia, myelodysplastic syndrome s/p fall. Injuries are identified: SDH, L sup/inf rami fx, gluteal hematoma, SIADH. Has been waiting for TRAN placement for the past few days. Daughter asked team to draw H/H, due to history of anemia prior to discharge, most likely from myelodysplastic syndrome. No hemodynamic changes prior. Areas of old ecchymosis, tired. H/h: 7.7>5.8>5.7. Given a unit of blood last night. No reactions.     PLAN:    - will follow up AM CBC  - awaiting placement to [facility], stable for discharge from a trauma perspective, conditional d/c in    - ortho: WBAT LLE   - PMR: TRAN   - F/u Neurosurgery as outpatient  - heme/onc: should perform weekly CBC if discharged rehab,  keep Hb >7, plt >50 c/ transfusions   - monitor vitals   - Lov dvt ppx  - continue home meds   - diet: fluid restriction 1000 mL, regular   - seroquel for agitation

## 2023-06-07 NOTE — PROGRESS NOTE ADULT - ATTENDING COMMENTS
Patient seen and examined on AM rounds  No new complaints  vitals stable  Pain seems reasonably controlled  Grossly neurologically intact  GCS-15  POTTER     - Hemoglobin 5.8 yesterday, given one unit PRBCs, repeat hemoglobin is 7.0 today.  Likely this represents patient's previous MDS (which required transfusion support previously as outpatient).  Will give another unit today & monitor with CBC every 3-4 days moving forward.  - Case management working for sub acute rehab placement.  - I have discussed the above issues with patient's daughter, Izzy ([phone]), today.   Daughter agrees with transfusion support & placement at rehab ASAP.

## 2023-06-07 NOTE — PROVIDER CONTACT NOTE (CRITICAL VALUE NOTIFICATION) - ACTION/TREATMENT ORDERED:
"Anesthesia Pre-Procedure Evaluation    Patient: Diane Pichardo   MRN:     [id number] Gender:   female   Age:    72 year old :      1948        Preoperative Diagnosis: Combined forms of age-related cataract of left eye [H25.812]   Procedure(s):  LEFT PHACOEMULSIFICATION, CATARACT, WITH INTRAOCULAR LENS IMPLANT      LABS:  CBC: No results found for: WBC, HGB, HCT, PLT  BMP: No results found for: NA, POTASSIUM, CHLORIDE, CO2, BUN, CR, GLC  COAGS: No results found for: PTT, INR, FIBR  POC: No results found for: BGM, HCG, HCGS  OTHER: No results found for: PH, LACT, A1C, SALVATORE, PHOS, MAG, ALBUMIN, PROTTOTAL, ALT, AST, GGT, ALKPHOS, BILITOTAL, BILIDIRECT, LIPASE, AMYLASE, SUSSY, TSH, T4, T3, CRP, SED     Preop Vitals    BP Readings from Last 3 Encounters:   20 (!) 149/85   10/03/18 129/75   18 136/68    Pulse Readings from Last 3 Encounters:   20 88   10/03/18 78   18 80      Resp Readings from Last 3 Encounters:   17 16   14 14    SpO2 Readings from Last 3 Encounters:   20 99%   17 97%   14 99%      Temp Readings from Last 1 Encounters:   14 97.9  F (36.6  C) (Oral)    Ht Readings from Last 1 Encounters:   18 1.676 m (5' 6\")      Wt Readings from Last 1 Encounters:   10/03/18 67.6 kg (149 lb)    Estimated body mass index is 24.05 kg/m  as calculated from the following:    Height as of 18: 1.676 m (5' 6\").    Weight as of 10/3/18: 67.6 kg (149 lb).     LDA:        Past Medical History:   Diagnosis Date     Congenital anisocoria      Mass on back      Nonsenile cataract      Post concussion syndrome 2013     Pulmonary embolus (H)       History reviewed. No pertinent surgical history.   No Known Allergies     Anesthesia Evaluation     . Pt has had prior anesthetic. Type: MAC    History of anesthetic complications          ROS/MED HX    ENT/Pulmonary: Comment: Deviated septum.  Still with some breathing difficulties lying flat  "
1 unit of PRBC
will follow up
  (+)Intermittent asthma Treatment: Inhaler prn,  , . Other pulmonary disease Pulmonary embolism in past.  Work-up negative.  Not anticoagulated.    Neurologic: Comment: Restless leg syndrome      Cardiovascular:  - neg cardiovascular ROS       METS/Exercise Tolerance:     Hematologic:  - neg hematologic  ROS       Musculoskeletal:  - neg musculoskeletal ROS       GI/Hepatic:  - neg GI/hepatic ROS       Renal/Genitourinary:  - ROS Renal section negative       Endo:  - neg endo ROS       Psychiatric:  - neg psychiatric ROS       Infectious Disease:  - neg infectious disease ROS       Malignancy:      - no malignancy   Other:    - neg other ROS                     PHYSICAL EXAM:   Mental Status/Neuro: A/A/O   Airway: Facies: Feasible  Mallampati: I  Mouth/Opening: Full  TM distance: > 6 cm  Neck ROM: Full   Respiratory: Auscultation: CTAB     Resp. Rate: Normal     Resp. Effort: Normal      CV: Rhythm: Regular  Rate: Age appropriate  Heart: Normal Sounds  Edema: None   Comments:      Dental: Normal Dentition                Assessment:   ASA SCORE: 2    H&P: History and physical reviewed and following examination; no interval change.   Smoking Status:  Non-Smoker/Unknown   NPO Status: NPO Appropriate     Plan:   Anes. Type:  MAC   Pre-Medication: None   Induction:  N/a   Airway: Native Airway   Access/Monitoring: PIV   Maintenance: N/a     Postop Plan:   Postop Pain: None  Postop Sedation/Airway: Not planned     PONV Management:   Adult Risk Factors: Female, Non-Smoker   Prevention: Ondansetron     CONSENT: Direct conversation   Plan and risks discussed with: Patient   Blood Products: Consent Deferred (Minimal Blood Loss)                   aBy Ruvalcaba MD  
patients VSS. no active signs of bleeding. Will follow up with orders.
1 unit PRBC
stat repeat cbc

## 2023-06-07 NOTE — DIETITIAN NUTRITION RISK NOTIFICATION - ADDITIONAL COMMENTS/DIETITIAN RECOMMENDATIONS
Continue diet as tolerated; fluid restriction per MD discretion.  Add Ensure Enlive BID (350 kcal, 20g protein per serving).  Rx: MVI daily.  Encourage po intake, monitor diet tolerance, and provide assistance at meals as needed.  Obtain daily weights to monitor trends.

## 2023-06-07 NOTE — PROVIDER CONTACT NOTE (CRITICAL VALUE NOTIFICATION) - SITUATION
Notified by lab with critical result.
pt s/p fall w/ SDH and gris rich
pt H&H is 7.0 and 21.0
s/p fall. multiple areas of ecchymosis on body. patient weak and tired

## 2023-06-08 VITALS
TEMPERATURE: 97 F | RESPIRATION RATE: 18 BRPM | DIASTOLIC BLOOD PRESSURE: 65 MMHG | OXYGEN SATURATION: 98 % | SYSTOLIC BLOOD PRESSURE: 154 MMHG | HEART RATE: 70 BPM

## 2023-06-08 PROCEDURE — 86922 COMPATIBILITY TEST ANTIGLOB: CPT

## 2023-06-08 PROCEDURE — 80048 BASIC METABOLIC PNL TOTAL CA: CPT

## 2023-06-08 PROCEDURE — 74177 CT ABD & PELVIS W/CONTRAST: CPT

## 2023-06-08 PROCEDURE — 36430 TRANSFUSION BLD/BLD COMPNT: CPT

## 2023-06-08 PROCEDURE — 82436 ASSAY OF URINE CHLORIDE: CPT

## 2023-06-08 PROCEDURE — 83935 ASSAY OF URINE OSMOLALITY: CPT

## 2023-06-08 PROCEDURE — 85025 COMPLETE CBC W/AUTO DIFF WBC: CPT

## 2023-06-08 PROCEDURE — 86901 BLOOD TYPING SEROLOGIC RH(D): CPT

## 2023-06-08 PROCEDURE — 85027 COMPLETE CBC AUTOMATED: CPT

## 2023-06-08 PROCEDURE — P9040: CPT

## 2023-06-08 PROCEDURE — 99231 SBSQ HOSP IP/OBS SF/LOW 25: CPT

## 2023-06-08 PROCEDURE — 82570 ASSAY OF URINE CREATININE: CPT

## 2023-06-08 PROCEDURE — 83735 ASSAY OF MAGNESIUM: CPT

## 2023-06-08 PROCEDURE — 86902 BLOOD TYPE ANTIGEN DONOR EA: CPT

## 2023-06-08 PROCEDURE — 86900 BLOOD TYPING SEROLOGIC ABO: CPT

## 2023-06-08 PROCEDURE — 84100 ASSAY OF PHOSPHORUS: CPT

## 2023-06-08 PROCEDURE — P9016: CPT

## 2023-06-08 PROCEDURE — 85014 HEMATOCRIT: CPT

## 2023-06-08 PROCEDURE — 93005 ELECTROCARDIOGRAM TRACING: CPT

## 2023-06-08 PROCEDURE — P9100: CPT

## 2023-06-08 PROCEDURE — 85018 HEMOGLOBIN: CPT

## 2023-06-08 PROCEDURE — 87641 MR-STAPH DNA AMP PROBE: CPT

## 2023-06-08 PROCEDURE — 72125 CT NECK SPINE W/O DYE: CPT

## 2023-06-08 PROCEDURE — 87635 SARS-COV-2 COVID-19 AMP PRB: CPT

## 2023-06-08 PROCEDURE — 73620 X-RAY EXAM OF FOOT: CPT

## 2023-06-08 PROCEDURE — 36415 COLL VENOUS BLD VENIPUNCTURE: CPT

## 2023-06-08 PROCEDURE — 84300 ASSAY OF URINE SODIUM: CPT

## 2023-06-08 PROCEDURE — 97530 THERAPEUTIC ACTIVITIES: CPT

## 2023-06-08 PROCEDURE — 83930 ASSAY OF BLOOD OSMOLALITY: CPT

## 2023-06-08 PROCEDURE — P9037: CPT

## 2023-06-08 PROCEDURE — 86850 RBC ANTIBODY SCREEN: CPT

## 2023-06-08 PROCEDURE — 97535 SELF CARE MNGMENT TRAINING: CPT

## 2023-06-08 PROCEDURE — 71260 CT THORAX DX C+: CPT

## 2023-06-08 PROCEDURE — 73718 MRI LOWER EXTREMITY W/O DYE: CPT

## 2023-06-08 PROCEDURE — 87640 STAPH A DNA AMP PROBE: CPT

## 2023-06-08 PROCEDURE — 70450 CT HEAD/BRAIN W/O DYE: CPT

## 2023-06-08 RX ORDER — AMITRIPTYLINE HCL 25 MG
1 TABLET ORAL
Qty: 0 | Refills: 0 | DISCHARGE

## 2023-06-08 RX ORDER — AMITRIPTYLINE HCL 25 MG
25 TABLET ORAL EVERY 12 HOURS
Refills: 0 | Status: DISCONTINUED | OUTPATIENT
Start: 2023-06-08 | End: 2023-06-08

## 2023-06-08 RX ORDER — AMITRIPTYLINE HCL 25 MG
1 TABLET ORAL
Qty: 0 | Refills: 0 | DISCHARGE
Start: 2023-06-08

## 2023-06-08 RX ADMIN — Medication 12.5 MILLIGRAM(S): at 05:53

## 2023-06-08 RX ADMIN — SODIUM CHLORIDE 1 GRAM(S): 9 INJECTION INTRAMUSCULAR; INTRAVENOUS; SUBCUTANEOUS at 05:53

## 2023-06-08 NOTE — PROGRESS NOTE ADULT - NS ATTEND OPT1 GEN_ALL_CORE
I independently performed the documented:
I attest my time as attending is greater than 50% of the total combined time spent on qualifying patient care activities by the PA/NP and attending.
I attest my time as attending is greater than 50% of the total combined time spent on qualifying patient care activities by the PA/NP and attending.
I independently performed the documented:
I attest my time as attending is greater than 50% of the total combined time spent on qualifying patient care activities by the PA/NP and attending.

## 2023-06-08 NOTE — PROGRESS NOTE ADULT - ASSESSMENT
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 CT at [place] showed a Left Superior and inferior pubic rami fracture. SDH intrahemishere region on CT head. Repeat CTH stable.    1) MDS  recent flow cytometry on blood showed 13% blasts  ? transformation to leukemia  given age and comorbidities not a candidate for aggressive therapy  c/w supportive transfusions  keep hgb > 7, platelet count > 50k  Patient should keep appointment with Hematology upon discharge (would recommend follow-up clinic appointment with Hematology within 1 week upon discharge)  If patient is discharged to rehab, should perform weekly CBC  Hb 9.0    2) L hip fracture  ortho following  conservative measures  plan for rehab    3) SDH  repeat CTH stable  neurosx following  goal plt > 70-80k, given BM involved this may not be achieved with transfusions   last plts =45    4) DVT ppx  lovenox as long as plt count > 30,000    further mx as per primary team

## 2023-06-08 NOTE — PROGRESS NOTE ADULT - PROVIDER SPECIALTY LIST ADULT
Brain Injury Medicine
Brain Injury Medicine
Surgery
Trauma Surgery
Heme/Onc
Heme/Onc
Neurosurgery
SICU
Surgery
Surgery
Trauma Surgery
Heme/Onc
Orthopedics
SICU
Surgery
Trauma Surgery
Heme/Onc
SICU
Trauma Surgery
Palliative Care

## 2023-06-08 NOTE — PROGRESS NOTE ADULT - NS ATTEND AMEND GEN_ALL_CORE FT
Acute blood loss anemia with complex medical conditions with pelvic fracture with minimal pain.  Continue PT and WBAT and ortho to follow daily.  please call with any issues.
Agree with above assessment.  The patient was seen and examined by myself with the surgical PA.  The patient is without new events or complaints overnight.  The patient remains on a 1:1 at present, abdomen is soft and non distended.  The patient is trauma stable for discharge planning.
I have seen and examined the patient during rounds form [id number] hrs  events noted    no new issues  PTY  DVT chemoprophylaxes   Awaiting placement
I have seen and examined the patient during rounds form [id number] hrs  events noted    no new issues  Na stable on tabs and fluid restrictions  PT  DVT chemoprophylaxes   Dispo planning
Agree with PA note and plan as written - continue with medical management and SICU care.
Agree with above assessment.  The patient was seen and examined by myself with the surgical PA.  The patient is without new complaints or events overnight.  The patient was found to be anemic and is due to receive a unit of PRBC's. Trauma stable.
Agree with above assessment.  The patient was seen and examined by myself with the surgical PA.  The patient is without new events or complaints overnight.  Trauma stable for discharge planning.
I have seen and examined this patient with the team.  Agree with above.  No acute events overnight.  Patient appears well this morning.  Hgb stabilized.  Patient safe for DC to [facility].
agree with above    see my addendum to initial consult

## 2023-06-08 NOTE — PROGRESS NOTE ADULT - NUTRITIONAL ASSESSMENT
This patient has been assessed with a concern for Malnutrition and has been determined to have a diagnosis/diagnoses of Severe protein-calorie malnutrition.    This patient is being managed with:   Diet Regular-  1000mL Fluid Restriction ([id number])  Entered: May 29 2023  7:41PM  
This patient has been assessed with a concern for Malnutrition and has been determined to have a diagnosis/diagnoses of Severe protein-calorie malnutrition.    This patient is being managed with:   Diet Regular-  1000mL Fluid Restriction ([id number])  Entered: May 29 2023  7:41PM

## 2023-06-08 NOTE — PROGRESS NOTE ADULT - SUBJECTIVE AND OBJECTIVE BOX
SUBJECTIVE/24 hour events:    Patient is a 89yFemale s/p fall sustaining a sdh, left inferior/superior rami fx, gluteal hematoma and SIADH ( resolving) Patient with no acute events overnight, VSS. Patient had been transfused for hemoglobin of 5.7, transfused 1 unit PRBC and responded appropriately to 9.0.Patient  neurologically  stable and neurosurgery signed off. Heme-onc following, appreciate recommendations, needs to f/u with her outpatient hematologist within 1 week of discharge. Patient denied acute rehab, awaiting [facility] placement        Vital Signs Last 24 Hrs  T(C): 36.9 (07 Jun 2023 23:00), Max: 37.3 (07 Jun 2023 02:00)  T(F): 98.4 (07 Jun 2023 23:00), Max: 99.1 (07 Jun 2023 02:00)  HR: 74 (07 Jun 2023 23:00) (66 - 83)  BP: 120/63 (07 Jun 2023 23:00) (116/73 - 154/70)  BP(mean): --  RR: 18 (07 Jun 2023 23:00) (16 - 18)  SpO2: 96% (07 Jun 2023 23:00) (92% - 98%)    Parameters below as of 07 Jun 2023 23:00  Patient On (Oxygen Delivery Method): room air      Drug Dosing Weight  Height (cm): 154.9 (23 May 2023 16:36)  Weight (kg): 55.8 (23 May 2023 16:36)  BMI (kg/m2): 23.3 (23 May 2023 16:36)  BSA (m2): 1.54 (23 May 2023 16:36)  I&O's Detail    07 Jun 2023 07:01  -  08 Jun 2023 00:36  --------------------------------------------------------  IN:  Total IN: 0 mL    OUT:    Voided (mL): 800 mL  Total OUT: 800 mL    Total NET: -800 mL        Allergies    Proplene Glycol (Urticaria)  steroids - numbness to face (Unknown)  COBALT (Urticaria)  Balsam of [place] (Urticaria)    Intolerances    Levaquin (Other)                            9.0    x     )-----------( x        ( 07 Jun 2023 16:35 )             26.9   06-07    129<L>  |  98  |  13.6  ----------------------------<  105<H>  3.7   |  23.0  |  0.57    Ca    9.3      07 Jun 2023 07:10  Phos  3.4     06-07  Mg     2.0     06-07        ROS:      Physical Exam:  General: in good spirits, talking with daughter on the phone, watching tv  Pulmonary: Nonlabored breathing, no respiratory distress  Cardiovascular: NSR  Abdominal: soft,  non tender and nondistended, no rigidity or peritonitic signs   Extremities: WWP, FROM, sensation intact           MEDICATIONS  (STANDING):  atorvastatin 40 milliGRAM(s) Oral at bedtime  enoxaparin Injectable 40 milliGRAM(s) SubCutaneous every 24 hours  lidocaine   4% Patch 1 Patch Transdermal daily  melatonin 5 milliGRAM(s) Oral at bedtime  metoprolol tartrate 12.5 milliGRAM(s) Oral every 12 hours  OLANZapine 2.5 milliGRAM(s) Oral once  QUEtiapine 12.5 milliGRAM(s) Oral at bedtime  sodium chloride 1 Gram(s) Oral every 8 hours    MEDICATIONS  (PRN):  acetaminophen     Tablet .. 975 milliGRAM(s) Oral every 6 hours PRN Temp greater or equal to 38C (100.4F), Mild Pain (1 - 3)      RADIOLOGY STUDIES:    CULTURES:

## 2023-06-08 NOTE — PROGRESS NOTE ADULT - SUBJECTIVE AND OBJECTIVE BOX
No acute ON events  DC planning to rehab    Gen - in NAD  Heart - S1S2 RRR  Lung - Dec BS B/L  Abd - soft ND NT  eXT - NO EDEMA    MEDICATIONS  (STANDING):  amitriptyline 25 milliGRAM(s) Oral every 12 hours  atorvastatin 40 milliGRAM(s) Oral at bedtime  enoxaparin Injectable 40 milliGRAM(s) SubCutaneous every 24 hours  lidocaine   4% Patch 1 Patch Transdermal daily  melatonin 5 milliGRAM(s) Oral at bedtime  metoprolol tartrate 12.5 milliGRAM(s) Oral every 12 hours  OLANZapine 2.5 milliGRAM(s) Oral once  QUEtiapine 12.5 milliGRAM(s) Oral at bedtime  sodium chloride 1 Gram(s) Oral every 8 hours    MEDICATIONS  (PRN):  acetaminophen     Tablet .. 975 milliGRAM(s) Oral every 6 hours PRN Temp greater or equal to 38C (100.4F), Mild Pain (1 - 3)      Vital Signs Last 24 Hrs  T(C): 36.3 (08 Jun 2023 08:45), Max: 37.3 (07 Jun 2023 15:58)  T(F): 97.4 (08 Jun 2023 08:45), Max: 99.1 (07 Jun 2023 15:58)  HR: 70 (08 Jun 2023 08:45) (70 - 83)  BP: 154/65 (08 Jun 2023 08:45) (120/63 - 154/65)  BP(mean): --  RR: 18 (08 Jun 2023 08:45) (16 - 18)  SpO2: 98% (08 Jun 2023 08:45) (95% - 98%)    Parameters below as of 08 Jun 2023 08:45  Patient On (Oxygen Delivery Method): room air                                9.0    x     )-----------( x        ( 07 Jun 2023 16:35 )             26.9

## 2023-06-08 NOTE — PROGRESS NOTE ADULT - REASON FOR ADMISSION
SDH, Pelvic Fracture
Trauamtic SDH, Pelvic Fracture
SDH, Pelvic Fracture

## 2023-06-10 NOTE — PROGRESS NOTE ADULT - ASSESSMENT
89yF w/ PMHx of HTN, HLD, leukemia (remission), thrombocytopenia s/p fall. Injuries are identified: SDH, L sup/inf rami fx, gluteal hematoma, SIADH.     PLAN:    - awaiting placement to [facility], stable for discharge from a trauma perspective, conditional d/c in    - ortho: WBAT LLE   - PMR: [facility]   - F/u Neurosurgery as outpatient  - keep Hb >7, plt >50 per heme onc  - monitor vitals   - Lov dvt ppx  - continue home meds   - diet: fluid restriction 1000 mL, regular   - seroquel for agitation    1 = Total assistance

## 2023-06-19 NOTE — ED PROVIDER NOTE - CARDIAC HEART SOUNDS
S1-S2/MURMUR Additional Notes: See interval skin history Detail Level: Zone Render Risk Assessment In Note?: no

## 2023-06-27 NOTE — CONSULT NOTE ADULT - TIME-BASED BILLING (NON-CRITICAL CARE)
Quality 226: Preventive Care And Screening: Tobacco Use: Screening And Cessation Intervention: Patient screened for tobacco use and is an ex/non-smoker Quality 130: Documentation Of Current Medications In The Medical Record: Current Medications Documented Detail Level: Detailed Time-based billing (NON-critical care)

## 2023-08-09 NOTE — ED ADULT NURSE REASSESSMENT NOTE - NS ED NURSE REASSESS COMMENT FT1
Patients first unit of blood was stopped at 2021 patient denies any pain, chills or fever, vitals are WNL. Patient is currently aaox4, lying down on the stretcher in [facility], watching TV, pending 2nd unit of blood. throat/complains of pain/discomfort

## 2023-11-14 NOTE — PROGRESS NOTE ADULT - ASSESSMENT
Wound consult received as part of wound care order set. Current wound noted does not significantly impact nutrition needs. No nutrition intervention appears indicated at this time. Nutrition services to sign off, RD available via consult per policy.   A: Patient is a 89 yo F s/p fall with SDH, L sup/inf rami fx, gluteal hematoma, s/p 2plt and 2u prbc transfusions, hemodynamically stable, afebrile.     Plan:   left heel pain f/u MRI   Palliative following- not ready for hospice   Hyponatremia- salt tabs TID, fluid restriction  DVT ppx   Hem/onc following   Regular diet   Ortho- WBAT   Pain control   F/u Neurosurgery as outpatient  Monitor delirium- maintain sleep/wake cycle   DC planning to acute rehab - off 1:1

## 2024-02-23 NOTE — ED ADULT NURSE NOTE - NS ED NURSE DISCH DISPOSITION
Symptom management with Zyrtec, Flonase, and Mucinex D.  Use over-the-counter Tylenol and Motrin for pain or fever.  Push oral fluids.  Isolate until symptoms improve along with resolution of fever for 24 hours without medications.  Complete good hand hygiene.  Follow-up with PCP in 3 to 5 days with new worsening symptoms.   
Admitted

## 2024-04-15 NOTE — PROGRESS NOTE ADULT - SUBJECTIVE AND OBJECTIVE BOX
SUBJECTIVE/24 hour events:  Patient is a 89yFemale (...)      Vital Signs Last 24 Hrs  T(C): 37.2 (30 May 2023 19:56), Max: 37.2 (30 May 2023 19:56)  T(F): 99 (30 May 2023 19:56), Max: 99 (30 May 2023 19:56)  HR: 81 (30 May 2023 19:56) (70 - 81)  BP: 127/66 (30 May 2023 19:56) (127/66 - 135/61)  BP(mean): --  RR: 18 (30 May 2023 19:56) (18 - 18)  SpO2: 98% (30 May 2023 19:56) (93% - 98%)    Parameters below as of 30 May 2023 19:56  Patient On (Oxygen Delivery Method): room air      Drug Dosing Weight  Height (cm): 154.9 (23 May 2023 16:36)  Weight (kg): 55.8 (23 May 2023 16:36)  BMI (kg/m2): 23.3 (23 May 2023 16:36)  BSA (m2): 1.54 (23 May 2023 16:36)  I&O's Detail    29 May 2023 07:01  -  30 May 2023 07:00  --------------------------------------------------------  IN:  Total IN: 0 mL    OUT:    Voided (mL): 1050 mL  Total OUT: 1050 mL    Total NET: -1050 mL      30 May 2023 07:01  -  31 May 2023 00:26  --------------------------------------------------------  IN:  Total IN: 0 mL    OUT:    Voided (mL): 700 mL  Total OUT: 700 mL    Total NET: -700 mL        Allergies    Proplene Glycol (Urticaria)  steroids - numbness to face (Unknown)  COBALT (Urticaria)  Balsam of [place] (Urticaria)    Intolerances    Levaquin (Other)                            7.8    24.76 )-----------( 32       ( 30 May 2023 04:30 )             23.8   05-30    131<L>  |  100  |  16.1  ----------------------------<  89  4.1   |  23.0  |  0.69    Ca    9.4      30 May 2023 04:30  Phos  3.7     05-30  Mg     2.0     05-30        ROS:    PHYSICAL EXAM:  Constitutional:  Eyes:  ENMT:  Neck:  Breasts:  Back:  Respiratory:  Cardiovascular:  Gastrointestinal:  Genitourinary:  Rectal:  Extremities:  Vascular:  Neurological:  Skin:  Musculoskeletal:  Psychiatric:        MEDICATIONS  (STANDING):  amitriptyline 25 milliGRAM(s) Oral every 12 hours  atorvastatin 40 milliGRAM(s) Oral at bedtime  enoxaparin Injectable 40 milliGRAM(s) SubCutaneous every 24 hours  lidocaine   4% Patch 1 Patch Transdermal daily  melatonin 5 milliGRAM(s) Oral at bedtime  metoprolol tartrate 12.5 milliGRAM(s) Oral every 12 hours  QUEtiapine 12.5 milliGRAM(s) Oral at bedtime  sodium chloride 1 Gram(s) Oral every 8 hours    MEDICATIONS  (PRN):  acetaminophen     Tablet .. 975 milliGRAM(s) Oral every 6 hours PRN Temp greater or equal to 38C (100.4F), Mild Pain (1 - 3)      RADIOLOGY STUDIES:    CULTURES:         Hx Right kidney calculus - s/p lithotripsy and cystoscopy Calculus of kidney - Left

## 2024-11-13 NOTE — ED PROVIDER NOTE - CARE PROVIDER_API CALL
Igor Moseley)  Internal Medicine  [address] [place] [address]  Phone: [phone]  Fax: [phone]  Follow Up Time:     Pacheco Lira)  Hematology; Internal Medicine; Medical Oncology  [address] [facility], [address]  [place] [address]  Phone: [phone]  Fax: [phone]  Follow Up Time:    IV discontinued, cath removed intact

## 2025-02-10 NOTE — ED ADULT NURSE NOTE - SUICIDE SCREENING DEPRESSION
----- Message from Fiorella Anderson MD sent at 2/10/2025 12:56 PM CST -----  Normal results, please call family to let them know, thanks   Negative

## 2025-04-14 NOTE — ED ADULT NURSE NOTE - FINAL NURSING ELECTRONIC SIGNATURE
Refill request for xarelto filled. Patient has upcoming visit with Zoila Salcedo NP on 4/16/25 with labs.   
18-Dec-2020 23:08

## 2025-06-17 NOTE — ED PROVIDER NOTE - NIH STROKE SCALE: 1A. LEVEL OF CONSCIOUSNESS, QM
[place] INPATIENT  NEUROLOGY CONSULT NOTE    REQUESTING PROVIDER:  Rancho Hernadez DO     CHIEF COMPLAINT:    Chief Complaint   Patient presents with    Altered Mental Status       HISTORY OF PRESENT ILLNESS:  Es Berg is a 61 year old right handed female who was admitted on 6/16/2025 for confusion. The pt reports she doesn't remember what happened yesterday. She knows she is in the hospital, but is not able to name which one. At home, she lives with her . We tried to contact him over the telephone for additional history, but he was not available at the time of this interview.     The pt complains of a headache located in the back of her head currently. She describes it as a \"squeezing\" sensation on the top of her head. She is having nausea and tingling in her extremities. No vision changes. She has history of migraine headaches - she is on gabapentin and propanolol. She reports having migraines about twice per week. She denies history of confusion associated with her migraines.     She denies recent medication changes or recent illness. She denies personal history of stroke or seizure. No personal history of traumatic brain injury, brain surgery, or meningitis. Her father also suffered from migraine headaches, but he also had brain cancer. The pt is currently employed with the [employer].    Per Chart Review, pt has seen Neurology in the past at [facility] in 2022. Per documentation, she presented with multiple neurologic complaints including fibromyalgia, neuropathic pain, cognitive dysfunction, bowel/bladder dysfunction, migraines. She was started on Amitriptyline.     Per Chart Review, the pt has seen Neurology Dr. Jovany Newby MD in 2020. During his assessment, he thought she may have carpal tunnel syndrome and restless legs syndrome (RLS). EMG completed in 2020 showed mild right carpal tunnel syndrome.     HISTORIES:  Past Medical History:   Diagnosis Date    Anemia     Bronchitis     Fracture 1990's    right  hand    Inflammatory bowel disease     Pneumonia     RAD (reactive airway disease) (CMD)     Sinusitis, chronic     Thyroid condition 2002    graves     Patient Active Problem List   Diagnosis    Moderate persistent asthma (CMD)    Acute exacerbation of extrinsic asthma (CMD)    Dyspnea and respiratory abnormalities    Lymphocytic colitis    Graves disease    Endometriosis    Migraine with aura    Globus sensation    Altered mental status, unspecified altered mental status type    Hyperthyroidism      Past Surgical History:   Procedure Laterality Date    Abdomen surgery      Appendectomy      Breast reduction surgery      Eye surgery      Hysterectomy  01/01/2010    Laparoscopic exploration abdomen      Oophorectomy Bilateral     Removal gallbladder  1994    Shoulder surgery Left     Tonsillectomy       ALLERGIES:   Allergen Reactions    Prednisone THROAT SWELLING    Bentyl VISUAL DISTURBANCE    Dicyclomine Other (See Comments)     Blurred vision    Aspirin Nausea & Vomiting, GI UPSET and VOMITING    Cephalexin RASH    Codeine HIVES    Hydrocodone-Acetaminophen HIVES    Ibuprofen DIARRHEA    Imitrex [Sumatriptan] GI UPSET    Meperidine Nausea & Vomiting    Migraine Formula ANXIETY, CARDIAC DISTURBANCES, DIZZINESS, HEADACHES, RASH, Tinnitus and VISUAL DISTURBANCE    Morphine HIVES    Sulfa Antibiotics HIVES    Sulfasalazine HIVES and RASH     Current Facility-Administered Medications   Medication    sodium chloride 0.9 % injection 10 mL    sodium chloride 0.9 % injection 2 mL    [Held by provider] gabapentin (NEURONTIN) capsule 300 mg    hydrOXYzine (ATARAX) tablet 10 mg    propRANolol (INDERAL LA) 24 hr capsule 60 mg    sodium chloride 0.9 % injection 10 mL    sodium chloride 0.9 % injection 2 mL    sodium chloride (NORMAL SALINE) 0.9 % bolus 500 mL    enoxaparin (LOVENOX) injection 40 mg    ondansetron (ZOFRAN ODT) disintegrating tablet 4 mg    Or    ondansetron (ZOFRAN) injection 4 mg    metoCLOPramide (REGLAN)  (0) Alert; keenly responsive tablet 10 mg    Or    metoCLOPramide (REGLAN) injection 10 mg    polyethylene glycol (MIRALAX) packet 17 g    melatonin tablet 3 mg    hydrALAZINE (APRESOLINE) injection 10 mg     Family History   Problem Relation Age of Onset    Angioedema Mother     Atopy Father         medication allergies    Angioedema Father     Allergic Rhinitis Brother     Asthma Brother     Allergic Rhinitis Brother     Asthma Brother     Allergic Rhinitis Brother     Asthma Brother      Social History     Tobacco Use    Smoking status: Never    Smokeless tobacco: Never   Vaping Use    Vaping status: never used   Substance Use Topics    Alcohol use: Not Currently     Alcohol/week: 0.0 standard drinks of alcohol    Drug use: Never       REVIEW OF SYSTEMS:  Neurology: See History of Present Illness.              General: Pos. Neg. HEENT: Pos. Neg.        Fever  []  [x]       Vision Changes  []  [x]        Chills  []  [x]      Upper respiratory symptoms  []  [x]        Weight Changes  []  [x]      Cardiovascular:   Respiratory:          Chest pain  []  [x]       Short of Breath  []  [x]        Palpitations  []  [x]       Cough  []  [x]   GI:    Genitorurinary:          Constipation  []  [x]        Incontinence  []  [x]        Diarrhea  []  [x]      Skin:     Hematology:           Rash  []  [x]       Easy bleeding  []  [x]         Lesions  []  [x]      Easy bruising  []  [x]   Psychiatric:   Musculoskeletal:          Anxiety  []  [x]      Joint pain  []  [x]        Depression  []  [x]          PHYSICAL EXAM:  Visit Vitals  /70 (BP Location: RUE - Right upper extremity, Patient Position: Semi-Steen's)   Pulse 71   Temp 98 °F (36.7 °C) (Temporal)   Resp 15   Ht 5' 3\" (1.6 m)   Wt 92.5 kg (203 lb 14.8 oz)   SpO2 96%   BMI 36.12 kg/m²     GENERAL APPEARANCE: Patient is pleasant, cooperative in no apparent distress.  HEART: Regular rhythm with no murmurs.  LUNGS: Lung sounds are clear. No shortness of breath.  ABDOMEN: Soft, nontender.  NECK:  Carotids no bruits.  No meningismus.  EXTREMITIES: Extremities are warm and well perfused. No pitting edema noted.    NEUROLOGIC:  Mentation: Alert, orientated, and cooperative. Not able to provide a history about the day of admission. Not able to elaborate on her history of migraines. Able to answer questions about her medical history; she remembers her diagnosis of thyroid dysfunction, for example. Speech fluent. Able to order breakfast with nutrition.     Cranial nerves:  CN II: Visual acuity grossly intact. Visual fields full to confrontational testing.   CN III, IV, VI: Pupils are equal, reactive to light and accommodation. Extraocular movements full, without nystagmus or gaze paresis. There is no ptosis.  CN V: Facial sensation intact to light touch.  CN VII: Facial movement full and symmetric.  CN VIII: Hearing intact to a speaking voice.  CN IX, X: Palate elevates in the midline, swallow and phonation are normal. No dysarthria noted.  CN XI: Sternocleidomastoid are symmetrical with normal movement.  CN XII: Tongue is in the midline without atrophy or fasciculations.    Motor: No abnormal movements noticed. Muscle mass and tone are intact over all extremities. Pt exhibits generalized weakness with limited effort on motor strength testing. Appears to have symmetric strength in all four extremities; is able to lift all extremities off the bed to command.     Sensory: Sensory exam is patchy - reports reduced light touch sensation on right leg, reduced proprioception on left great toe. Reduced vibration on left great toe.     Reflexes:  1-2+ throughout.  Babinski sign: toes are mute bilateral.  Vaughan sign: is negative bilateral.    Cerebellar: Finger-to-nose with dysmetria on the left.     Gait: Deferred.    ADDITIONAL DATA:  Results for orders placed or performed during the hospital encounter of 06/16/25 (from the past 72 hours)   CT HEAD LEVEL 1    Impression    IMPRESSION:  No acute intracranial  findings.    Findings were called immediately to  TATYANA FUENTES PA-C on 6/16/2025 7:17  PM.    //Location Code: [facility]      Electronically Signed by: Sapphire Kyle MD  Signed on: 6/16/2025 7:20 PM  Created on Workstation ID: [id number]  Signed on Workstation ID: [id number]   CTA HEAD AND NECK LEVEL 1    Addendum: 6/16/2025    Addendum:    Additional post-processed 3D images have been added to this study, but the  report remains unchanged.         Electronically Signed by: Dioni Hagen MD  Signed on: 6/16/2025 8:00 PM  Created on Workstation ID: [id number]  Signed on Workstation ID: [id number]        Impression    IMPRESSION:  1.  No intracranial large vessel occlusion or significant stenosis.  2.  No occlusion or hemodynamically significant arterial stenosis in the  neck.        Electronically Signed by: Dioni Hagen MD  Signed on: 6/16/2025 7:31 PM  Created on Workstation ID: [id number]  Signed on Workstation ID: [id number]   XR CHEST AP OR PA    Impression    IMPRESSION:    No acute abnormality.      Electronically Signed by: Sapphire Kyle MD  Signed on: 6/16/2025 8:29 PM  Created on Workstation ID: [id number]  Signed on Workstation ID: [id number]   MRI BRAIN WO CONTRAST    Impression    IMPRESSION:   Normal brain MRI. No evidence of acute infarct.                  Electronically Signed by: Dioni Hagen MD  Signed on: 6/16/2025 9:28 PM  Created on Workstation ID: [id number]  Signed on Workstation ID: [id number]      ASSESSMENT:  This is a 61 year old female with history of migraine headaches admitted on 6/16/2025 for acute onset confusion associated with a headache. MRI brain this admission negative for acute ischemia. Differential includes complex migraine, transient global amnesia, TIA, seizure, other.     PLAN:  -Routine EEG  -Migraine cocktail: IV magnesium, IV reglan with benadryl, IV fluid bolus, IV VPA  -, pt would likely benefit from statin  -Aspirin and NSAID allergy noted  -A1c 5.6, at  goal  -Echo  -Telemetry while inpatient  -Labs: ESR, CRP  -Pt may follow up with myself as outpatient at next available    The patient and/or family members indicated understanding of the diagnosis and agreed with the plan of care.    Total time spent with patient 60 minutes, including chart and image review, patient interaction, care coordination, and charting. Greater than 50% of the visit was spent counseling the patient regarding the above issues.    Regina Cordoba, DO  Neurology       NOTE: This document is intended to communicate my findings to other healthcare professionals.  Initial or progress notes use the medical lexicon that may be misunderstood by non-medical persons. Therefore, interpretations of medical terminology should be approached with caution.

## 2025-07-01 NOTE — ED PROVIDER NOTE - NSRISKOFTRANSFER_ED_A_ED
Deterioration of Condition En Route/Death or Disability/Increased Pain/Transportation Risk (There is always a risk of traffic delays resulting in deterioration of condition.)
No